# Patient Record
Sex: FEMALE | Race: WHITE | NOT HISPANIC OR LATINO | Employment: OTHER | URBAN - METROPOLITAN AREA
[De-identification: names, ages, dates, MRNs, and addresses within clinical notes are randomized per-mention and may not be internally consistent; named-entity substitution may affect disease eponyms.]

---

## 2017-04-12 PROCEDURE — 88305 TISSUE EXAM BY PATHOLOGIST: CPT | Performed by: OTOLARYNGOLOGY

## 2017-04-13 ENCOUNTER — LAB REQUISITION (OUTPATIENT)
Dept: LAB | Facility: HOSPITAL | Age: 82
End: 2017-04-13
Payer: MEDICARE

## 2017-04-13 DIAGNOSIS — C44.319 BASAL CELL CARCINOMA OF SKIN OF OTHER PARTS OF FACE: ICD-10-CM

## 2017-04-19 PROCEDURE — 88305 TISSUE EXAM BY PATHOLOGIST: CPT | Performed by: OTOLARYNGOLOGY

## 2017-04-22 ENCOUNTER — LAB REQUISITION (OUTPATIENT)
Dept: LAB | Facility: HOSPITAL | Age: 82
End: 2017-04-22
Payer: MEDICARE

## 2017-04-22 DIAGNOSIS — C44.319 BASAL CELL CARCINOMA OF SKIN OF OTHER PARTS OF FACE: ICD-10-CM

## 2017-08-29 LAB
LEFT EYE DIABETIC RETINOPATHY: NORMAL
RIGHT EYE DIABETIC RETINOPATHY: NORMAL

## 2018-07-11 DIAGNOSIS — E87.6 HYPOKALEMIA: Primary | ICD-10-CM

## 2018-07-12 RX ORDER — POTASSIUM CHLORIDE 750 MG/1
10 CAPSULE, EXTENDED RELEASE ORAL 2 TIMES DAILY
Qty: 90 CAPSULE | Refills: 2 | Status: SHIPPED | OUTPATIENT
Start: 2018-07-12 | End: 2018-07-24 | Stop reason: SDUPTHER

## 2018-07-24 DIAGNOSIS — I10 ESSENTIAL HYPERTENSION: Primary | ICD-10-CM

## 2018-07-24 RX ORDER — POTASSIUM CHLORIDE 750 MG/1
TABLET, FILM COATED, EXTENDED RELEASE ORAL
Qty: 90 TABLET | Refills: 3 | Status: SHIPPED | OUTPATIENT
Start: 2018-07-24 | End: 2019-09-27 | Stop reason: SDUPTHER

## 2018-08-02 PROBLEM — E11.9 DIABETES MELLITUS (HCC): Status: ACTIVE | Noted: 2018-08-02

## 2018-08-02 PROBLEM — I10 HYPERTENSION: Status: ACTIVE | Noted: 2018-08-02

## 2018-08-02 RX ORDER — PANTOPRAZOLE SODIUM 40 MG/1
40 TABLET, DELAYED RELEASE ORAL DAILY
COMMUNITY
End: 2018-10-08 | Stop reason: SDUPTHER

## 2018-08-02 RX ORDER — INSULIN GLARGINE 100 [IU]/ML
60 INJECTION, SOLUTION SUBCUTANEOUS
COMMUNITY
End: 2018-09-06 | Stop reason: ALTCHOICE

## 2018-08-02 RX ORDER — AMILORIDE HYDROCHLORIDE 5 MG/1
5 TABLET ORAL DAILY
COMMUNITY
End: 2019-03-26 | Stop reason: SDUPTHER

## 2018-08-02 RX ORDER — LEVOTHYROXINE SODIUM 0.1 MG/1
100 TABLET ORAL DAILY
COMMUNITY
End: 2018-10-08 | Stop reason: SDUPTHER

## 2018-08-02 RX ORDER — PRAVASTATIN SODIUM 40 MG
40 TABLET ORAL DAILY
COMMUNITY
End: 2018-08-14 | Stop reason: SDUPTHER

## 2018-08-02 RX ORDER — IRBESARTAN 150 MG/1
150 TABLET ORAL DAILY
COMMUNITY
End: 2018-12-18 | Stop reason: SDUPTHER

## 2018-08-02 RX ORDER — AMLODIPINE BESYLATE 10 MG/1
10 TABLET ORAL
COMMUNITY
End: 2019-03-26 | Stop reason: SDUPTHER

## 2018-08-14 DIAGNOSIS — E78.5 HYPERLIPIDEMIA, UNSPECIFIED HYPERLIPIDEMIA TYPE: Primary | ICD-10-CM

## 2018-08-14 RX ORDER — PRAVASTATIN SODIUM 40 MG
40 TABLET ORAL DAILY
Qty: 90 TABLET | Refills: 3 | Status: SHIPPED | OUTPATIENT
Start: 2018-08-14 | End: 2019-09-19 | Stop reason: SDUPTHER

## 2018-08-31 ENCOUNTER — APPOINTMENT (OUTPATIENT)
Dept: LAB | Facility: HOSPITAL | Age: 83
End: 2018-08-31
Attending: INTERNAL MEDICINE
Payer: MEDICARE

## 2018-08-31 ENCOUNTER — TRANSCRIBE ORDERS (OUTPATIENT)
Dept: ADMINISTRATIVE | Facility: HOSPITAL | Age: 83
End: 2018-08-31

## 2018-08-31 DIAGNOSIS — I10 ESSENTIAL HYPERTENSION: ICD-10-CM

## 2018-08-31 DIAGNOSIS — IMO0001 IDDM (INSULIN DEPENDENT DIABETES MELLITUS): ICD-10-CM

## 2018-08-31 DIAGNOSIS — IMO0001 IDDM (INSULIN DEPENDENT DIABETES MELLITUS): Primary | ICD-10-CM

## 2018-08-31 DIAGNOSIS — M19.90 OSTEOARTHRITIS, UNSPECIFIED OSTEOARTHRITIS TYPE, UNSPECIFIED SITE: ICD-10-CM

## 2018-08-31 LAB
ALBUMIN SERPL BCP-MCNC: 4.3 G/DL (ref 3.5–5.7)
ANION GAP SERPL CALCULATED.3IONS-SCNC: 12 MMOL/L (ref 4–13)
BUN SERPL-MCNC: 17 MG/DL (ref 7–25)
CALCIUM ALBUM COR SERPL-MCNC: 10.2 MG/DL (ref 8.3–10.1)
CALCIUM SERPL-MCNC: 10.4 MG/DL (ref 8.3–10.1)
CALCIUM SERPL-MCNC: 10.4 MG/DL (ref 8.6–10.5)
CHLORIDE SERPL-SCNC: 98 MMOL/L (ref 98–107)
CO2 SERPL-SCNC: 24 MMOL/L (ref 21–31)
CREAT SERPL-MCNC: 0.91 MG/DL (ref 0.6–1.2)
EST. AVERAGE GLUCOSE BLD GHB EST-MCNC: 286 MG/DL
GFR SERPL CREATININE-BSD FRML MDRD: 56 ML/MIN/1.73SQ M
GLUCOSE P FAST SERPL-MCNC: 224 MG/DL (ref 65–99)
HBA1C MFR BLD: 11.6 % (ref 4.2–6.3)
POTASSIUM SERPL-SCNC: 4.8 MMOL/L (ref 3.5–5.5)
SODIUM SERPL-SCNC: 134 MMOL/L (ref 134–143)

## 2018-08-31 PROCEDURE — 36415 COLL VENOUS BLD VENIPUNCTURE: CPT

## 2018-08-31 PROCEDURE — 80048 BASIC METABOLIC PNL TOTAL CA: CPT

## 2018-08-31 PROCEDURE — 83036 HEMOGLOBIN GLYCOSYLATED A1C: CPT

## 2018-08-31 PROCEDURE — 82040 ASSAY OF SERUM ALBUMIN: CPT

## 2018-09-06 ENCOUNTER — OFFICE VISIT (OUTPATIENT)
Dept: FAMILY MEDICINE CLINIC | Facility: CLINIC | Age: 83
End: 2018-09-06
Payer: MEDICARE

## 2018-09-06 VITALS
DIASTOLIC BLOOD PRESSURE: 58 MMHG | OXYGEN SATURATION: 96 % | HEIGHT: 64 IN | BODY MASS INDEX: 29.53 KG/M2 | SYSTOLIC BLOOD PRESSURE: 114 MMHG | RESPIRATION RATE: 16 BRPM | WEIGHT: 173 LBS | HEART RATE: 84 BPM | TEMPERATURE: 98.7 F

## 2018-09-06 DIAGNOSIS — C44.319 BASAL CELL CARCINOMA (BCC) OF SKIN OF OTHER PART OF FACE: ICD-10-CM

## 2018-09-06 DIAGNOSIS — E78.49 OTHER HYPERLIPIDEMIA: ICD-10-CM

## 2018-09-06 DIAGNOSIS — E10.9 TYPE 1 DIABETES MELLITUS WITHOUT COMPLICATION (HCC): Primary | ICD-10-CM

## 2018-09-06 DIAGNOSIS — I10 ESSENTIAL HYPERTENSION: ICD-10-CM

## 2018-09-06 PROCEDURE — 99214 OFFICE O/P EST MOD 30 MIN: CPT | Performed by: INTERNAL MEDICINE

## 2018-09-06 NOTE — PROGRESS NOTES
Assessment/Plan:  1  Type 2 diabetes on long-term insulin  Currently on Lantus 30 units twice a day  Her blood sugar control is poor with rising A1c to 11 6  I discussed different options with the patient advised her to have a endocrinologist consult but patient has no way to get to [place]  The meantime I decided to change her insulin from Lantus to NovoLog 70 30 starting with 30 units twice daily  Prescription was sent to [facility]  2   Hypertension treated on current meds  3   Hyperlipidemia on statin therapy  Laboratory data fasting sugar 224 BUN 17 creatinine 0 9 A1c 11 6  Plan change Lantus to NovoLog 097504 units twice daily  Monitor blood sugar at least twice a day  Monitor A1c and Chem 7 for a follow-up visit in 3 months  Diagnoses and all orders for this visit:    Type 2 diabetes  -     glucose blood test strip; Test daily  -     insulin aspart protamine-insulin aspart (NOVOLOG MIX 70/30 FLEXPEN) 100 Units/mL injection pen; Inject 30 Units under the skin 2 (two) times a day before meals  -     CBC and differential; Future  -     Basic metabolic panel; Future  -     Hemoglobin A1C; Future    Essential hypertension    Other hyperlipidemia    Basal cell carcinoma (BCC) of skin of other part of face          Subjective:      Patient ID: Fallon Tran is a 80 y o  female  This is an 31-year-old female with longstanding history of type 2 diabetes on basal insulin Lantus 30 units twice daily for last several years  Patient blood sugar does not seem to be well controlled her last A1c has gone up to 11 6 fasting sugar was 224  Patient claims that she is watching her diet she has not gained any weight and she has cut down on her starches  History of longstanding hypertension and hyperlipidemia on statin therapy, hypothyroidism on replacement  Remote history of bleeding gastric ulcer healed chronic anemia resolved    Surgical history significant for right total knee replacement  History of basal cell carcinoma of the right side of the cheek status post excision  Patient noted recurrence of the cancer locally and is scheduled for further surgery  The following portions of the patient's history were reviewed and updated as appropriate: She  has a past medical history of Anemia; Basal cell carcinoma of skin of face; Bleeding ulcer; Hypercholesterolemia; Hypertension; Hypothyroidism; and Insulin dependent diabetes mellitus ([facility] )  Patient Active Problem List    Diagnosis Date Noted    Other hyperlipidemia 09/06/2018    Insulin dependent diabetes mellitus ([facility] )     Hypothyroidism     Hypercholesterolemia     Basal cell carcinoma of skin of face     [facility] 08/02/2018    Hypertension 08/02/2018     She  has a past surgical history that includes Sinus surgery; Replacement total knee (Right); Tonsillectomy; [place] tooth extraction; and Cataract extraction (Bilateral)  Her family history includes Lung cancer in her sister  She  reports that she has never smoked  She has never used smokeless tobacco  She reports that she does not drink alcohol or use drugs    Current Outpatient Prescriptions   Medication Sig Dispense Refill    AMILoride 5 mg tablet Take 5 mg by mouth daily      amLODIPine (NORVASC) 10 mg tablet Take 10 mg by mouth      irbesartan (AVAPRO) 150 mg tablet Take 150 mg by mouth daily      levothyroxine 100 mcg tablet Take 100 mcg by mouth daily      pantoprazole (PROTONIX) 40 mg tablet Take 40 mg by mouth daily      potassium chloride (K-DUR) 10 mEq tablet TAKE 1 TABLET DAILY 90 tablet 3    pravastatin (PRAVACHOL) 40 mg tablet Take 1 tablet (40 mg total) by mouth daily 90 tablet 3    glucose blood test strip Test daily 100 each 3    insulin aspart protamine-insulin aspart (NOVOLOG MIX 70/30 FLEXPEN) 100 Units/mL injection pen Inject 30 Units under the skin 2 (two) times a day before meals 5 pen 3     No current facility-administered medications for this visit  Current Outpatient Prescriptions on File Prior to Visit   Medication Sig    AMILoride 5 mg tablet Take 5 mg by mouth daily    amLODIPine (NORVASC) 10 mg tablet Take 10 mg by mouth    irbesartan (AVAPRO) 150 mg tablet Take 150 mg by mouth daily    levothyroxine 100 mcg tablet Take 100 mcg by mouth daily    pantoprazole (PROTONIX) 40 mg tablet Take 40 mg by mouth daily    potassium chloride (K-DUR) 10 mEq tablet TAKE 1 TABLET DAILY    pravastatin (PRAVACHOL) 40 mg tablet Take 1 tablet (40 mg total) by mouth daily    [DISCONTINUED] insulin glargine (LANTUS) 100 units/mL subcutaneous injection Inject 60 Units under the skin daily at bedtime     No current facility-administered medications on file prior to visit  She is allergic to iv dye [iodinated diagnostic agents]; percocet [oxycodone-acetaminophen]; phenobarbital; and statins       Review of Systems   Constitutional: Positive for fatigue  HENT: Negative  Eyes: Negative  Respiratory: Negative  Cardiovascular: Negative  Gastrointestinal: Negative  Endocrine: Negative  Genitourinary: Negative  Musculoskeletal: Positive for gait problem, joint swelling and myalgias  Skin: Negative  Allergic/Immunologic: Negative  Hematological: Negative  Psychiatric/Behavioral: Negative  Objective:      /58   Pulse 84   Temp 98 7 °F (37 1 °C) (Tympanic)   Resp 16   Ht 5' 4" (1 626 m)   Wt 78 5 kg (173 lb)   SpO2 96%   BMI 29 70 kg/m²          Physical Exam   Constitutional: She is oriented to person, place, and time  She appears well-developed and well-nourished  HENT:   Head: Normocephalic and atraumatic  Eyes: Pupils are equal, round, and reactive to light  Neck: Normal range of motion  Neck supple  No JVD present  No thyromegaly present  Cardiovascular: Normal rate and regular rhythm  Exam reveals no friction rub      Murmur (Brief systolic murmur noted) heard   Pulmonary/Chest: Effort normal and breath sounds normal  No respiratory distress  She has no wheezes  She has no rales  Abdominal: Soft  Bowel sounds are normal  She exhibits no mass  There is no tenderness  There is no rebound  Musculoskeletal: Normal range of motion  She exhibits no edema or tenderness  Lymphadenopathy:     She has no cervical adenopathy  Neurological: She is alert and oriented to person, place, and time  She has normal reflexes  Skin: Skin is warm and dry  Psychiatric: She has a normal mood and affect  Appointment on 08/31/2018   Component Date Value Ref Range Status    Hemoglobin A1C 08/31/2018 11 6* 4 2 - 6 3 % Final    EAG 08/31/2018 286  mg/dl Final    Sodium 08/31/2018 134  134 - 143 mmol/L Final    Potassium 08/31/2018 4 8  3 5 - 5 5 mmol/L Final    Chloride 08/31/2018 98  98 - 107 mmol/L Final    CO2 08/31/2018 24  21 - 31 mmol/L Final    ANION GAP 08/31/2018 12  4 - 13 mmol/L Final    BUN 08/31/2018 17  7 - 25 mg/dL Final    Creatinine 08/31/2018 0 91  0 60 - 1 20 mg/dL Final    Standardized to IDMS reference method    Glucose, Fasting 08/31/2018 224* 65 - 99 mg/dL Final      Specimen collection should occur prior to Sulfasalazine administration due to the potential for falsely depressed results  Specimen collection should occur prior to Sulfapyridine administration due to the potential for falsely elevated results   Calcium 08/31/2018 10 4  8 6 - 10 5 mg/dL Final    eGFR 08/31/2018 56  ml/min/1 73sq m Final    Albumin 08/31/2018 4 3  3 5 - 5 7 g/dL Final    Corrected Calcium 08/31/2018 10 2* 8 3 - 10 1 mg/dL Final    CALCIUM FOR CA/ALB 08/31/2018 10 4* 8 3 - 10 1 mg/dL Final       No results found

## 2018-10-08 DIAGNOSIS — E03.9 HYPOTHYROIDISM, UNSPECIFIED TYPE: Primary | ICD-10-CM

## 2018-10-08 DIAGNOSIS — K21.9 GASTROESOPHAGEAL REFLUX DISEASE WITHOUT ESOPHAGITIS: ICD-10-CM

## 2018-10-08 RX ORDER — PANTOPRAZOLE SODIUM 40 MG/1
40 TABLET, DELAYED RELEASE ORAL DAILY
Qty: 90 TABLET | Refills: 2 | Status: SHIPPED | OUTPATIENT
Start: 2018-10-08 | End: 2019-08-02 | Stop reason: SDUPTHER

## 2018-10-08 RX ORDER — LEVOTHYROXINE SODIUM 0.1 MG/1
100 TABLET ORAL DAILY
Qty: 90 TABLET | Refills: 2 | Status: SHIPPED | OUTPATIENT
Start: 2018-10-08 | End: 2019-07-29 | Stop reason: SDUPTHER

## 2018-10-23 ENCOUNTER — IMMUNIZATION (OUTPATIENT)
Dept: FAMILY MEDICINE CLINIC | Facility: CLINIC | Age: 83
End: 2018-10-23
Payer: MEDICARE

## 2018-10-23 DIAGNOSIS — Z23 NEEDS FLU SHOT: Primary | ICD-10-CM

## 2018-10-23 PROCEDURE — G0008 ADMIN INFLUENZA VIRUS VAC: HCPCS

## 2018-10-23 PROCEDURE — 90662 IIV NO PRSV INCREASED AG IM: CPT

## 2018-11-20 ENCOUNTER — TRANSCRIBE ORDERS (OUTPATIENT)
Dept: ADMINISTRATIVE | Facility: HOSPITAL | Age: 83
End: 2018-11-20

## 2018-11-20 ENCOUNTER — APPOINTMENT (OUTPATIENT)
Dept: LAB | Facility: HOSPITAL | Age: 83
End: 2018-11-20
Attending: INTERNAL MEDICINE
Payer: MEDICARE

## 2018-11-20 DIAGNOSIS — E10.9 TYPE 1 DIABETES MELLITUS WITHOUT COMPLICATION (HCC): ICD-10-CM

## 2018-11-20 LAB
ALBUMIN SERPL BCP-MCNC: 4.3 G/DL (ref 3.5–5.7)
ANION GAP SERPL CALCULATED.3IONS-SCNC: 9 MMOL/L (ref 4–13)
BASOPHILS # BLD AUTO: 0 THOUSANDS/ΜL (ref 0–0.1)
BASOPHILS NFR BLD AUTO: 0 % (ref 0–1)
BUN SERPL-MCNC: 16 MG/DL (ref 7–25)
CALCIUM ALBUM COR SERPL-MCNC: 10.3 MG/DL (ref 8.3–10.1)
CALCIUM SERPL-MCNC: 10.5 MG/DL (ref 8.3–10.1)
CALCIUM SERPL-MCNC: 10.5 MG/DL (ref 8.6–10.5)
CHLORIDE SERPL-SCNC: 101 MMOL/L (ref 98–107)
CO2 SERPL-SCNC: 27 MMOL/L (ref 21–31)
CREAT SERPL-MCNC: 0.83 MG/DL (ref 0.6–1.2)
EOSINOPHIL # BLD AUTO: 0.1 THOUSAND/ΜL (ref 0–0.61)
EOSINOPHIL NFR BLD AUTO: 2 % (ref 0–6)
ERYTHROCYTE [DISTWIDTH] IN BLOOD BY AUTOMATED COUNT: 15.4 % (ref 11.6–15.1)
GFR SERPL CREATININE-BSD FRML MDRD: 63 ML/MIN/1.73SQ M
GLUCOSE P FAST SERPL-MCNC: 113 MG/DL (ref 65–99)
HCT VFR BLD AUTO: 42.6 % (ref 37–47)
HGB BLD-MCNC: 14 G/DL (ref 11.5–15.4)
LYMPHOCYTES # BLD AUTO: 1.9 THOUSANDS/ΜL (ref 0.6–4.47)
LYMPHOCYTES NFR BLD AUTO: 25 % (ref 14–44)
MCH RBC QN AUTO: 28.4 PG (ref 26.8–34.3)
MCHC RBC AUTO-ENTMCNC: 32.8 G/DL (ref 31.4–37.4)
MCV RBC AUTO: 87 FL (ref 82–98)
MONOCYTES # BLD AUTO: 0.6 THOUSAND/ΜL (ref 0.17–1.22)
MONOCYTES NFR BLD AUTO: 8 % (ref 4–12)
NEUTROPHILS # BLD AUTO: 4.9 THOUSANDS/ΜL (ref 1.85–7.62)
NEUTS SEG NFR BLD AUTO: 65 % (ref 43–75)
NRBC BLD AUTO-RTO: 0 /100 WBCS
PLATELET # BLD AUTO: 211 THOUSANDS/UL (ref 149–390)
PMV BLD AUTO: 9 FL (ref 8.9–12.7)
POTASSIUM SERPL-SCNC: 4.8 MMOL/L (ref 3.5–5.5)
RBC # BLD AUTO: 4.93 MILLION/UL (ref 3.81–5.12)
SODIUM SERPL-SCNC: 137 MMOL/L (ref 134–143)
WBC # BLD AUTO: 7.6 THOUSAND/UL (ref 4.31–10.16)

## 2018-11-20 PROCEDURE — 85025 COMPLETE CBC W/AUTO DIFF WBC: CPT

## 2018-11-20 PROCEDURE — 36415 COLL VENOUS BLD VENIPUNCTURE: CPT

## 2018-11-20 PROCEDURE — 83036 HEMOGLOBIN GLYCOSYLATED A1C: CPT

## 2018-11-20 PROCEDURE — 80048 BASIC METABOLIC PNL TOTAL CA: CPT

## 2018-11-20 PROCEDURE — 82040 ASSAY OF SERUM ALBUMIN: CPT

## 2018-11-21 LAB
EST. AVERAGE GLUCOSE BLD GHB EST-MCNC: 237 MG/DL
HBA1C MFR BLD: 9.9 % (ref 4.2–6.3)

## 2018-11-28 ENCOUNTER — OFFICE VISIT (OUTPATIENT)
Dept: FAMILY MEDICINE CLINIC | Facility: CLINIC | Age: 83
End: 2018-11-28
Payer: MEDICARE

## 2018-11-28 VITALS
SYSTOLIC BLOOD PRESSURE: 130 MMHG | BODY MASS INDEX: 29.19 KG/M2 | HEART RATE: 89 BPM | TEMPERATURE: 99.4 F | HEIGHT: 64 IN | OXYGEN SATURATION: 97 % | WEIGHT: 171 LBS | DIASTOLIC BLOOD PRESSURE: 80 MMHG | RESPIRATION RATE: 16 BRPM

## 2018-11-28 DIAGNOSIS — C44.319 BASAL CELL CARCINOMA (BCC) OF SKIN OF OTHER PART OF FACE: ICD-10-CM

## 2018-11-28 DIAGNOSIS — E03.9 HYPOTHYROIDISM, UNSPECIFIED TYPE: ICD-10-CM

## 2018-11-28 DIAGNOSIS — IMO0001 INSULIN DEPENDENT DIABETES MELLITUS: Primary | ICD-10-CM

## 2018-11-28 DIAGNOSIS — I10 ESSENTIAL HYPERTENSION: ICD-10-CM

## 2018-11-28 DIAGNOSIS — E78.49 OTHER HYPERLIPIDEMIA: ICD-10-CM

## 2018-11-28 PROCEDURE — 99214 OFFICE O/P EST MOD 30 MIN: CPT | Performed by: INTERNAL MEDICINE

## 2018-11-28 NOTE — PROGRESS NOTES
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 patient ambulates with the help of a cane  Lab data from 11/20/2018 was reviewed fasting sugar was 113 kidney function is normal CBC was normal A1c is down to 9 9  Follow-up scheduled in 3 months with recheck of A1c and Chem profile  A foot exam was done documented         There are no diagnoses linked to this encounter  Subjective:      Patient ID: Meryle Oram is a 80 y o  female  57-year-old female with longstanding history of type 2 insulin-dependent diabetes  Patient was recently changed from basal insulin of Lantus to NovoLog 7030   Patient is currently on 30 units of NovoLog 70 30 twice a day  Her fasting sugar has clearly improved and come down around 110-130  A1c is down from 11 6 to 9 9  Patient had no episodes of hypoglycemia with this insulin  Other medical issues include longstanding history of hypertension hyperlipidemia on statin therapy  History of hypothyroidism on replacement  Patient recently had extensive resection of a basal cell carcinoma of her right cheek  N/C and is now well-healed  Patient has no canoe complaints at this time denies any chest pain dyspnea or palpitation no GI or bowel complaints no leg edema          The following portions of the patient's history were reviewed and updated as appropriate: She  has a past medical history of Anemia; Basal cell carcinoma of skin of face; Bleeding ulcer; Hypercholesterolemia; Hypertension; Hypothyroidism; and Insulin dependent diabetes mellitus ([facility] )  Patient Active Problem List    Diagnosis Date Noted    Other hyperlipidemia 09/06/2018    Insulin dependent diabetes mellitus ([facility] )     Hypothyroidism     Hypercholesterolemia     Basal cell carcinoma of skin of face     Diabetes mellitus ([place] [address] ) 08/02/2018    Hypertension 08/02/2018     She  has a past surgical history that includes Sinus surgery; Replacement total knee (Right); Tonsillectomy; [place] tooth extraction; Cataract extraction (Bilateral); and Mohs surgery  Her family history includes Lung cancer in her sister  She  reports that she has never smoked  She has never used smokeless tobacco  She reports that she does not drink alcohol or use drugs  Current Outpatient Prescriptions   Medication Sig Dispense Refill    AMILoride 5 mg tablet Take 5 mg by mouth daily      amLODIPine (NORVASC) 10 mg tablet Take 10 mg by mouth      glucose blood test strip Test daily 100 each 3    insulin aspart protamine-insulin aspart (NOVOLOG MIX 70/30 FLEXPEN) 100 Units/mL injection pen Inject 30 Units under the skin 2 (two) times a day before meals 5 pen 3    irbesartan (AVAPRO) 150 mg tablet Take 150 mg by mouth daily      levothyroxine 100 mcg tablet Take 1 tablet (100 mcg total) by mouth daily 90 tablet 2    pantoprazole (PROTONIX) 40 mg tablet Take 1 tablet (40 mg total) by mouth daily 90 tablet 2    potassium chloride (K-DUR) 10 mEq tablet TAKE 1 TABLET DAILY 90 tablet 3    pravastatin (PRAVACHOL) 40 mg tablet Take 1 tablet (40 mg total) by mouth daily 90 tablet 3     No current facility-administered medications for this visit        Current Outpatient Prescriptions on File Prior to Visit   Medication Sig    AMILoride 5 mg tablet Take 5 mg by mouth daily    amLODIPine (NORVASC) 10 mg tablet Take 10 mg by mouth    glucose blood test strip Test daily    insulin aspart protamine-insulin aspart (NOVOLOG MIX 70/30 FLEXPEN) 100 Units/mL injection pen Inject 30 Units under the skin 2 (two) times a day before meals    irbesartan (AVAPRO) 150 mg tablet Take 150 mg by mouth daily    levothyroxine 100 mcg tablet Take 1 tablet (100 mcg total) by mouth daily    pantoprazole (PROTONIX) 40 mg tablet Take 1 tablet (40 mg total) by mouth daily    potassium chloride (K-DUR) 10 mEq tablet TAKE 1 TABLET DAILY    pravastatin (PRAVACHOL) 40 mg tablet Take 1 tablet (40 mg total) by mouth daily     No current facility-administered medications on file prior to visit  She is allergic to iv dye [iodinated diagnostic agents]; percocet [oxycodone-acetaminophen]; phenobarbital; and statins       Review of Systems   Constitutional: Positive for fatigue  HENT: Negative  Eyes: Negative  Respiratory: Negative  Cardiovascular: Negative  Gastrointestinal: Negative  Endocrine: Negative  Genitourinary: Negative  Musculoskeletal: Positive for arthralgias  Skin: Negative  Allergic/Immunologic: Negative  Neurological: Negative  Hematological: Negative  Psychiatric/Behavioral: Negative  Objective:      /80   Pulse 89   Temp 99 4 °F (37 4 °C) (Tympanic)   Resp 16   Ht 5' 4" (1 626 m)   Wt 77 6 kg (171 lb)   SpO2 97%   BMI 29 35 kg/m²          Physical Exam   Constitutional: She is oriented to person, place, and time  She appears well-developed and well-nourished  HENT:   Head: Normocephalic and atraumatic  Eyes: Pupils are equal, round, and reactive to light  Neck: Normal range of motion  Neck supple  No JVD present  No thyromegaly present  Cardiovascular: Normal rate and regular rhythm  Exam reveals no friction rub  Murmur heard  Pulses:       Dorsalis pedis pulses are 1+ on the right side, and 1+ on the left side  Posterior tibial pulses are 1+ on the right side, and 1+ on the left side     A brief systolic ejection murmur is noted   Pulmonary/Chest: Effort normal and breath sounds normal  No respiratory distress  She has no wheezes  She has no rales  Abdominal: Soft  Bowel sounds are normal  She exhibits no mass  There is no tenderness  There is no rebound  Musculoskeletal: Normal range of motion  She exhibits no edema or tenderness  Feet:    Feet:   Right Foot:   Skin Integrity: Negative for ulcer, skin breakdown, erythema, warmth, callus or dry skin  Left Foot:   Skin Integrity: Negative for ulcer, skin breakdown, erythema, warmth, callus or dry skin  Lymphadenopathy:     She has no cervical adenopathy  Neurological: She is alert and oriented to person, place, and time  She has normal reflexes  Skin: Skin is warm and dry  Psychiatric: She has a normal mood and affect           Appointment on 11/20/2018   Component Date Value Ref Range Status    WBC 11/20/2018 7 60  4 31 - 10 16 Thousand/uL Final    RBC 11/20/2018 4 93  3 81 - 5 12 Million/uL Final    Hemoglobin 11/20/2018 14 0  11 5 - 15 4 g/dL Final    Hematocrit 11/20/2018 42 6  37 0 - 47 0 % Final    MCV 11/20/2018 87  82 - 98 fL Final    MCH 11/20/2018 28 4  26 8 - 34 3 pg Final    MCHC 11/20/2018 32 8  31 4 - 37 4 g/dL Final    RDW 11/20/2018 15 4* 11 6 - 15 1 % Final    MPV 11/20/2018 9 0  8 9 - 12 7 fL Final    Platelets 98/81/5384 211  149 - 390 Thousands/uL Final    nRBC 11/20/2018 0  /100 WBCs Final    Neutrophils Relative 11/20/2018 65  43 - 75 % Final    Lymphocytes Relative 11/20/2018 25  14 - 44 % Final    Monocytes Relative 11/20/2018 8  4 - 12 % Final    Eosinophils Relative 11/20/2018 2  0 - 6 % Final    Basophils Relative 11/20/2018 0  0 - 1 % Final    Neutrophils Absolute 11/20/2018 4 90  1 85 - 7 62 Thousands/µL Final    Lymphocytes Absolute 11/20/2018 1 90  0 60 - 4 47 Thousands/µL Final    Monocytes Absolute 11/20/2018 0 60  0 17 - 1 22 Thousand/µL Final    Eosinophils Absolute 11/20/2018 0 10  0 00 - 0 61 Thousand/µL Final    Basophils Absolute 11/20/2018 0 00 0 00 - 0 10 Thousands/µL Final    Sodium 11/20/2018 137  134 - 143 mmol/L Final    Potassium 11/20/2018 4 8  3 5 - 5 5 mmol/L Final    Chloride 11/20/2018 101  98 - 107 mmol/L Final    CO2 11/20/2018 27  21 - 31 mmol/L Final    ANION GAP 11/20/2018 9  4 - 13 mmol/L Final    BUN 11/20/2018 16  7 - 25 mg/dL Final    Creatinine 11/20/2018 0 83  0 60 - 1 20 mg/dL Final    Standardized to IDMS reference method    Glucose, Fasting 11/20/2018 113* 65 - 99 mg/dL Final      Specimen collection should occur prior to Sulfasalazine administration due to the potential for falsely depressed results  Specimen collection should occur prior to Sulfapyridine administration due to the potential for falsely elevated results   Calcium 11/20/2018 10 5  8 6 - 10 5 mg/dL Final    eGFR 11/20/2018 63  ml/min/1 73sq m Final    Hemoglobin A1C 11/20/2018 9 9* 4 2 - 6 3 % Final    EAG 11/20/2018 237  mg/dl Final    Albumin 11/20/2018 4 3  3 5 - 5 7 g/dL Final    Corrected Calcium 11/20/2018 10 3* 8 3 - 10 1 mg/dL Final    CALCIUM FOR CA/ALB 11/20/2018 10 5* 8 3 - 10 1 mg/dL Final   Appointment on 08/31/2018   Component Date Value Ref Range Status    Hemoglobin A1C 08/31/2018 11 6* 4 2 - 6 3 % Final    EAG 08/31/2018 286  mg/dl Final    Sodium 08/31/2018 134  134 - 143 mmol/L Final    Potassium 08/31/2018 4 8  3 5 - 5 5 mmol/L Final    Chloride 08/31/2018 98  98 - 107 mmol/L Final    CO2 08/31/2018 24  21 - 31 mmol/L Final    ANION GAP 08/31/2018 12  4 - 13 mmol/L Final    BUN 08/31/2018 17  7 - 25 mg/dL Final    Creatinine 08/31/2018 0 91  0 60 - 1 20 mg/dL Final    Standardized to IDMS reference method    Glucose, Fasting 08/31/2018 224* 65 - 99 mg/dL Final      Specimen collection should occur prior to Sulfasalazine administration due to the potential for falsely depressed results  Specimen collection should occur prior to Sulfapyridine administration due to the potential for falsely elevated results      Calcium 08/31/2018 10 4  8 6 - 10 5 mg/dL Final    eGFR 08/31/2018 56  ml/min/1 73sq m Final    Albumin 08/31/2018 4 3  3 5 - 5 7 g/dL Final    Corrected Calcium 08/31/2018 10 2* 8 3 - 10 1 mg/dL Final    CALCIUM FOR CA/ALB 08/31/2018 10 4* 8 3 - 10 1 mg/dL Final       No results found  Patient's shoes and socks removed  Right Foot/Ankle   Right Foot Inspection  Skin Exam: skin normal and skin intact no dry skin, no warmth, no callus, no erythema, no maceration, no abnormal color, no pre-ulcer, no ulcer and no callus                          Toe Exam: ROM and strength within normal limits  Sensory   Vibration: diminished  Proprioception: diminished   Monofilament testing: diminished  Vascular  Capillary refills: < 3 seconds  The right DP pulse is 1+  The right PT pulse is 1+  Right Toe  - Comprehensive Exam  Ecchymosis: none  Arch: normal  Hammertoes: first toe  Claw Toes: absent  Swelling: none   Tenderness: none         Left Foot/Ankle  Left Foot Inspection  Skin Exam: skin normal and skin intactno dry skin, no warmth, no erythema, no maceration, normal color, no pre-ulcer, no ulcer and no callus                         Toe Exam: ROM and strength within normal limits                   Sensory   Vibration: diminished  Proprioception: diminished  Monofilament: diminished  Vascular  Capillary refills: < 3 seconds  The left DP pulse is 1+  The left PT pulse is 1+     Left Toe  - Comprehensive Exam  Ecchymosis: none  Arch: normal  Hammertoes: first toe  Claw toes: absent  Swelling: none   Tenderness: none       Assign Risk Category:  No deformity present; ;

## 2018-12-18 DIAGNOSIS — Z79.4 TYPE 2 DIABETES MELLITUS WITH COMPLICATION, WITH LONG-TERM CURRENT USE OF INSULIN (HCC): Primary | ICD-10-CM

## 2018-12-18 DIAGNOSIS — E11.8 TYPE 2 DIABETES MELLITUS WITH COMPLICATION, WITH LONG-TERM CURRENT USE OF INSULIN (HCC): Primary | ICD-10-CM

## 2018-12-18 DIAGNOSIS — Z76.0 MEDICATION REFILL: Primary | ICD-10-CM

## 2018-12-18 RX ORDER — IRBESARTAN 150 MG/1
150 TABLET ORAL DAILY
Qty: 90 TABLET | Refills: 2 | Status: SHIPPED | OUTPATIENT
Start: 2018-12-18 | End: 2019-03-27 | Stop reason: SDUPTHER

## 2019-02-27 DIAGNOSIS — I10 HYPERTENSION, UNSPECIFIED TYPE: Primary | ICD-10-CM

## 2019-02-27 DIAGNOSIS — R53.83 FATIGUE, UNSPECIFIED TYPE: ICD-10-CM

## 2019-02-27 DIAGNOSIS — Z00.00 ANNUAL PHYSICAL EXAM: ICD-10-CM

## 2019-02-27 DIAGNOSIS — E03.9 HYPOTHYROIDISM, UNSPECIFIED TYPE: ICD-10-CM

## 2019-02-27 DIAGNOSIS — E11.8 TYPE 2 DIABETES MELLITUS WITH COMPLICATION, UNSPECIFIED WHETHER LONG TERM INSULIN USE: ICD-10-CM

## 2019-03-26 DIAGNOSIS — Z76.0 MEDICATION REFILL: Primary | ICD-10-CM

## 2019-03-26 RX ORDER — AMILORIDE HYDROCHLORIDE 5 MG/1
5 TABLET ORAL DAILY
Qty: 90 TABLET | Refills: 2 | Status: SHIPPED | OUTPATIENT
Start: 2019-03-26 | End: 2019-04-03

## 2019-03-26 RX ORDER — AMLODIPINE BESYLATE 10 MG/1
10 TABLET ORAL DAILY
Qty: 90 TABLET | Refills: 2 | Status: SHIPPED | OUTPATIENT
Start: 2019-03-26 | End: 2020-03-23 | Stop reason: SDUPTHER

## 2019-03-27 DIAGNOSIS — Z76.0 MEDICATION REFILL: ICD-10-CM

## 2019-03-27 NOTE — TELEPHONE ENCOUNTER
Patient states this is on back order at her mail order until 4/19  Asking if a month can be sent to [facility]

## 2019-03-28 RX ORDER — IRBESARTAN 150 MG/1
150 TABLET ORAL DAILY
Qty: 30 TABLET | Refills: 0 | Status: SHIPPED | OUTPATIENT
Start: 2019-03-28 | End: 2019-05-14 | Stop reason: SDUPTHER

## 2019-03-29 ENCOUNTER — APPOINTMENT (OUTPATIENT)
Dept: LAB | Facility: HOSPITAL | Age: 84
End: 2019-03-29
Attending: INTERNAL MEDICINE
Payer: MEDICARE

## 2019-03-29 ENCOUNTER — TRANSCRIBE ORDERS (OUTPATIENT)
Dept: ADMINISTRATIVE | Facility: HOSPITAL | Age: 84
End: 2019-03-29

## 2019-03-29 DIAGNOSIS — E11.8 TYPE 2 DIABETES MELLITUS WITH COMPLICATION, WITH LONG-TERM CURRENT USE OF INSULIN (HCC): ICD-10-CM

## 2019-03-29 DIAGNOSIS — E11.8 TYPE 2 DIABETES MELLITUS WITH COMPLICATION, UNSPECIFIED WHETHER LONG TERM INSULIN USE: ICD-10-CM

## 2019-03-29 DIAGNOSIS — Z79.4 TYPE 2 DIABETES MELLITUS WITH COMPLICATION, WITH LONG-TERM CURRENT USE OF INSULIN (HCC): ICD-10-CM

## 2019-03-29 DIAGNOSIS — I10 HYPERTENSION, UNSPECIFIED TYPE: ICD-10-CM

## 2019-03-29 DIAGNOSIS — Z00.00 ANNUAL PHYSICAL EXAM: ICD-10-CM

## 2019-03-29 DIAGNOSIS — R53.83 FATIGUE, UNSPECIFIED TYPE: ICD-10-CM

## 2019-03-29 DIAGNOSIS — I10 HYPERTENSION, ESSENTIAL: Primary | ICD-10-CM

## 2019-03-29 DIAGNOSIS — E03.9 HYPOTHYROIDISM, UNSPECIFIED TYPE: ICD-10-CM

## 2019-03-29 LAB
ALBUMIN SERPL BCP-MCNC: 4 G/DL (ref 3.5–5.7)
ALP SERPL-CCNC: 73 U/L (ref 55–165)
ALT SERPL W P-5'-P-CCNC: 13 U/L (ref 7–52)
ANION GAP SERPL CALCULATED.3IONS-SCNC: 10 MMOL/L (ref 4–13)
AST SERPL W P-5'-P-CCNC: 18 U/L (ref 13–39)
BACTERIA UR QL AUTO: ABNORMAL /HPF
BASOPHILS # BLD AUTO: 0 THOUSANDS/ΜL (ref 0–0.1)
BASOPHILS NFR BLD AUTO: 1 % (ref 0–1)
BILIRUB SERPL-MCNC: 0.6 MG/DL (ref 0.2–1)
BILIRUB UR QL STRIP: NEGATIVE
BUN SERPL-MCNC: 20 MG/DL (ref 7–25)
CALCIUM SERPL-MCNC: 10 MG/DL (ref 8.6–10.5)
CHLORIDE SERPL-SCNC: 98 MMOL/L (ref 98–107)
CHOLEST SERPL-MCNC: 143 MG/DL (ref 0–200)
CLARITY UR: CLEAR
CO2 SERPL-SCNC: 27 MMOL/L (ref 21–31)
COLOR UR: YELLOW
CREAT SERPL-MCNC: 0.87 MG/DL (ref 0.6–1.2)
CREAT UR-MCNC: 104 MG/DL
EOSINOPHIL # BLD AUTO: 0.1 THOUSAND/ΜL (ref 0–0.61)
EOSINOPHIL NFR BLD AUTO: 2 % (ref 0–6)
ERYTHROCYTE [DISTWIDTH] IN BLOOD BY AUTOMATED COUNT: 15.6 % (ref 11.6–15.1)
EST. AVERAGE GLUCOSE BLD GHB EST-MCNC: 252 MG/DL
GFR SERPL CREATININE-BSD FRML MDRD: 59 ML/MIN/1.73SQ M
GLUCOSE P FAST SERPL-MCNC: 235 MG/DL (ref 65–99)
GLUCOSE UR STRIP-MCNC: NEGATIVE MG/DL
HBA1C MFR BLD: 10.4 % (ref 4.2–6.3)
HCT VFR BLD AUTO: 41.2 % (ref 37–47)
HDLC SERPL-MCNC: 36 MG/DL (ref 40–60)
HGB BLD-MCNC: 13.6 G/DL (ref 11.5–15.4)
HGB UR QL STRIP.AUTO: NEGATIVE
KETONES UR STRIP-MCNC: NEGATIVE MG/DL
LDLC SERPL CALC-MCNC: 70 MG/DL (ref 0–100)
LEUKOCYTE ESTERASE UR QL STRIP: ABNORMAL
LYMPHOCYTES # BLD AUTO: 1.4 THOUSANDS/ΜL (ref 0.6–4.47)
LYMPHOCYTES NFR BLD AUTO: 24 % (ref 14–44)
MCH RBC QN AUTO: 28.7 PG (ref 26.8–34.3)
MCHC RBC AUTO-ENTMCNC: 32.9 G/DL (ref 31.4–37.4)
MCV RBC AUTO: 87 FL (ref 82–98)
MICROALBUMIN UR-MCNC: 261 MG/L (ref 0–20)
MICROALBUMIN/CREAT 24H UR: 251 MG/G CREATININE (ref 0–30)
MONOCYTES # BLD AUTO: 0.5 THOUSAND/ΜL (ref 0.17–1.22)
MONOCYTES NFR BLD AUTO: 9 % (ref 4–12)
MUCOUS THREADS UR QL AUTO: ABNORMAL
NEUTROPHILS # BLD AUTO: 3.9 THOUSANDS/ΜL (ref 1.85–7.62)
NEUTS SEG NFR BLD AUTO: 65 % (ref 43–75)
NITRITE UR QL STRIP: NEGATIVE
NON-SQ EPI CELLS URNS QL MICRO: ABNORMAL /HPF
PH UR STRIP.AUTO: 5.5 [PH]
PLATELET # BLD AUTO: 218 THOUSANDS/UL (ref 149–390)
PMV BLD AUTO: 10 FL (ref 8.9–12.7)
POTASSIUM SERPL-SCNC: 4.7 MMOL/L (ref 3.5–5.5)
PROT SERPL-MCNC: 7.7 G/DL (ref 6.4–8.9)
PROT UR STRIP-MCNC: ABNORMAL MG/DL
RBC # BLD AUTO: 4.73 MILLION/UL (ref 3.81–5.12)
RBC #/AREA URNS AUTO: ABNORMAL /HPF
SODIUM SERPL-SCNC: 135 MMOL/L (ref 134–143)
SP GR UR STRIP.AUTO: 1.02 (ref 1–1.03)
T4 FREE SERPL-MCNC: 1.19 NG/DL (ref 0.76–1.46)
TRIGL SERPL-MCNC: 185 MG/DL (ref 44–166)
TSH SERPL DL<=0.05 MIU/L-ACNC: 6.56 UIU/ML (ref 0.45–5.33)
UROBILINOGEN UR QL STRIP.AUTO: 0.2 E.U./DL
WBC # BLD AUTO: 6 THOUSAND/UL (ref 4.31–10.16)
WBC #/AREA URNS AUTO: ABNORMAL /HPF

## 2019-03-29 PROCEDURE — 36415 COLL VENOUS BLD VENIPUNCTURE: CPT

## 2019-03-29 PROCEDURE — 87186 SC STD MICRODIL/AGAR DIL: CPT

## 2019-03-29 PROCEDURE — 80053 COMPREHEN METABOLIC PANEL: CPT

## 2019-03-29 PROCEDURE — 81001 URINALYSIS AUTO W/SCOPE: CPT

## 2019-03-29 PROCEDURE — 83036 HEMOGLOBIN GLYCOSYLATED A1C: CPT

## 2019-03-29 PROCEDURE — 80061 LIPID PANEL: CPT

## 2019-03-29 PROCEDURE — 82570 ASSAY OF URINE CREATININE: CPT

## 2019-03-29 PROCEDURE — 82043 UR ALBUMIN QUANTITATIVE: CPT

## 2019-03-29 PROCEDURE — 85025 COMPLETE CBC W/AUTO DIFF WBC: CPT

## 2019-03-29 PROCEDURE — 84439 ASSAY OF FREE THYROXINE: CPT

## 2019-03-29 PROCEDURE — 87086 URINE CULTURE/COLONY COUNT: CPT

## 2019-03-29 PROCEDURE — 84443 ASSAY THYROID STIM HORMONE: CPT

## 2019-03-29 PROCEDURE — 87077 CULTURE AEROBIC IDENTIFY: CPT

## 2019-03-31 LAB — BACTERIA UR CULT: ABNORMAL

## 2019-04-01 DIAGNOSIS — N30.00 ACUTE CYSTITIS WITHOUT HEMATURIA: Primary | ICD-10-CM

## 2019-04-01 RX ORDER — CIPROFLOXACIN 250 MG/1
250 TABLET, FILM COATED ORAL EVERY 12 HOURS SCHEDULED
Qty: 10 TABLET | Refills: 1 | Status: SHIPPED | OUTPATIENT
Start: 2019-04-01 | End: 2019-04-06

## 2019-04-03 ENCOUNTER — OFFICE VISIT (OUTPATIENT)
Dept: FAMILY MEDICINE CLINIC | Facility: CLINIC | Age: 84
End: 2019-04-03
Payer: MEDICARE

## 2019-04-03 VITALS
DIASTOLIC BLOOD PRESSURE: 80 MMHG | HEIGHT: 64 IN | TEMPERATURE: 98.8 F | SYSTOLIC BLOOD PRESSURE: 128 MMHG | RESPIRATION RATE: 16 BRPM | HEART RATE: 78 BPM | WEIGHT: 174 LBS | BODY MASS INDEX: 29.71 KG/M2 | OXYGEN SATURATION: 96 %

## 2019-04-03 DIAGNOSIS — E11.9 ENCOUNTER FOR DIABETIC FOOT EXAM (HCC): ICD-10-CM

## 2019-04-03 DIAGNOSIS — Z01.00 DIABETIC EYE EXAM (HCC): Primary | ICD-10-CM

## 2019-04-03 DIAGNOSIS — E11.9 DIABETIC EYE EXAM (HCC): Primary | ICD-10-CM

## 2019-04-03 DIAGNOSIS — IMO0001 INSULIN DEPENDENT DIABETES MELLITUS: ICD-10-CM

## 2019-04-03 DIAGNOSIS — Z00.01 ENCOUNTER FOR ANNUAL GENERAL MEDICAL EXAMINATION WITH ABNORMAL FINDINGS IN ADULT: ICD-10-CM

## 2019-04-03 DIAGNOSIS — I10 ESSENTIAL HYPERTENSION: ICD-10-CM

## 2019-04-03 DIAGNOSIS — Z23 ENCOUNTER FOR ADMINISTRATION OF VACCINE: ICD-10-CM

## 2019-04-03 DIAGNOSIS — E03.9 HYPOTHYROIDISM, UNSPECIFIED TYPE: ICD-10-CM

## 2019-04-03 DIAGNOSIS — I10 ESSENTIAL HYPERTENSION: Primary | ICD-10-CM

## 2019-04-03 PROCEDURE — 99214 OFFICE O/P EST MOD 30 MIN: CPT | Performed by: INTERNAL MEDICINE

## 2019-04-03 PROCEDURE — G0009 ADMIN PNEUMOCOCCAL VACCINE: HCPCS

## 2019-04-03 PROCEDURE — 90670 PCV13 VACCINE IM: CPT

## 2019-04-03 PROCEDURE — G0439 PPPS, SUBSEQ VISIT: HCPCS | Performed by: INTERNAL MEDICINE

## 2019-04-03 RX ORDER — GLIPIZIDE AND METFORMIN HCL 5; 500 MG/1; MG/1
1.5 TABLET, FILM COATED ORAL
COMMUNITY
End: 2019-06-18

## 2019-04-03 RX ORDER — AMILORIDE HYDROCHLORIDE AND HYDROCHLOROTHIAZIDE 5; 50 MG/1; MG/1
1 TABLET ORAL DAILY
Qty: 90 TABLET | Refills: 3 | Status: SHIPPED | OUTPATIENT
Start: 2019-04-03 | End: 2020-03-23 | Stop reason: SDUPTHER

## 2019-04-08 ENCOUNTER — TELEPHONE (OUTPATIENT)
Dept: FAMILY MEDICINE CLINIC | Facility: CLINIC | Age: 84
End: 2019-04-08

## 2019-05-14 DIAGNOSIS — Z76.0 MEDICATION REFILL: ICD-10-CM

## 2019-05-15 RX ORDER — IRBESARTAN 150 MG/1
150 TABLET ORAL DAILY
Qty: 90 TABLET | Refills: 1 | Status: SHIPPED | OUTPATIENT
Start: 2019-05-15 | End: 2019-11-15 | Stop reason: SDUPTHER

## 2019-06-14 ENCOUNTER — APPOINTMENT (OUTPATIENT)
Dept: LAB | Facility: HOSPITAL | Age: 84
End: 2019-06-14
Attending: INTERNAL MEDICINE
Payer: MEDICARE

## 2019-06-14 ENCOUNTER — TRANSCRIBE ORDERS (OUTPATIENT)
Dept: ADMINISTRATIVE | Facility: HOSPITAL | Age: 84
End: 2019-06-14

## 2019-06-14 DIAGNOSIS — E11.9 ENCOUNTER FOR DIABETIC FOOT EXAM (HCC): ICD-10-CM

## 2019-06-14 LAB
ALBUMIN SERPL BCP-MCNC: 4.3 G/DL (ref 3.5–5.7)
ANION GAP SERPL CALCULATED.3IONS-SCNC: 8 MMOL/L (ref 4–13)
BUN SERPL-MCNC: 20 MG/DL (ref 7–25)
CALCIUM ALBUM COR SERPL-MCNC: 10.5 MG/DL (ref 8.3–10.1)
CALCIUM SERPL-MCNC: 10.7 MG/DL (ref 8.3–10.1)
CALCIUM SERPL-MCNC: 10.7 MG/DL (ref 8.6–10.5)
CHLORIDE SERPL-SCNC: 105 MMOL/L (ref 98–107)
CO2 SERPL-SCNC: 26 MMOL/L (ref 21–31)
CREAT SERPL-MCNC: 1.04 MG/DL (ref 0.6–1.2)
EST. AVERAGE GLUCOSE BLD GHB EST-MCNC: 194 MG/DL
GFR SERPL CREATININE-BSD FRML MDRD: 47 ML/MIN/1.73SQ M
GLUCOSE P FAST SERPL-MCNC: 65 MG/DL (ref 65–99)
HBA1C MFR BLD: 8.4 % (ref 4.2–6.3)
POTASSIUM SERPL-SCNC: 4.5 MMOL/L (ref 3.5–5.5)
SODIUM SERPL-SCNC: 139 MMOL/L (ref 134–143)

## 2019-06-14 PROCEDURE — 80048 BASIC METABOLIC PNL TOTAL CA: CPT

## 2019-06-14 PROCEDURE — 36415 COLL VENOUS BLD VENIPUNCTURE: CPT

## 2019-06-14 PROCEDURE — 83036 HEMOGLOBIN GLYCOSYLATED A1C: CPT

## 2019-06-14 PROCEDURE — 82040 ASSAY OF SERUM ALBUMIN: CPT

## 2019-06-18 ENCOUNTER — OFFICE VISIT (OUTPATIENT)
Dept: FAMILY MEDICINE CLINIC | Facility: CLINIC | Age: 84
End: 2019-06-18
Payer: MEDICARE

## 2019-06-18 VITALS
SYSTOLIC BLOOD PRESSURE: 120 MMHG | BODY MASS INDEX: 29.09 KG/M2 | TEMPERATURE: 100 F | HEART RATE: 92 BPM | DIASTOLIC BLOOD PRESSURE: 58 MMHG | WEIGHT: 170.4 LBS | OXYGEN SATURATION: 98 % | HEIGHT: 64 IN | RESPIRATION RATE: 18 BRPM

## 2019-06-18 DIAGNOSIS — IMO0001 INSULIN DEPENDENT DIABETES MELLITUS: ICD-10-CM

## 2019-06-18 DIAGNOSIS — I10 ESSENTIAL HYPERTENSION: ICD-10-CM

## 2019-06-18 DIAGNOSIS — E03.9 HYPOTHYROIDISM, UNSPECIFIED TYPE: ICD-10-CM

## 2019-06-18 DIAGNOSIS — Z01.00 DIABETIC EYE EXAM (HCC): Primary | ICD-10-CM

## 2019-06-18 DIAGNOSIS — E11.9 DIABETIC EYE EXAM (HCC): Primary | ICD-10-CM

## 2019-06-18 PROBLEM — E11.42 DIABETIC POLYNEUROPATHY ASSOCIATED WITH TYPE 2 DIABETES MELLITUS (HCC): Status: ACTIVE | Noted: 2019-06-18

## 2019-06-18 PROCEDURE — 99214 OFFICE O/P EST MOD 30 MIN: CPT | Performed by: INTERNAL MEDICINE

## 2019-06-18 RX ORDER — GLYBURIDE-METFORMIN HYDROCHLORIDE 5; 500 MG/1; MG/1
TABLET ORAL
Qty: 270 TABLET | Refills: 1 | Status: SHIPPED | OUTPATIENT
Start: 2019-06-18 | End: 2019-06-24 | Stop reason: CLARIF

## 2019-06-24 DIAGNOSIS — Z76.0 MEDICATION REFILL: Primary | ICD-10-CM

## 2019-06-24 RX ORDER — GLYBURIDE 5 MG/1
5 TABLET ORAL 2 TIMES DAILY WITH MEALS
Qty: 180 TABLET | Refills: 1 | Status: SHIPPED | OUTPATIENT
Start: 2019-06-24 | End: 2020-02-12

## 2019-06-25 ENCOUNTER — TELEPHONE (OUTPATIENT)
Dept: FAMILY MEDICINE CLINIC | Facility: CLINIC | Age: 84
End: 2019-06-25

## 2019-07-09 ENCOUNTER — TELEPHONE (OUTPATIENT)
Dept: FAMILY MEDICINE CLINIC | Facility: CLINIC | Age: 84
End: 2019-07-09

## 2019-07-09 NOTE — TELEPHONE ENCOUNTER
Patient called into the office stating she never received her Glyburide which she stated she was told was approved on 6/28/2019  Now patient got a letter stating she received another type of medication that can not be taken together   Please advise if patients medicaion was approved

## 2019-07-10 ENCOUNTER — TELEPHONE (OUTPATIENT)
Dept: FAMILY MEDICINE CLINIC | Facility: CLINIC | Age: 84
End: 2019-07-10

## 2019-07-10 NOTE — TELEPHONE ENCOUNTER
Patient called the office on 7/10/19 regarding her combination medication Glyburide/Metformin 5/500mg stating she had not received it yet from [employer]  Contacted [facility] at #[phone] spoke with the pharmacist named Oscar Domingo who verified the prescription was supposed to be mailed out  He verified it will be mailed out ASAP and the patient should be receiving within 7 days  Patient notified

## 2019-07-11 NOTE — TELEPHONE ENCOUNTER
Contacted [facility] and spoke with pharmacist  Pharmacy error corrected and was informed the prescription was being mailed out asa

## 2019-07-29 DIAGNOSIS — I10 ESSENTIAL HYPERTENSION: ICD-10-CM

## 2019-07-29 DIAGNOSIS — E03.9 HYPOTHYROIDISM, UNSPECIFIED TYPE: ICD-10-CM

## 2019-07-30 RX ORDER — LEVOTHYROXINE SODIUM 0.1 MG/1
100 TABLET ORAL DAILY
Qty: 90 TABLET | Refills: 2 | Status: SHIPPED | OUTPATIENT
Start: 2019-07-30 | End: 2020-03-23 | Stop reason: SDUPTHER

## 2019-08-02 DIAGNOSIS — K21.9 GASTROESOPHAGEAL REFLUX DISEASE WITHOUT ESOPHAGITIS: ICD-10-CM

## 2019-08-02 RX ORDER — PANTOPRAZOLE SODIUM 40 MG/1
40 TABLET, DELAYED RELEASE ORAL DAILY
Qty: 90 TABLET | Refills: 1 | Status: SHIPPED | OUTPATIENT
Start: 2019-08-02 | End: 2020-03-23 | Stop reason: SDUPTHER

## 2019-09-18 ENCOUNTER — APPOINTMENT (OUTPATIENT)
Dept: LAB | Facility: HOSPITAL | Age: 84
End: 2019-09-18
Attending: INTERNAL MEDICINE
Payer: MEDICARE

## 2019-09-18 DIAGNOSIS — IMO0001 INSULIN DEPENDENT DIABETES MELLITUS: ICD-10-CM

## 2019-09-18 LAB
EST. AVERAGE GLUCOSE BLD GHB EST-MCNC: 192 MG/DL
HBA1C MFR BLD: 8.3 % (ref 4.2–6.3)

## 2019-09-18 PROCEDURE — 83036 HEMOGLOBIN GLYCOSYLATED A1C: CPT

## 2019-09-18 PROCEDURE — 36415 COLL VENOUS BLD VENIPUNCTURE: CPT

## 2019-09-19 DIAGNOSIS — E78.5 HYPERLIPIDEMIA, UNSPECIFIED HYPERLIPIDEMIA TYPE: ICD-10-CM

## 2019-09-19 RX ORDER — PRAVASTATIN SODIUM 40 MG
40 TABLET ORAL DAILY
Qty: 90 TABLET | Refills: 2 | Status: SHIPPED | OUTPATIENT
Start: 2019-09-19 | End: 2020-03-23 | Stop reason: SDUPTHER

## 2019-09-20 ENCOUNTER — OFFICE VISIT (OUTPATIENT)
Dept: FAMILY MEDICINE CLINIC | Facility: CLINIC | Age: 84
End: 2019-09-20
Payer: MEDICARE

## 2019-09-20 VITALS
OXYGEN SATURATION: 98 % | SYSTOLIC BLOOD PRESSURE: 138 MMHG | TEMPERATURE: 99.2 F | BODY MASS INDEX: 29.71 KG/M2 | WEIGHT: 174 LBS | RESPIRATION RATE: 18 BRPM | HEART RATE: 93 BPM | HEIGHT: 64 IN | DIASTOLIC BLOOD PRESSURE: 64 MMHG

## 2019-09-20 DIAGNOSIS — Z01.00 DIABETIC EYE EXAM (HCC): ICD-10-CM

## 2019-09-20 DIAGNOSIS — E66.3 OVERWEIGHT (BMI 25.0-29.9): ICD-10-CM

## 2019-09-20 DIAGNOSIS — Z23 NEEDS FLU SHOT: ICD-10-CM

## 2019-09-20 DIAGNOSIS — Z76.0 MEDICATION REFILL: ICD-10-CM

## 2019-09-20 DIAGNOSIS — Z79.4 TYPE 2 DIABETES MELLITUS WITH DIABETIC POLYNEUROPATHY, WITH LONG-TERM CURRENT USE OF INSULIN (HCC): Primary | ICD-10-CM

## 2019-09-20 DIAGNOSIS — E11.9 DIABETIC EYE EXAM (HCC): ICD-10-CM

## 2019-09-20 DIAGNOSIS — E11.42 TYPE 2 DIABETES MELLITUS WITH DIABETIC POLYNEUROPATHY, WITH LONG-TERM CURRENT USE OF INSULIN (HCC): Primary | ICD-10-CM

## 2019-09-20 PROCEDURE — 90662 IIV NO PRSV INCREASED AG IM: CPT

## 2019-09-20 PROCEDURE — G0008 ADMIN INFLUENZA VIRUS VAC: HCPCS

## 2019-09-20 PROCEDURE — 99214 OFFICE O/P EST MOD 30 MIN: CPT | Performed by: NURSE PRACTITIONER

## 2019-09-20 NOTE — PATIENT INSTRUCTIONS
Return with medication bottles to discuss your specific doses  Reviewed labs today  Return in 3 months for medcheck/HgA1c  Strictly avoid sugar/simple carbohydrates  Call or return for problems/concerns

## 2019-09-20 NOTE — PROGRESS NOTES
[facility]        NAME: Jason Post is a 80 y o  female  : 1929    MRN: [id number]  DATE: 2019  TIME: 1:52 PM    Assessment and Plan   Type 2 diabetes mellitus with diabetic polyneuropathy, with long-term current use of insulin (HCC) [E11 42, Z79 4]  1  Type 2 diabetes mellitus with diabetic polyneuropathy, with long-term current use of insulin ([address] )     2  Diabetic eye exam [facility])  Ambulatory Referral to Ophthalmology   3  Needs flu shot  influenza vaccine, [id number], high-dose, PF 0 5 mL (FLUZONE HIGH-DOSE)   4  Medication refill     5  Overweight (BMI 25 0-29  9)           Patient Instructions     Patient Instructions   Return with medication bottles to discuss your specific doses  Reviewed labs today  Return in 3 months for medcheck/HgA1c  Strictly avoid sugar/simple carbohydrates  Call or return for problems/concerns          Chief Complaint     Chief Complaint   Patient presents with    Follow-up    Diabetes         History of Present Illness       Here for 3 month medcheck- c/o chronic knee pain- had replacement by Dr Curry Ambrosio 15 years ago  Unsure what medications and combinations she is taking- is willing to bring her bottles to the office to discuss        Review of Systems   Review of Systems   Constitutional: Negative for activity change, diaphoresis, fatigue and fever  HENT: Negative for congestion, facial swelling, hearing loss, rhinorrhea, sinus pressure, sinus pain, sneezing, sore throat and voice change  Eyes: Negative for discharge and visual disturbance  Respiratory: Negative for cough, choking, chest tightness, shortness of breath, wheezing and stridor  Cardiovascular: Negative for chest pain, palpitations and leg swelling  Gastrointestinal: Negative for abdominal distention, abdominal pain, constipation, diarrhea, nausea and vomiting  Endocrine: Negative for polydipsia, polyphagia and polyuria     Genitourinary: Negative for difficulty urinating, dysuria, frequency and urgency  Musculoskeletal: Positive for arthralgias (chronic knee pain)  Negative for back pain, gait problem, joint swelling, myalgias, neck pain and neck stiffness  Skin: Negative for color change, rash and wound  Neurological: Negative for dizziness, syncope, speech difficulty, weakness, light-headedness and headaches  Hematological: Negative for adenopathy  Does not bruise/bleed easily  Psychiatric/Behavioral: Negative for agitation, behavioral problems, confusion, hallucinations, sleep disturbance and suicidal ideas  The patient is not nervous/anxious            Current Medications       Current Outpatient Medications:     AMILoride-hydrochlorothiazide (MODURETIC) 5-50 mg per tablet, Take 1 tablet by mouth daily, Disp: 90 tablet, Rfl: 3    amLODIPine (NORVASC) 10 mg tablet, Take 1 tablet (10 mg total) by mouth daily, Disp: 90 tablet, Rfl: 2    glucose blood test strip, Test daily, Disp: 100 each, Rfl: 3    glyBURIDE (DIABETA) 5 mg tablet, Take 1 tablet (5 mg total) by mouth 2 (two) times a day with meals, Disp: 180 tablet, Rfl: 1    insulin aspart protamine-insulin aspart (NOVOLOG MIX 70/30 FLEXPEN) 100 Units/mL injection pen, Inject 30 Units under the skin 2 (two) times a day before meals, Disp: 5 pen, Rfl: 3    irbesartan (AVAPRO) 150 mg tablet, Take 1 tablet (150 mg total) by mouth daily, Disp: 90 tablet, Rfl: 1    levothyroxine 100 mcg tablet, Take 1 tablet (100 mcg total) by mouth daily, Disp: 90 tablet, Rfl: 2    metFORMIN (GLUCOPHAGE) 500 mg tablet, Take 1 tablet (500 mg total) by mouth 2 (two) times a day with meals, Disp: 180 tablet, Rfl: 1    pantoprazole (PROTONIX) 40 mg tablet, Take 1 tablet (40 mg total) by mouth daily, Disp: 90 tablet, Rfl: 1    potassium chloride (K-DUR) 10 mEq tablet, TAKE 1 TABLET DAILY, Disp: 90 tablet, Rfl: 3    pravastatin (PRAVACHOL) 40 mg tablet, Take 1 tablet (40 mg total) by mouth daily, Disp: 90 tablet, Rfl: 2    Current Allergies     Allergies as of 09/20/2019 - Reviewed 09/20/2019   Allergen Reaction Noted    Iv dye [iodinated diagnostic agents]  08/02/2018    Percocet [oxycodone-acetaminophen]  08/02/2018    Phenobarbital  08/02/2018    Statins  08/02/2018            The following portions of the patient's history were reviewed and updated as appropriate: allergies, current medications, past family history, past medical history, past social history, past surgical history and problem list      Past Medical History:   Diagnosis Date    Anemia     Basal cell carcinoma of skin of face     Bleeding ulcer     Hypercholesterolemia     Hypertension     Hypothyroidism     Insulin dependent diabetes mellitus ([address] )        Past Surgical History:   Procedure Laterality Date    CATARACT EXTRACTION Bilateral     MOHS SURGERY      REPLACEMENT TOTAL KNEE Right     SINUS SURGERY      TONSILLECTOMY      WISDOM TOOTH EXTRACTION         Family History   Problem Relation Age of Onset    Lung cancer Sister          Medications have been verified  Objective   /64   Pulse 93   Temp 99 2 °F (37 3 °C) (Tympanic)   Resp 18   Ht 5' 4" (1 626 m)   Wt 78 9 kg (174 lb)   SpO2 98%   BMI 29 87 kg/m²        Physical Exam     Physical Exam   Constitutional: She is oriented to person, place, and time  Vital signs are normal  She appears well-developed and well-nourished  She is active and cooperative  No distress  Eyes: EOM are normal    Neck: Normal range of motion  Neck supple  No tracheal deviation present  No thyromegaly present  Cardiovascular: Normal rate, regular rhythm and normal heart sounds  No murmur heard  Pulmonary/Chest: Effort normal and breath sounds normal  No respiratory distress  She has no wheezes  Musculoskeletal: Normal range of motion  She exhibits no edema or deformity  Neurological: She is alert and oriented to person, place, and time  Skin: Skin is warm and dry  No rash noted  She is not diaphoretic  No erythema  Psychiatric: She has a normal mood and affect  Her behavior is normal  Judgment and thought content normal    Nursing note and vitals reviewed  BMI Counseling: Body mass index is 29 87 kg/m²  The BMI is above normal  Nutrition recommendations include moderation in carbohydrate intake and increasing intake of lean protein

## 2019-09-27 DIAGNOSIS — I10 ESSENTIAL HYPERTENSION: ICD-10-CM

## 2019-09-27 RX ORDER — POTASSIUM CHLORIDE 750 MG/1
10 TABLET, FILM COATED, EXTENDED RELEASE ORAL DAILY
Qty: 90 TABLET | Refills: 3 | Status: SHIPPED | OUTPATIENT
Start: 2019-09-27 | End: 2020-03-23 | Stop reason: SDUPTHER

## 2019-10-24 DIAGNOSIS — E10.9 TYPE 1 DIABETES MELLITUS WITHOUT COMPLICATION (HCC): ICD-10-CM

## 2019-10-31 DIAGNOSIS — E10.9 TYPE 1 DIABETES MELLITUS WITHOUT COMPLICATION (HCC): ICD-10-CM

## 2019-11-01 DIAGNOSIS — E10.9 TYPE 1 DIABETES MELLITUS WITHOUT COMPLICATION (HCC): ICD-10-CM

## 2019-11-04 ENCOUNTER — TELEPHONE (OUTPATIENT)
Dept: FAMILY MEDICINE CLINIC | Facility: CLINIC | Age: 84
End: 2019-11-04

## 2019-11-04 DIAGNOSIS — E10.9 TYPE 1 DIABETES MELLITUS WITHOUT COMPLICATION (HCC): ICD-10-CM

## 2019-11-15 DIAGNOSIS — Z76.0 MEDICATION REFILL: ICD-10-CM

## 2019-11-15 RX ORDER — IRBESARTAN 150 MG/1
150 TABLET ORAL DAILY
Qty: 90 TABLET | Refills: 1 | Status: SHIPPED | OUTPATIENT
Start: 2019-11-15 | End: 2020-03-23 | Stop reason: SDUPTHER

## 2020-01-27 ENCOUNTER — TELEPHONE (OUTPATIENT)
Dept: FAMILY MEDICINE CLINIC | Facility: CLINIC | Age: 85
End: 2020-01-27

## 2020-01-27 DIAGNOSIS — I10 ESSENTIAL HYPERTENSION: ICD-10-CM

## 2020-01-27 DIAGNOSIS — E10.9 TYPE 1 DIABETES MELLITUS WITHOUT COMPLICATION (HCC): Primary | ICD-10-CM

## 2020-01-27 DIAGNOSIS — E78.5 HYPERLIPIDEMIA, UNSPECIFIED HYPERLIPIDEMIA TYPE: ICD-10-CM

## 2020-01-27 DIAGNOSIS — IMO0001 INSULIN DEPENDENT DIABETES MELLITUS: ICD-10-CM

## 2020-01-27 DIAGNOSIS — R53.83 FATIGUE, UNSPECIFIED TYPE: ICD-10-CM

## 2020-01-27 DIAGNOSIS — E03.9 HYPOTHYROIDISM, UNSPECIFIED TYPE: ICD-10-CM

## 2020-01-27 NOTE — TELEPHONE ENCOUNTER
Can you add in addition to what you put in: Lipids, Microalbumin urine (she has Microalbuminuria), and Ddimer for shortness of breath

## 2020-01-27 NOTE — TELEPHONE ENCOUNTER
Patient's daughter called the office requesting an appointment this week and that we place lab orders to be drawn prior to appointment  Patient's daughter reported she is in town and her mother seems off  A little more forgetful and is getting short of breath when walking  It appears like she is taking her pills but they are unsure when she had last A1C and other labs  I did inform her the dates of the last bloodwork      I told her we would place orders for bloodwork to be drawn on 1/28/20 and she will have her mother fast

## 2020-01-28 ENCOUNTER — APPOINTMENT (OUTPATIENT)
Dept: LAB | Facility: CLINIC | Age: 85
End: 2020-01-28
Payer: MEDICARE

## 2020-01-28 DIAGNOSIS — I10 ESSENTIAL HYPERTENSION: ICD-10-CM

## 2020-01-28 DIAGNOSIS — R80.9 MICROALBUMINURIA: ICD-10-CM

## 2020-01-28 DIAGNOSIS — IMO0001 INSULIN DEPENDENT DIABETES MELLITUS: ICD-10-CM

## 2020-01-28 DIAGNOSIS — R06.02 SHORTNESS OF BREATH: ICD-10-CM

## 2020-01-28 DIAGNOSIS — E10.9 TYPE 1 DIABETES MELLITUS WITHOUT COMPLICATION (HCC): ICD-10-CM

## 2020-01-28 DIAGNOSIS — E03.9 HYPOTHYROIDISM, UNSPECIFIED TYPE: ICD-10-CM

## 2020-01-28 DIAGNOSIS — E78.5 HYPERLIPIDEMIA, UNSPECIFIED HYPERLIPIDEMIA TYPE: ICD-10-CM

## 2020-01-28 DIAGNOSIS — R06.02 SHORTNESS OF BREATH: Primary | ICD-10-CM

## 2020-01-28 DIAGNOSIS — R53.83 FATIGUE, UNSPECIFIED TYPE: ICD-10-CM

## 2020-01-28 LAB
ALBUMIN SERPL BCP-MCNC: 3.4 G/DL (ref 3.5–5)
ALP SERPL-CCNC: 51 U/L (ref 46–116)
ALT SERPL W P-5'-P-CCNC: 21 U/L (ref 12–78)
ANION GAP SERPL CALCULATED.3IONS-SCNC: 5 MMOL/L (ref 4–13)
AST SERPL W P-5'-P-CCNC: 19 U/L (ref 5–45)
BACTERIA UR QL AUTO: ABNORMAL /HPF
BASOPHILS # BLD AUTO: 0.02 THOUSANDS/ΜL (ref 0–0.1)
BASOPHILS NFR BLD AUTO: 0 % (ref 0–1)
BILIRUB SERPL-MCNC: 0.67 MG/DL (ref 0.2–1)
BILIRUB UR QL STRIP: NEGATIVE
BUN SERPL-MCNC: 15 MG/DL (ref 5–25)
CALCIUM SERPL-MCNC: 9.3 MG/DL (ref 8.3–10.1)
CHLORIDE SERPL-SCNC: 109 MMOL/L (ref 100–108)
CHOLEST SERPL-MCNC: 216 MG/DL (ref 50–200)
CLARITY UR: CLEAR
CO2 SERPL-SCNC: 26 MMOL/L (ref 21–32)
COLOR UR: YELLOW
CREAT SERPL-MCNC: 0.88 MG/DL (ref 0.6–1.3)
CREAT UR-MCNC: 158 MG/DL
D DIMER PPP FEU-MCNC: 0.47 UG/ML FEU
EOSINOPHIL # BLD AUTO: 0.11 THOUSAND/ΜL (ref 0–0.61)
EOSINOPHIL NFR BLD AUTO: 2 % (ref 0–6)
ERYTHROCYTE [DISTWIDTH] IN BLOOD BY AUTOMATED COUNT: 15.2 % (ref 11.6–15.1)
EST. AVERAGE GLUCOSE BLD GHB EST-MCNC: 189 MG/DL
GFR SERPL CREATININE-BSD FRML MDRD: 58 ML/MIN/1.73SQ M
GLUCOSE P FAST SERPL-MCNC: 143 MG/DL (ref 65–99)
GLUCOSE UR STRIP-MCNC: NEGATIVE MG/DL
HBA1C MFR BLD: 8.2 % (ref 4.2–6.3)
HCT VFR BLD AUTO: 45.1 % (ref 34.8–46.1)
HDLC SERPL-MCNC: 32 MG/DL
HGB BLD-MCNC: 14.2 G/DL (ref 11.5–15.4)
HGB UR QL STRIP.AUTO: ABNORMAL
HYALINE CASTS #/AREA URNS LPF: ABNORMAL /LPF
IMM GRANULOCYTES # BLD AUTO: 0.01 THOUSAND/UL (ref 0–0.2)
IMM GRANULOCYTES NFR BLD AUTO: 0 % (ref 0–2)
KETONES UR STRIP-MCNC: NEGATIVE MG/DL
LDLC SERPL CALC-MCNC: 131 MG/DL (ref 0–100)
LEUKOCYTE ESTERASE UR QL STRIP: NEGATIVE
LYMPHOCYTES # BLD AUTO: 1.45 THOUSANDS/ΜL (ref 0.6–4.47)
LYMPHOCYTES NFR BLD AUTO: 23 % (ref 14–44)
MCH RBC QN AUTO: 28.8 PG (ref 26.8–34.3)
MCHC RBC AUTO-ENTMCNC: 31.5 G/DL (ref 31.4–37.4)
MCV RBC AUTO: 92 FL (ref 82–98)
MICROALBUMIN UR-MCNC: 4650 MG/L (ref 0–20)
MICROALBUMIN/CREAT 24H UR: 2943 MG/G CREATININE (ref 0–30)
MONOCYTES # BLD AUTO: 0.53 THOUSAND/ΜL (ref 0.17–1.22)
MONOCYTES NFR BLD AUTO: 8 % (ref 4–12)
NEUTROPHILS # BLD AUTO: 4.2 THOUSANDS/ΜL (ref 1.85–7.62)
NEUTS SEG NFR BLD AUTO: 67 % (ref 43–75)
NITRITE UR QL STRIP: NEGATIVE
NON-SQ EPI CELLS URNS QL MICRO: ABNORMAL /HPF
NONHDLC SERPL-MCNC: 184 MG/DL
NRBC BLD AUTO-RTO: 0 /100 WBCS
PH UR STRIP.AUTO: 5.5 [PH]
PLATELET # BLD AUTO: 210 THOUSANDS/UL (ref 149–390)
PMV BLD AUTO: 11.1 FL (ref 8.9–12.7)
POTASSIUM SERPL-SCNC: 3.9 MMOL/L (ref 3.5–5.3)
PROT SERPL-MCNC: 7.8 G/DL (ref 6.4–8.2)
PROT UR STRIP-MCNC: ABNORMAL MG/DL
RBC # BLD AUTO: 4.93 MILLION/UL (ref 3.81–5.12)
RBC #/AREA URNS AUTO: ABNORMAL /HPF
SODIUM SERPL-SCNC: 140 MMOL/L (ref 136–145)
SP GR UR STRIP.AUTO: 1.02 (ref 1–1.03)
T4 FREE SERPL-MCNC: 1.06 NG/DL (ref 0.76–1.46)
TRIGL SERPL-MCNC: 263 MG/DL
TSH SERPL DL<=0.05 MIU/L-ACNC: 4.83 UIU/ML (ref 0.36–3.74)
UROBILINOGEN UR QL STRIP.AUTO: 0.2 E.U./DL
WBC # BLD AUTO: 6.32 THOUSAND/UL (ref 4.31–10.16)
WBC #/AREA URNS AUTO: ABNORMAL /HPF

## 2020-01-28 PROCEDURE — 87086 URINE CULTURE/COLONY COUNT: CPT

## 2020-01-28 PROCEDURE — 84439 ASSAY OF FREE THYROXINE: CPT

## 2020-01-28 PROCEDURE — 80053 COMPREHEN METABOLIC PANEL: CPT

## 2020-01-28 PROCEDURE — 84443 ASSAY THYROID STIM HORMONE: CPT

## 2020-01-28 PROCEDURE — 87186 SC STD MICRODIL/AGAR DIL: CPT

## 2020-01-28 PROCEDURE — 82570 ASSAY OF URINE CREATININE: CPT

## 2020-01-28 PROCEDURE — 82043 UR ALBUMIN QUANTITATIVE: CPT

## 2020-01-28 PROCEDURE — 85025 COMPLETE CBC W/AUTO DIFF WBC: CPT

## 2020-01-28 PROCEDURE — 87077 CULTURE AEROBIC IDENTIFY: CPT

## 2020-01-28 PROCEDURE — 36415 COLL VENOUS BLD VENIPUNCTURE: CPT

## 2020-01-28 PROCEDURE — 81001 URINALYSIS AUTO W/SCOPE: CPT

## 2020-01-28 PROCEDURE — 80061 LIPID PANEL: CPT

## 2020-01-28 PROCEDURE — 83036 HEMOGLOBIN GLYCOSYLATED A1C: CPT

## 2020-01-28 PROCEDURE — 87147 CULTURE TYPE IMMUNOLOGIC: CPT

## 2020-01-28 PROCEDURE — 87181 SC STD AGAR DILUTION PER AGT: CPT

## 2020-01-28 PROCEDURE — 85379 FIBRIN DEGRADATION QUANT: CPT

## 2020-01-30 DIAGNOSIS — N39.0 URINARY TRACT INFECTION WITHOUT HEMATURIA, SITE UNSPECIFIED: Primary | ICD-10-CM

## 2020-01-30 RX ORDER — AMOXICILLIN AND CLAVULANATE POTASSIUM 875; 125 MG/1; MG/1
1 TABLET, FILM COATED ORAL EVERY 12 HOURS SCHEDULED
Qty: 20 TABLET | Refills: 0 | Status: SHIPPED | OUTPATIENT
Start: 2020-01-30 | End: 2020-02-09

## 2020-01-31 ENCOUNTER — OFFICE VISIT (OUTPATIENT)
Dept: FAMILY MEDICINE CLINIC | Facility: CLINIC | Age: 85
End: 2020-01-31
Payer: MEDICARE

## 2020-01-31 VITALS
BODY MASS INDEX: 30.05 KG/M2 | SYSTOLIC BLOOD PRESSURE: 136 MMHG | HEIGHT: 64 IN | TEMPERATURE: 100.8 F | RESPIRATION RATE: 18 BRPM | HEART RATE: 109 BPM | WEIGHT: 176 LBS | DIASTOLIC BLOOD PRESSURE: 70 MMHG | OXYGEN SATURATION: 98 %

## 2020-01-31 DIAGNOSIS — E11.42 TYPE 2 DIABETES MELLITUS WITH DIABETIC POLYNEUROPATHY, WITH LONG-TERM CURRENT USE OF INSULIN (HCC): Primary | ICD-10-CM

## 2020-01-31 DIAGNOSIS — E03.9 ACQUIRED HYPOTHYROIDISM: ICD-10-CM

## 2020-01-31 DIAGNOSIS — N39.0 URINARY TRACT INFECTION WITHOUT HEMATURIA, SITE UNSPECIFIED: ICD-10-CM

## 2020-01-31 DIAGNOSIS — E66.3 OVERWEIGHT WITH BODY MASS INDEX (BMI) OF 29 TO 29.9 IN ADULT: ICD-10-CM

## 2020-01-31 DIAGNOSIS — R80.9 MICROALBUMINURIA: ICD-10-CM

## 2020-01-31 DIAGNOSIS — Z79.4 TYPE 2 DIABETES MELLITUS WITH DIABETIC POLYNEUROPATHY, WITH LONG-TERM CURRENT USE OF INSULIN (HCC): Primary | ICD-10-CM

## 2020-01-31 LAB
BACTERIA UR CULT: ABNORMAL
BACTERIA UR CULT: ABNORMAL

## 2020-01-31 PROCEDURE — 99214 OFFICE O/P EST MOD 30 MIN: CPT | Performed by: NURSE PRACTITIONER

## 2020-01-31 RX ORDER — GLYBURIDE-METFORMIN HYDROCHLORIDE 1.25; 25 MG/1; MG/1
1 TABLET ORAL
Qty: 180 TABLET | Refills: 0 | Status: CANCELLED | OUTPATIENT
Start: 2020-01-31

## 2020-01-31 NOTE — TELEPHONE ENCOUNTER
Patients daughter called the office after going home and reviewing the patients medications with her  Per daughter the patient is taking Glucovance 5-500 mg 1 and a half tablets twice daily  The medication list has them seperated and only 1 tablet twice daily  She is in need of a refill to [facility] and wants to know how it should be taken  The patient has enough for a week still  Please advise

## 2020-01-31 NOTE — PATIENT INSTRUCTIONS
Continue Augmentin for UT  Discussed Thyroid level- will keep Levothyroxine at 100mcg daily  Increase Insulin to 34 units twice daily- check blood glucose level around 2pm daily- keep a log of these readings- call in 2 weeks with these numbers- may increase insulin depending on results  In 3 months- get repeat blood work and urine sample (CMP, Hga1c, TSH, Microalbumin urine, and Urinalysis)

## 2020-01-31 NOTE — PROGRESS NOTES
[facility]        NAME: Bashir Maddox is a 80 y o  female  : 1929    MRN: [id number]  DATE: 2020  TIME: 2:28 PM    Assessment and Plan   Type 2 diabetes mellitus with diabetic polyneuropathy, with long-term current use of insulin ([facility] [address] ) [E11 42, Z79 4]  1  Type 2 diabetes mellitus with diabetic polyneuropathy, with long-term current use of insulin ([facility])  HEMOGLOBIN A1C W/ EAG ESTIMATION    Comprehensive metabolic panel   2  Microalbuminuria  Microalbumin / creatinine urine ratio   3  Acquired hypothyroidism  TSH, 3rd generation with Free T4 reflex   4  Urinary tract infection without hematuria, site unspecified  UA (URINE) with reflex to Scope    Urine Microscopic   5  Overweight with body mass index (BMI) of 29 to 29 9 in adult       BMI Counseling: Body mass index is 30 21 kg/m²  The BMI is above normal  Nutrition recommendations include decreasing portion sizes, encouraging healthy choices of fruits and vegetables, decreasing fast food intake, consuming healthier snacks, limiting drinks that contain sugar, moderation in carbohydrate intake and increasing intake of lean protein  Patient Instructions     Patient Instructions   Continue Augmentin for UT  Discussed Thyroid level- will keep Levothyroxine at 100mcg daily  Increase Insulin to 34 units twice daily- check blood glucose level around 2pm daily- keep a log of these readings- call in 2 weeks with these numbers- may increase insulin depending on results  In 3 months- get repeat blood work and urine sample (CMP, Hga1c, TSH, Microalbumin urine, and Urinalysis)          Chief Complaint     Chief Complaint   Patient presents with    Follow-up     UTI/Lab work done         History of Present Illness       Here with daughter Tameka Velasquez to discuss recent symptoms and review labs/urine  Reviewed all labs and urine- daughter and patient decline increasing Levothyroxine dose or referring to Nephrology   Will increase insulin and recheck labs in 3 months- see wrap up for specifics    Last weekend patient was "not acting herself" per daughter  She was just staring, confused, and unsteady  Has improved since hydrating and starting Augmentin for UTI  Review of Systems   Review of Systems   Constitutional: Positive for activity change and fatigue  Negative for diaphoresis and fever  HENT: Negative for congestion, facial swelling, hearing loss, rhinorrhea, sinus pressure, sinus pain, sneezing, sore throat and voice change  Eyes: Negative for discharge and visual disturbance  Respiratory: Negative for cough, choking, chest tightness, shortness of breath, wheezing and stridor  Cardiovascular: Negative for chest pain, palpitations and leg swelling  Gastrointestinal: Negative for abdominal distention, abdominal pain, constipation, diarrhea, nausea and vomiting  Endocrine: Negative for polydipsia, polyphagia and polyuria  Genitourinary: Negative for difficulty urinating, dysuria, frequency and urgency  Musculoskeletal: Positive for gait problem  Negative for arthralgias, back pain, joint swelling, myalgias, neck pain and neck stiffness  Skin: Negative for color change, rash and wound  Neurological: Negative for dizziness, syncope, speech difficulty, weakness, light-headedness and headaches  Hematological: Negative for adenopathy  Does not bruise/bleed easily  Psychiatric/Behavioral: Negative for agitation, behavioral problems, confusion, hallucinations, sleep disturbance and suicidal ideas  The patient is not nervous/anxious            Current Medications       Current Outpatient Medications:     AMILoride-hydrochlorothiazide (MODURETIC) 5-50 mg per tablet, Take 1 tablet by mouth daily, Disp: 90 tablet, Rfl: 3    amLODIPine (NORVASC) 10 mg tablet, Take 1 tablet (10 mg total) by mouth daily, Disp: 90 tablet, Rfl: 2    glucose blood (ONE TOUCH ULTRA TEST) test strip, Use as instructed, Disp: 100 each, Rfl: 3    glyBURIDE-metFORMIN (GLUCOVANCE) 5-500 MG per tablet, Take 1 tablet by mouth see administration instructions Take 1 1/2 tablets  twice daily, Disp: 270 tablet, Rfl: 3    insulin aspart protamine-insulin aspart (NovoLOG Mix 70/30 FlexPen) 100 Units/mL injection pen, Inject 30 Units under the skin 2 (two) times a day before meals, Disp: 20 pen, Rfl: 11    Insulin Pen Needle (PEN NEEDLES) 32G X 4 MM MISC, by Does not apply route 2 (two) times a day, Disp: 100 each, Rfl: 2    irbesartan (AVAPRO) 150 mg tablet, Take 1 tablet (150 mg total) by mouth daily, Disp: 90 tablet, Rfl: 1    levothyroxine 100 mcg tablet, Take 1 tablet (100 mcg total) by mouth daily, Disp: 90 tablet, Rfl: 2    pantoprazole (PROTONIX) 40 mg tablet, Take 1 tablet (40 mg total) by mouth daily, Disp: 90 tablet, Rfl: 1    potassium chloride (K-DUR) 10 mEq tablet, Take 1 tablet (10 mEq total) by mouth daily, Disp: 90 tablet, Rfl: 3    pravastatin (PRAVACHOL) 40 mg tablet, Take 1 tablet (40 mg total) by mouth daily, Disp: 90 tablet, Rfl: 2    Current Allergies     Allergies as of 01/31/2020 - Reviewed 01/31/2020   Allergen Reaction Noted    Iv dye [iodinated diagnostic agents]  08/02/2018    Percocet [oxycodone-acetaminophen]  08/02/2018    Phenobarbital  08/02/2018    Statins  08/02/2018            The following portions of the patient's history were reviewed and updated as appropriate: allergies, current medications, past family history, past medical history, past social history, past surgical history and problem list      Past Medical History:   Diagnosis Date    Anemia     Basal cell carcinoma of skin of face     Bleeding ulcer     Hypercholesterolemia     Hypertension     Hypothyroidism     Insulin dependent diabetes mellitus ([facility] [address] )        Past Surgical History:   Procedure Laterality Date    CATARACT EXTRACTION Bilateral     MOHS SURGERY      REPLACEMENT TOTAL KNEE Right     SINUS SURGERY      TONSILLECTOMY      KAILASH TOOTH EXTRACTION         Family History   Problem Relation Age of Onset    Lung cancer Sister          Medications have been verified  Objective   /70   Pulse (!) 109   Temp (!) 100 8 °F (38 2 °C) (Tympanic)   Resp 18   Ht 5' 4" (1 626 m)   Wt 79 8 kg (176 lb)   SpO2 98%   BMI 30 21 kg/m²        Physical Exam     Physical Exam   Constitutional: She is oriented to person, place, and time  Vital signs are normal  She appears well-developed and well-nourished  She is active and cooperative  No distress  Eyes: EOM are normal    Cardiovascular: Regular rhythm  Tachycardia present  Murmur heard  Pulmonary/Chest: Effort normal and breath sounds normal  No respiratory distress  She has no wheezes  Neurological: She is alert and oriented to person, place, and time  Skin: Skin is warm and dry  No rash noted  She is not diaphoretic  No erythema  Psychiatric: She has a normal mood and affect  Her behavior is normal  Judgment and thought content normal    Nursing note and vitals reviewed

## 2020-01-31 NOTE — TELEPHONE ENCOUNTER
Confirmed the medication with patient daughter it is metformin and gliburude 1 tab and 1/2 twice daily

## 2020-01-31 NOTE — TELEPHONE ENCOUNTER
She can continue taking it the way she has been- can you update the medlist and resend me the order [facility]?

## 2020-02-01 RX ORDER — GLYBURIDE-METFORMIN HYDROCHLORIDE 5; 500 MG/1; MG/1
1 TABLET ORAL SEE ADMIN INSTRUCTIONS
Qty: 270 TABLET | Refills: 3 | Status: SHIPPED | OUTPATIENT
Start: 2020-02-01 | End: 2020-03-23 | Stop reason: SDUPTHER

## 2020-02-03 ENCOUNTER — TELEPHONE (OUTPATIENT)
Dept: FAMILY MEDICINE CLINIC | Facility: CLINIC | Age: 85
End: 2020-02-03

## 2020-02-03 DIAGNOSIS — E11.42 TYPE 2 DIABETES MELLITUS WITH DIABETIC POLYNEUROPATHY, WITH LONG-TERM CURRENT USE OF INSULIN (HCC): Primary | ICD-10-CM

## 2020-02-03 DIAGNOSIS — R53.1 INCREASED WEAKNESS WHEN AMBULATING: ICD-10-CM

## 2020-02-03 DIAGNOSIS — Z79.4 TYPE 2 DIABETES MELLITUS WITH DIABETIC POLYNEUROPATHY, WITH LONG-TERM CURRENT USE OF INSULIN (HCC): Primary | ICD-10-CM

## 2020-02-03 NOTE — TELEPHONE ENCOUNTER
Home Health contacted the office requesting to speak with [facility] regarding the ambulatory referral placed today  After reviewing the chart they determined the patient didn't qualify for home health services  Spoke with patients daughter Kyle Díaz who was concerned only on a short term basis with the patient having a recent UTI  I explained that [address] through the [place] is for a skilled nursing need and at this time the patient doesn't have that need  She is not experiencing any gait disturbance or issues with her mobility at this time  The daughter agreed skilled nursing is not needed  I provided several home health service companies that could come out and work with the patient on an hourly basis  Kyle Díaz felt this would be a good option and will be reaching out to them today

## 2020-02-05 ENCOUNTER — TELEPHONE (OUTPATIENT)
Dept: FAMILY MEDICINE CLINIC | Facility: CLINIC | Age: 85
End: 2020-02-05

## 2020-02-05 DIAGNOSIS — E11.42 TYPE 2 DIABETES MELLITUS WITH DIABETIC POLYNEUROPATHY, WITH LONG-TERM CURRENT USE OF INSULIN (HCC): Primary | ICD-10-CM

## 2020-02-05 DIAGNOSIS — Z79.4 TYPE 2 DIABETES MELLITUS WITH DIABETIC POLYNEUROPATHY, WITH LONG-TERM CURRENT USE OF INSULIN (HCC): Primary | ICD-10-CM

## 2020-02-07 NOTE — TELEPHONE ENCOUNTER
Pharmacist from [address] [place] [address] called to verify patient checks blood sugars twice daily

## 2020-02-10 ENCOUNTER — TELEPHONE (OUTPATIENT)
Dept: FAMILY MEDICINE CLINIC | Facility: CLINIC | Age: 85
End: 2020-02-10

## 2020-02-10 NOTE — TELEPHONE ENCOUNTER
Pt recently had UTI did get medication  Daughter still feels she is not quite up to her full mental status      She is coming in wed for an appointment and is asking if she can be retested for the UTI  When here at appointment    She would liked to a return call        Please advise    MAYNOR:[phone]

## 2020-02-12 ENCOUNTER — OFFICE VISIT (OUTPATIENT)
Dept: FAMILY MEDICINE CLINIC | Facility: CLINIC | Age: 85
End: 2020-02-12
Payer: MEDICARE

## 2020-02-12 ENCOUNTER — APPOINTMENT (OUTPATIENT)
Dept: LAB | Facility: CLINIC | Age: 85
End: 2020-02-12
Payer: MEDICARE

## 2020-02-12 VITALS
DIASTOLIC BLOOD PRESSURE: 66 MMHG | HEIGHT: 64 IN | BODY MASS INDEX: 29.37 KG/M2 | RESPIRATION RATE: 18 BRPM | WEIGHT: 172 LBS | SYSTOLIC BLOOD PRESSURE: 122 MMHG | OXYGEN SATURATION: 99 % | HEART RATE: 60 BPM | TEMPERATURE: 100.4 F

## 2020-02-12 DIAGNOSIS — Z79.4 TYPE 2 DIABETES MELLITUS WITH DIABETIC POLYNEUROPATHY, WITH LONG-TERM CURRENT USE OF INSULIN (HCC): Primary | ICD-10-CM

## 2020-02-12 DIAGNOSIS — Z48.02 ENCOUNTER FOR REMOVAL OF SUTURES: ICD-10-CM

## 2020-02-12 DIAGNOSIS — E03.9 ACQUIRED HYPOTHYROIDISM: ICD-10-CM

## 2020-02-12 DIAGNOSIS — E11.42 TYPE 2 DIABETES MELLITUS WITH DIABETIC POLYNEUROPATHY, WITH LONG-TERM CURRENT USE OF INSULIN (HCC): Primary | ICD-10-CM

## 2020-02-12 DIAGNOSIS — E11.42 TYPE 2 DIABETES MELLITUS WITH DIABETIC POLYNEUROPATHY, WITH LONG-TERM CURRENT USE OF INSULIN (HCC): ICD-10-CM

## 2020-02-12 DIAGNOSIS — Z79.4 TYPE 2 DIABETES MELLITUS WITH DIABETIC POLYNEUROPATHY, WITH LONG-TERM CURRENT USE OF INSULIN (HCC): ICD-10-CM

## 2020-02-12 LAB
ALBUMIN SERPL BCP-MCNC: 3.6 G/DL (ref 3.5–5)
ALP SERPL-CCNC: 85 U/L (ref 46–116)
ALT SERPL W P-5'-P-CCNC: 20 U/L (ref 12–78)
ANION GAP SERPL CALCULATED.3IONS-SCNC: 3 MMOL/L (ref 4–13)
AST SERPL W P-5'-P-CCNC: 17 U/L (ref 5–45)
BACTERIA UR QL AUTO: ABNORMAL /HPF
BILIRUB SERPL-MCNC: 0.44 MG/DL (ref 0.2–1)
BILIRUB UR QL STRIP: ABNORMAL
BUN SERPL-MCNC: 23 MG/DL (ref 5–25)
CALCIUM SERPL-MCNC: 10 MG/DL (ref 8.3–10.1)
CHLORIDE SERPL-SCNC: 106 MMOL/L (ref 100–108)
CLARITY UR: CLEAR
CO2 SERPL-SCNC: 26 MMOL/L (ref 21–32)
COLOR UR: ABNORMAL
CREAT SERPL-MCNC: 1.32 MG/DL (ref 0.6–1.3)
CREAT UR-MCNC: 266 MG/DL
EST. AVERAGE GLUCOSE BLD GHB EST-MCNC: 174 MG/DL
GFR SERPL CREATININE-BSD FRML MDRD: 35 ML/MIN/1.73SQ M
GLUCOSE P FAST SERPL-MCNC: 241 MG/DL (ref 65–99)
GLUCOSE UR STRIP-MCNC: NEGATIVE MG/DL
HBA1C MFR BLD: 7.7 %
HGB UR QL STRIP.AUTO: NEGATIVE
HYALINE CASTS #/AREA URNS LPF: ABNORMAL /LPF
KETONES UR STRIP-MCNC: ABNORMAL MG/DL
LEUKOCYTE ESTERASE UR QL STRIP: ABNORMAL
MICROALBUMIN UR-MCNC: 236 MG/L (ref 0–20)
MICROALBUMIN/CREAT 24H UR: 89 MG/G CREATININE (ref 0–30)
NITRITE UR QL STRIP: NEGATIVE
NON-SQ EPI CELLS URNS QL MICRO: ABNORMAL /HPF
PH UR STRIP.AUTO: 5 [PH]
POTASSIUM SERPL-SCNC: 5.5 MMOL/L (ref 3.5–5.3)
PROT SERPL-MCNC: 8.1 G/DL (ref 6.4–8.2)
PROT UR STRIP-MCNC: ABNORMAL MG/DL
RBC #/AREA URNS AUTO: ABNORMAL /HPF
SODIUM SERPL-SCNC: 135 MMOL/L (ref 136–145)
SP GR UR STRIP.AUTO: 1.03 (ref 1–1.03)
T4 FREE SERPL-MCNC: 1.08 NG/DL (ref 0.76–1.46)
TSH SERPL DL<=0.05 MIU/L-ACNC: 6.15 UIU/ML (ref 0.36–3.74)
UROBILINOGEN UR QL STRIP.AUTO: 0.2 E.U./DL
WBC #/AREA URNS AUTO: ABNORMAL /HPF

## 2020-02-12 PROCEDURE — 82043 UR ALBUMIN QUANTITATIVE: CPT | Performed by: NURSE PRACTITIONER

## 2020-02-12 PROCEDURE — 99212 OFFICE O/P EST SF 10 MIN: CPT | Performed by: NURSE PRACTITIONER

## 2020-02-12 PROCEDURE — 3066F NEPHROPATHY DOC TX: CPT | Performed by: NURSE PRACTITIONER

## 2020-02-12 PROCEDURE — 1036F TOBACCO NON-USER: CPT | Performed by: NURSE PRACTITIONER

## 2020-02-12 PROCEDURE — 82570 ASSAY OF URINE CREATININE: CPT | Performed by: NURSE PRACTITIONER

## 2020-02-12 PROCEDURE — 36415 COLL VENOUS BLD VENIPUNCTURE: CPT

## 2020-02-12 PROCEDURE — 80053 COMPREHEN METABOLIC PANEL: CPT

## 2020-02-12 PROCEDURE — 3074F SYST BP LT 130 MM HG: CPT | Performed by: NURSE PRACTITIONER

## 2020-02-12 PROCEDURE — 84443 ASSAY THYROID STIM HORMONE: CPT

## 2020-02-12 PROCEDURE — 83036 HEMOGLOBIN GLYCOSYLATED A1C: CPT

## 2020-02-12 PROCEDURE — 1160F RVW MEDS BY RX/DR IN RCRD: CPT | Performed by: NURSE PRACTITIONER

## 2020-02-12 PROCEDURE — 4040F PNEUMOC VAC/ADMIN/RCVD: CPT | Performed by: NURSE PRACTITIONER

## 2020-02-12 PROCEDURE — 3060F POS MICROALBUMINURIA REV: CPT | Performed by: NURSE PRACTITIONER

## 2020-02-12 PROCEDURE — 3008F BODY MASS INDEX DOCD: CPT | Performed by: NURSE PRACTITIONER

## 2020-02-12 PROCEDURE — 81001 URINALYSIS AUTO W/SCOPE: CPT | Performed by: NURSE PRACTITIONER

## 2020-02-12 PROCEDURE — 3078F DIAST BP <80 MM HG: CPT | Performed by: NURSE PRACTITIONER

## 2020-02-12 PROCEDURE — 84439 ASSAY OF FREE THYROXINE: CPT

## 2020-02-12 RX ORDER — PEN NEEDLE, DIABETIC 30 GX3/16"
NEEDLE, DISPOSABLE MISCELLANEOUS 2 TIMES DAILY
Qty: 100 EACH | Refills: 2 | Status: SHIPPED | OUTPATIENT
Start: 2020-02-12 | End: 2020-03-23 | Stop reason: SDUPTHER

## 2020-02-12 NOTE — PROGRESS NOTES
Suture removal  Date/Time: 2/12/2020 2:16 PM  Performed by: KUSH Garcia  Authorized by: KUSH Garcia     Patient location:  Other (comment)  Consent:     Consent obtained:  Verbal    Consent given by:  Patient    Risks discussed:  Bleeding, wound separation and pain  Universal protocol:     Patient identity confirmed:  Verbally with patient  Location:     Laterality:  Right    Location:  Other (comment) (neck)  Procedure details: Tools used:  Suture removal kit    Wound appearance:  No sign(s) of infection, nontender, good wound healing and clean    Number of sutures removed:  2  Post-procedure details:     Post-removal:  No dressing applied (patient and daughter report they usually leave open to air)    Patient tolerance of procedure: Tolerated well, no immediate complications  Comments:      Squamous cell carcinoma removed by dermatology, did not want to drive to [place] for suture removal  2 running sutures noted and removed without difficulty

## 2020-02-12 NOTE — PROGRESS NOTES
[facility]        NAME: Karl Cates is a 80 y o  female  : 1929    MRN: [id number]  DATE: 2020  TIME: 1:42 PM    Assessment and Plan   Type 2 diabetes mellitus with diabetic polyneuropathy, with long-term current use of insulin (HCC) [E11 42, Z79 4]  1  Type 2 diabetes mellitus with diabetic polyneuropathy, with long-term current use of insulin (HCC)  Insulin Pen Needle (PEN NEEDLES) 32G X 4 MM MISC         Patient Instructions     There are no Patient Instructions on file for this visit          Chief Complaint     Chief Complaint   Patient presents with    suture removal         History of Present Illness       HPI    Review of Systems   Review of Systems    PHQ-9 Depression Screening    PHQ-9:    Frequency of the following problems over the past two weeks:             Current Medications       Current Outpatient Medications:     AMILoride-hydrochlorothiazide (MODURETIC) 5-50 mg per tablet, Take 1 tablet by mouth daily, Disp: 90 tablet, Rfl: 3    amLODIPine (NORVASC) 10 mg tablet, Take 1 tablet (10 mg total) by mouth daily, Disp: 90 tablet, Rfl: 2    glucose blood (ONE TOUCH ULTRA TEST) test strip, Use as instructed, Disp: 100 each, Rfl: 3    glyBURIDE-metFORMIN (GLUCOVANCE) 5-500 MG per tablet, Take 1 tablet by mouth see administration instructions Take 1 1/2 tablets  twice daily, Disp: 270 tablet, Rfl: 3    insulin aspart protamine-insulin aspart (NOVOLOG MIX 70/30 FLEXPEN) 100 Units/mL injection pen, Inject 30 Units under the skin 2 (two) times a day before meals, Disp: 20 pen, Rfl: 11    irbesartan (AVAPRO) 150 mg tablet, Take 1 tablet (150 mg total) by mouth daily, Disp: 90 tablet, Rfl: 1    levothyroxine 100 mcg tablet, Take 1 tablet (100 mcg total) by mouth daily, Disp: 90 tablet, Rfl: 2    pantoprazole (PROTONIX) 40 mg tablet, Take 1 tablet (40 mg total) by mouth daily, Disp: 90 tablet, Rfl: 1    potassium chloride (K-DUR) 10 mEq tablet, Take 1 tablet (10 mEq total) by mouth daily, Disp: 90 tablet, Rfl: 3    pravastatin (PRAVACHOL) 40 mg tablet, Take 1 tablet (40 mg total) by mouth daily, Disp: 90 tablet, Rfl: 2    glyBURIDE (DIABETA) 5 mg tablet, Take 1 tablet (5 mg total) by mouth 2 (two) times a day with meals (Patient not taking: Reported on 2/12/2020), Disp: 180 tablet, Rfl: 1    metFORMIN (GLUCOPHAGE) 500 mg tablet, Take 1 tablet (500 mg total) by mouth 2 (two) times a day with meals (Patient not taking: Reported on 2/12/2020), Disp: 180 tablet, Rfl: 1    Current Allergies     Allergies as of 02/12/2020 - Reviewed 02/12/2020   Allergen Reaction Noted    Iv dye [iodinated diagnostic agents]  08/02/2018    Percocet [oxycodone-acetaminophen]  08/02/2018    Phenobarbital  08/02/2018    Statins  08/02/2018            The following portions of the patient's history were reviewed and updated as appropriate: allergies, current medications, past family history, past medical history, past social history, past surgical history and problem list      Past Medical History:   Diagnosis Date    Anemia     Basal cell carcinoma of skin of face     Bleeding ulcer     Hypercholesterolemia     Hypertension     Hypothyroidism     Insulin dependent diabetes mellitus ([facility] [address] )        Past Surgical History:   Procedure Laterality Date    CATARACT EXTRACTION Bilateral     MOHS SURGERY      REPLACEMENT TOTAL KNEE Right     SINUS SURGERY      TONSILLECTOMY      WISDOM TOOTH EXTRACTION         Family History   Problem Relation Age of Onset    Lung cancer Sister          Medications have been verified          Objective   /66   Pulse 60   Temp 100 4 °F (38 °C)   Resp 18   Ht 5' 4" (1 626 m)   Wt 78 kg (172 lb)   SpO2 99%   BMI 29 52 kg/m²        Physical Exam     Physical Exam

## 2020-02-12 NOTE — PROGRESS NOTES
Assessment/Plan:      Diagnoses and all orders for this visit:    Type 2 diabetes mellitus with diabetic polyneuropathy, with long-term current use of insulin (HCC)  -     Insulin Pen Needle (PEN NEEDLES) 32G X 4 MM MISC; by Does not apply route 2 (two) times a day    Encounter for removal of sutures  -     Suture removal          Subjective:     Patient ID: Matthew Cummings is a 80 y o  female  Here for suture removal , needs refill of needles  No current complaints         Review of Systems      Objective:     Physical Exam

## 2020-02-17 ENCOUNTER — TELEPHONE (OUTPATIENT)
Dept: FAMILY MEDICINE CLINIC | Facility: CLINIC | Age: 85
End: 2020-02-17

## 2020-02-17 NOTE — TELEPHONE ENCOUNTER
Order another 10 days of Augmentin for possible UTI- if the daughter thinks her mentation is worsening during this time we should hear from her immediately

## 2020-02-17 NOTE — TELEPHONE ENCOUNTER
Patients daughter states she is not back to herself mentally, and this is just not her  Laverda Clayman states she was not having UTI symptoms before, so they're not sure if she still has one or not

## 2020-02-17 NOTE — TELEPHONE ENCOUNTER
Patients daughter calling looking for lab results  Please advise  I did look in her chart and could not find a note?

## 2020-02-17 NOTE — TELEPHONE ENCOUNTER
Can we find out why a urine culture wasn't done? That's what I was waiting for  Sodium is slightly low- she should salt in diet- fasting glucose is very high but her 3 month average is actually better- so continue same diabetes medications/doses  If she is having UTI symptoms send me an RX for the same antibiotic we recently used- I don't have a culture though so it would be tough to say for sure if she has a UTI  Thanks!

## 2020-02-18 DIAGNOSIS — N39.0 URINARY TRACT INFECTION WITHOUT HEMATURIA, SITE UNSPECIFIED: Primary | ICD-10-CM

## 2020-02-18 RX ORDER — AMOXICILLIN AND CLAVULANATE POTASSIUM 875; 125 MG/1; MG/1
1 TABLET, FILM COATED ORAL EVERY 12 HOURS SCHEDULED
Qty: 20 TABLET | Refills: 0 | Status: SHIPPED | OUTPATIENT
Start: 2020-02-18 | End: 2020-02-28

## 2020-03-23 DIAGNOSIS — E10.9 TYPE 1 DIABETES MELLITUS WITHOUT COMPLICATION (HCC): ICD-10-CM

## 2020-03-23 DIAGNOSIS — I10 ESSENTIAL HYPERTENSION: ICD-10-CM

## 2020-03-23 DIAGNOSIS — E78.5 HYPERLIPIDEMIA, UNSPECIFIED HYPERLIPIDEMIA TYPE: ICD-10-CM

## 2020-03-23 DIAGNOSIS — E11.42 TYPE 2 DIABETES MELLITUS WITH DIABETIC POLYNEUROPATHY, WITH LONG-TERM CURRENT USE OF INSULIN (HCC): ICD-10-CM

## 2020-03-23 DIAGNOSIS — K21.9 GASTROESOPHAGEAL REFLUX DISEASE WITHOUT ESOPHAGITIS: ICD-10-CM

## 2020-03-23 DIAGNOSIS — Z76.0 MEDICATION REFILL: ICD-10-CM

## 2020-03-23 DIAGNOSIS — Z79.4 TYPE 2 DIABETES MELLITUS WITH DIABETIC POLYNEUROPATHY, WITH LONG-TERM CURRENT USE OF INSULIN (HCC): ICD-10-CM

## 2020-03-23 DIAGNOSIS — E03.9 HYPOTHYROIDISM, UNSPECIFIED TYPE: ICD-10-CM

## 2020-03-23 RX ORDER — POTASSIUM CHLORIDE 750 MG/1
10 TABLET, FILM COATED, EXTENDED RELEASE ORAL DAILY
Qty: 90 TABLET | Refills: 3 | Status: SHIPPED | OUTPATIENT
Start: 2020-03-23 | End: 2022-04-28 | Stop reason: HOSPADM

## 2020-03-23 RX ORDER — AMLODIPINE BESYLATE 10 MG/1
10 TABLET ORAL DAILY
Qty: 90 TABLET | Refills: 2 | Status: SHIPPED | OUTPATIENT
Start: 2020-03-23 | End: 2022-04-28 | Stop reason: HOSPADM

## 2020-03-23 RX ORDER — IRBESARTAN 150 MG/1
150 TABLET ORAL DAILY
Qty: 90 TABLET | Refills: 1 | Status: SHIPPED | OUTPATIENT
Start: 2020-03-23 | End: 2022-04-28 | Stop reason: HOSPADM

## 2020-03-23 RX ORDER — PEN NEEDLE, DIABETIC 30 GX3/16"
NEEDLE, DISPOSABLE MISCELLANEOUS 2 TIMES DAILY
Qty: 100 EACH | Refills: 2 | Status: SHIPPED | OUTPATIENT
Start: 2020-03-23 | End: 2022-07-06 | Stop reason: ALTCHOICE

## 2020-03-23 RX ORDER — LEVOTHYROXINE SODIUM 0.1 MG/1
100 TABLET ORAL DAILY
Qty: 90 TABLET | Refills: 2 | Status: SHIPPED | OUTPATIENT
Start: 2020-03-23 | End: 2022-07-06 | Stop reason: ALTCHOICE

## 2020-03-23 RX ORDER — PRAVASTATIN SODIUM 40 MG
40 TABLET ORAL DAILY
Qty: 90 TABLET | Refills: 2 | Status: SHIPPED | OUTPATIENT
Start: 2020-03-23 | End: 2022-04-28 | Stop reason: HOSPADM

## 2020-03-23 RX ORDER — GLYBURIDE-METFORMIN HYDROCHLORIDE 5; 500 MG/1; MG/1
1 TABLET ORAL SEE ADMIN INSTRUCTIONS
Qty: 270 TABLET | Refills: 3 | Status: SHIPPED | OUTPATIENT
Start: 2020-03-23 | End: 2022-04-28 | Stop reason: HOSPADM

## 2020-03-23 RX ORDER — AMILORIDE HYDROCHLORIDE AND HYDROCHLOROTHIAZIDE 5; 50 MG/1; MG/1
1 TABLET ORAL DAILY
Qty: 90 TABLET | Refills: 3 | Status: SHIPPED | OUTPATIENT
Start: 2020-03-23 | End: 2021-12-06 | Stop reason: HOSPADM

## 2020-03-23 RX ORDER — PANTOPRAZOLE SODIUM 40 MG/1
40 TABLET, DELAYED RELEASE ORAL DAILY
Qty: 90 TABLET | Refills: 1 | Status: SHIPPED | OUTPATIENT
Start: 2020-03-23 | End: 2022-07-06 | Stop reason: ALTCHOICE

## 2020-05-06 LAB — HBA1C MFR BLD HPLC: 7.2 %

## 2020-07-22 ENCOUNTER — TELEPHONE (OUTPATIENT)
Dept: FAMILY MEDICINE CLINIC | Facility: CLINIC | Age: 85
End: 2020-07-22

## 2020-10-19 ENCOUNTER — TELEPHONE (OUTPATIENT)
Dept: FAMILY MEDICINE CLINIC | Facility: CLINIC | Age: 85
End: 2020-10-19

## 2021-12-03 ENCOUNTER — HOSPITAL ENCOUNTER (INPATIENT)
Facility: HOSPITAL | Age: 86
LOS: 3 days | Discharge: HOME HEALTH | DRG: 193 | End: 2021-12-06
Attending: EMERGENCY MEDICINE | Admitting: INTERNAL MEDICINE
Payer: MEDICARE

## 2021-12-03 ENCOUNTER — APPOINTMENT (EMERGENCY)
Dept: RADIOLOGY | Facility: HOSPITAL | Age: 86
DRG: 193 | End: 2021-12-03
Payer: MEDICARE

## 2021-12-03 DIAGNOSIS — J18.9 PNEUMONIA: Primary | ICD-10-CM

## 2021-12-03 PROBLEM — E83.42 HYPOMAGNESEMIA: Status: ACTIVE | Noted: 2021-12-03

## 2021-12-03 PROBLEM — I10 PRIMARY HYPERTENSION: Chronic | Status: ACTIVE | Noted: 2018-08-02

## 2021-12-03 PROBLEM — E78.49 OTHER HYPERLIPIDEMIA: Chronic | Status: ACTIVE | Noted: 2018-09-06

## 2021-12-03 LAB
2HR DELTA HS TROPONIN: 1 NG/L
4HR DELTA HS TROPONIN: 2 NG/L
ALBUMIN SERPL BCP-MCNC: 4.2 G/DL (ref 3.5–5)
ALP SERPL-CCNC: 63 U/L (ref 34–104)
ALT SERPL W P-5'-P-CCNC: 14 U/L (ref 7–52)
ANION GAP SERPL CALCULATED.3IONS-SCNC: 12 MMOL/L (ref 4–13)
APTT PPP: 42 SECONDS (ref 23–37)
AST SERPL W P-5'-P-CCNC: 23 U/L (ref 13–39)
ATRIAL RATE: 88 BPM
ATRIAL RATE: 91 BPM
BASOPHILS # BLD AUTO: 0.03 THOUSANDS/ΜL (ref 0–0.1)
BASOPHILS NFR BLD AUTO: 0 % (ref 0–1)
BILIRUB SERPL-MCNC: 0.54 MG/DL (ref 0.2–1)
BNP SERPL-MCNC: 81 PG/ML (ref 1–100)
BUN SERPL-MCNC: 32 MG/DL (ref 5–25)
CALCIUM SERPL-MCNC: 10.1 MG/DL (ref 8.4–10.2)
CARDIAC TROPONIN I PNL SERPL HS: 10 NG/L
CARDIAC TROPONIN I PNL SERPL HS: 11 NG/L
CARDIAC TROPONIN I PNL SERPL HS: 9 NG/L
CHLORIDE SERPL-SCNC: 101 MMOL/L (ref 96–108)
CO2 SERPL-SCNC: 23 MMOL/L (ref 21–32)
CREAT SERPL-MCNC: 1.17 MG/DL (ref 0.6–1.3)
EOSINOPHIL # BLD AUTO: 0.19 THOUSAND/ΜL (ref 0–0.61)
EOSINOPHIL NFR BLD AUTO: 2 % (ref 0–6)
ERYTHROCYTE [DISTWIDTH] IN BLOOD BY AUTOMATED COUNT: 15.7 % (ref 11.6–15.1)
FLUAV RNA RESP QL NAA+PROBE: NEGATIVE
FLUBV RNA RESP QL NAA+PROBE: NEGATIVE
GFR SERPL CREATININE-BSD FRML MDRD: 41 ML/MIN/1.73SQ M
GLUCOSE SERPL-MCNC: 170 MG/DL (ref 65–140)
GLUCOSE SERPL-MCNC: 242 MG/DL (ref 65–140)
HCT VFR BLD AUTO: 44.7 % (ref 34.8–46.1)
HGB BLD-MCNC: 13.8 G/DL (ref 11.5–15.4)
IMM GRANULOCYTES # BLD AUTO: 0.02 THOUSAND/UL (ref 0–0.2)
IMM GRANULOCYTES NFR BLD AUTO: 0 % (ref 0–2)
INR PPP: 1.05 (ref 0.84–1.19)
LIPASE SERPL-CCNC: 71 U/L (ref 11–82)
LYMPHOCYTES # BLD AUTO: 2.07 THOUSANDS/ΜL (ref 0.6–4.47)
LYMPHOCYTES NFR BLD AUTO: 24 % (ref 14–44)
MAGNESIUM SERPL-MCNC: 1.2 MG/DL (ref 1.9–2.7)
MCH RBC QN AUTO: 27.9 PG (ref 26.8–34.3)
MCHC RBC AUTO-ENTMCNC: 30.9 G/DL (ref 31.4–37.4)
MCV RBC AUTO: 91 FL (ref 82–98)
MONOCYTES # BLD AUTO: 0.72 THOUSAND/ΜL (ref 0.17–1.22)
MONOCYTES NFR BLD AUTO: 8 % (ref 4–12)
NEUTROPHILS # BLD AUTO: 5.6 THOUSANDS/ΜL (ref 1.85–7.62)
NEUTS SEG NFR BLD AUTO: 66 % (ref 43–75)
NRBC BLD AUTO-RTO: 0 /100 WBCS
P AXIS: 38 DEGREES
P AXIS: 56 DEGREES
PLATELET # BLD AUTO: 250 THOUSANDS/UL (ref 149–390)
PMV BLD AUTO: 11.7 FL (ref 8.9–12.7)
POTASSIUM SERPL-SCNC: 5.1 MMOL/L (ref 3.5–5.3)
PR INTERVAL: 160 MS
PR INTERVAL: 164 MS
PROT SERPL-MCNC: 8.3 G/DL (ref 6.4–8.4)
PROTHROMBIN TIME: 13.6 SECONDS (ref 11.6–14.5)
QRS AXIS: -79 DEGREES
QRS AXIS: -80 DEGREES
QRSD INTERVAL: 128 MS
QRSD INTERVAL: 128 MS
QT INTERVAL: 404 MS
QT INTERVAL: 414 MS
QTC INTERVAL: 496 MS
QTC INTERVAL: 500 MS
RBC # BLD AUTO: 4.94 MILLION/UL (ref 3.81–5.12)
RSV RNA RESP QL NAA+PROBE: NEGATIVE
SARS-COV-2 RNA RESP QL NAA+PROBE: NEGATIVE
SODIUM SERPL-SCNC: 136 MMOL/L (ref 135–147)
T WAVE AXIS: 18 DEGREES
T WAVE AXIS: 27 DEGREES
VENTRICULAR RATE: 88 BPM
VENTRICULAR RATE: 91 BPM
WBC # BLD AUTO: 8.63 THOUSAND/UL (ref 4.31–10.16)

## 2021-12-03 PROCEDURE — 93005 ELECTROCARDIOGRAM TRACING: CPT

## 2021-12-03 PROCEDURE — 0241U HB NFCT DS VIR RESP RNA 4 TRGT: CPT | Performed by: EMERGENCY MEDICINE

## 2021-12-03 PROCEDURE — 83690 ASSAY OF LIPASE: CPT | Performed by: EMERGENCY MEDICINE

## 2021-12-03 PROCEDURE — 96366 THER/PROPH/DIAG IV INF ADDON: CPT

## 2021-12-03 PROCEDURE — 36415 COLL VENOUS BLD VENIPUNCTURE: CPT | Performed by: EMERGENCY MEDICINE

## 2021-12-03 PROCEDURE — 93010 ELECTROCARDIOGRAM REPORT: CPT | Performed by: INTERNAL MEDICINE

## 2021-12-03 PROCEDURE — 87449 NOS EACH ORGANISM AG IA: CPT | Performed by: PHYSICIAN ASSISTANT

## 2021-12-03 PROCEDURE — 1123F ACP DISCUSS/DSCN MKR DOCD: CPT | Performed by: EMERGENCY MEDICINE

## 2021-12-03 PROCEDURE — 99223 1ST HOSP IP/OBS HIGH 75: CPT | Performed by: PHYSICIAN ASSISTANT

## 2021-12-03 PROCEDURE — 99285 EMERGENCY DEPT VISIT HI MDM: CPT | Performed by: EMERGENCY MEDICINE

## 2021-12-03 PROCEDURE — 82948 REAGENT STRIP/BLOOD GLUCOSE: CPT

## 2021-12-03 PROCEDURE — 99285 EMERGENCY DEPT VISIT HI MDM: CPT

## 2021-12-03 PROCEDURE — 85610 PROTHROMBIN TIME: CPT | Performed by: EMERGENCY MEDICINE

## 2021-12-03 PROCEDURE — 83735 ASSAY OF MAGNESIUM: CPT | Performed by: EMERGENCY MEDICINE

## 2021-12-03 PROCEDURE — 85730 THROMBOPLASTIN TIME PARTIAL: CPT | Performed by: EMERGENCY MEDICINE

## 2021-12-03 PROCEDURE — 84145 PROCALCITONIN (PCT): CPT | Performed by: PHYSICIAN ASSISTANT

## 2021-12-03 PROCEDURE — 71045 X-RAY EXAM CHEST 1 VIEW: CPT

## 2021-12-03 PROCEDURE — 96365 THER/PROPH/DIAG IV INF INIT: CPT

## 2021-12-03 PROCEDURE — 80053 COMPREHEN METABOLIC PANEL: CPT | Performed by: EMERGENCY MEDICINE

## 2021-12-03 PROCEDURE — 85025 COMPLETE CBC W/AUTO DIFF WBC: CPT | Performed by: EMERGENCY MEDICINE

## 2021-12-03 PROCEDURE — 83880 ASSAY OF NATRIURETIC PEPTIDE: CPT | Performed by: EMERGENCY MEDICINE

## 2021-12-03 PROCEDURE — 84484 ASSAY OF TROPONIN QUANT: CPT | Performed by: EMERGENCY MEDICINE

## 2021-12-03 RX ORDER — ONDANSETRON 2 MG/ML
4 INJECTION INTRAMUSCULAR; INTRAVENOUS EVERY 6 HOURS PRN
Status: DISCONTINUED | OUTPATIENT
Start: 2021-12-03 | End: 2021-12-06 | Stop reason: HOSPADM

## 2021-12-03 RX ORDER — CEFTRIAXONE 1 G/50ML
1000 INJECTION, SOLUTION INTRAVENOUS ONCE
Status: COMPLETED | OUTPATIENT
Start: 2021-12-03 | End: 2021-12-03

## 2021-12-03 RX ORDER — ACETAMINOPHEN 325 MG/1
650 TABLET ORAL EVERY 4 HOURS PRN
Status: DISCONTINUED | OUTPATIENT
Start: 2021-12-03 | End: 2021-12-06 | Stop reason: HOSPADM

## 2021-12-03 RX ORDER — MAGNESIUM SULFATE HEPTAHYDRATE 40 MG/ML
2 INJECTION, SOLUTION INTRAVENOUS ONCE
Status: COMPLETED | OUTPATIENT
Start: 2021-12-03 | End: 2021-12-04

## 2021-12-03 RX ORDER — SODIUM CHLORIDE 9 MG/ML
100 INJECTION, SOLUTION INTRAVENOUS CONTINUOUS
Status: DISCONTINUED | OUTPATIENT
Start: 2021-12-03 | End: 2021-12-04

## 2021-12-03 RX ORDER — CEFTRIAXONE 1 G/50ML
1000 INJECTION, SOLUTION INTRAVENOUS EVERY 24 HOURS
Status: DISCONTINUED | OUTPATIENT
Start: 2021-12-04 | End: 2021-12-06 | Stop reason: HOSPADM

## 2021-12-03 RX ORDER — GUAIFENESIN/DEXTROMETHORPHAN 100-10MG/5
10 SYRUP ORAL EVERY 4 HOURS PRN
Status: DISCONTINUED | OUTPATIENT
Start: 2021-12-03 | End: 2021-12-06 | Stop reason: HOSPADM

## 2021-12-03 RX ADMIN — INSULIN LISPRO 1 UNITS: 100 INJECTION, SOLUTION INTRAVENOUS; SUBCUTANEOUS at 23:13

## 2021-12-03 RX ADMIN — MAGNESIUM SULFATE HEPTAHYDRATE 2 G: 40 INJECTION, SOLUTION INTRAVENOUS at 17:39

## 2021-12-03 RX ADMIN — MAGNESIUM SULFATE HEPTAHYDRATE 2 G: 40 INJECTION, SOLUTION INTRAVENOUS at 20:46

## 2021-12-03 RX ADMIN — AZITHROMYCIN 500 MG: 500 INJECTION, POWDER, LYOPHILIZED, FOR SOLUTION INTRAVENOUS at 20:35

## 2021-12-03 RX ADMIN — CEFTRIAXONE 1000 MG: 1 INJECTION, SOLUTION INTRAVENOUS at 18:35

## 2021-12-03 RX ADMIN — SODIUM CHLORIDE 100 ML/HR: 0.9 INJECTION, SOLUTION INTRAVENOUS at 20:49

## 2021-12-04 LAB
GLUCOSE SERPL-MCNC: 201 MG/DL (ref 65–140)
GLUCOSE SERPL-MCNC: 256 MG/DL (ref 65–140)
GLUCOSE SERPL-MCNC: 260 MG/DL (ref 65–140)
L PNEUMO1 AG UR QL IA.RAPID: NEGATIVE
MAGNESIUM SERPL-MCNC: 2.3 MG/DL (ref 1.9–2.7)
PROCALCITONIN SERPL-MCNC: <0.05 NG/ML
S PNEUM AG UR QL: NEGATIVE

## 2021-12-04 PROCEDURE — 99232 SBSQ HOSP IP/OBS MODERATE 35: CPT | Performed by: INTERNAL MEDICINE

## 2021-12-04 PROCEDURE — 97163 PT EVAL HIGH COMPLEX 45 MIN: CPT

## 2021-12-04 PROCEDURE — 83735 ASSAY OF MAGNESIUM: CPT | Performed by: PHYSICIAN ASSISTANT

## 2021-12-04 PROCEDURE — 97167 OT EVAL HIGH COMPLEX 60 MIN: CPT

## 2021-12-04 PROCEDURE — 36415 COLL VENOUS BLD VENIPUNCTURE: CPT | Performed by: PHYSICIAN ASSISTANT

## 2021-12-04 PROCEDURE — 82948 REAGENT STRIP/BLOOD GLUCOSE: CPT

## 2021-12-04 RX ORDER — LOSARTAN POTASSIUM 50 MG/1
50 TABLET ORAL DAILY
Status: DISCONTINUED | OUTPATIENT
Start: 2021-12-04 | End: 2021-12-06 | Stop reason: HOSPADM

## 2021-12-04 RX ORDER — LEVOTHYROXINE SODIUM 0.1 MG/1
100 TABLET ORAL
Status: DISCONTINUED | OUTPATIENT
Start: 2021-12-04 | End: 2021-12-06 | Stop reason: HOSPADM

## 2021-12-04 RX ORDER — INSULIN ASPART 100 [IU]/ML
25 INJECTION, SUSPENSION SUBCUTANEOUS
Status: DISCONTINUED | OUTPATIENT
Start: 2021-12-04 | End: 2021-12-06 | Stop reason: HOSPADM

## 2021-12-04 RX ORDER — AMLODIPINE BESYLATE 10 MG/1
10 TABLET ORAL DAILY
Status: DISCONTINUED | OUTPATIENT
Start: 2021-12-04 | End: 2021-12-06 | Stop reason: HOSPADM

## 2021-12-04 RX ORDER — FUROSEMIDE 10 MG/ML
20 INJECTION INTRAMUSCULAR; INTRAVENOUS ONCE
Status: COMPLETED | OUTPATIENT
Start: 2021-12-04 | End: 2021-12-04

## 2021-12-04 RX ORDER — POTASSIUM CHLORIDE 20MEQ/15ML
10 LIQUID (ML) ORAL DAILY
Status: DISCONTINUED | OUTPATIENT
Start: 2021-12-04 | End: 2021-12-04

## 2021-12-04 RX ORDER — PRAVASTATIN SODIUM 40 MG
40 TABLET ORAL
Status: DISCONTINUED | OUTPATIENT
Start: 2021-12-04 | End: 2021-12-06 | Stop reason: HOSPADM

## 2021-12-04 RX ORDER — PANTOPRAZOLE SODIUM 40 MG/1
40 TABLET, DELAYED RELEASE ORAL DAILY
Status: DISCONTINUED | OUTPATIENT
Start: 2021-12-04 | End: 2021-12-06 | Stop reason: HOSPADM

## 2021-12-04 RX ADMIN — LOSARTAN POTASSIUM 50 MG: 50 TABLET, FILM COATED ORAL at 10:59

## 2021-12-04 RX ADMIN — CEFTRIAXONE 1000 MG: 1 INJECTION, SOLUTION INTRAVENOUS at 18:35

## 2021-12-04 RX ADMIN — PANTOPRAZOLE SODIUM 40 MG: 40 TABLET, DELAYED RELEASE ORAL at 11:01

## 2021-12-04 RX ADMIN — LEVOTHYROXINE SODIUM 100 MCG: 100 TABLET ORAL at 11:01

## 2021-12-04 RX ADMIN — AMLODIPINE BESYLATE 10 MG: 10 TABLET ORAL at 11:00

## 2021-12-04 RX ADMIN — FUROSEMIDE 20 MG: 10 INJECTION, SOLUTION INTRAMUSCULAR; INTRAVENOUS at 11:03

## 2021-12-04 RX ADMIN — ENOXAPARIN SODIUM 40 MG: 100 INJECTION SUBCUTANEOUS at 08:50

## 2021-12-04 RX ADMIN — INSULIN LISPRO 3 UNITS: 100 INJECTION, SOLUTION INTRAVENOUS; SUBCUTANEOUS at 13:34

## 2021-12-04 RX ADMIN — INSULIN LISPRO 2 UNITS: 100 INJECTION, SOLUTION INTRAVENOUS; SUBCUTANEOUS at 22:09

## 2021-12-04 RX ADMIN — INSULIN LISPRO 2 UNITS: 100 INJECTION, SOLUTION INTRAVENOUS; SUBCUTANEOUS at 08:48

## 2021-12-05 LAB
ANION GAP SERPL CALCULATED.3IONS-SCNC: 9 MMOL/L (ref 4–13)
BASOPHILS # BLD AUTO: 0.02 THOUSANDS/ΜL (ref 0–0.1)
BASOPHILS NFR BLD AUTO: 0 % (ref 0–1)
BUN SERPL-MCNC: 26 MG/DL (ref 5–25)
CALCIUM SERPL-MCNC: 9.6 MG/DL (ref 8.4–10.2)
CHLORIDE SERPL-SCNC: 101 MMOL/L (ref 96–108)
CO2 SERPL-SCNC: 24 MMOL/L (ref 21–32)
CREAT SERPL-MCNC: 1.02 MG/DL (ref 0.6–1.3)
EOSINOPHIL # BLD AUTO: 0.19 THOUSAND/ΜL (ref 0–0.61)
EOSINOPHIL NFR BLD AUTO: 3 % (ref 0–6)
ERYTHROCYTE [DISTWIDTH] IN BLOOD BY AUTOMATED COUNT: 15.6 % (ref 11.6–15.1)
GFR SERPL CREATININE-BSD FRML MDRD: 48 ML/MIN/1.73SQ M
GLUCOSE SERPL-MCNC: 190 MG/DL (ref 65–140)
GLUCOSE SERPL-MCNC: 197 MG/DL (ref 65–140)
GLUCOSE SERPL-MCNC: 207 MG/DL (ref 65–140)
GLUCOSE SERPL-MCNC: 225 MG/DL (ref 65–140)
GLUCOSE SERPL-MCNC: 360 MG/DL (ref 65–140)
HCT VFR BLD AUTO: 41.7 % (ref 34.8–46.1)
HGB BLD-MCNC: 12.8 G/DL (ref 11.5–15.4)
IMM GRANULOCYTES # BLD AUTO: 0.01 THOUSAND/UL (ref 0–0.2)
IMM GRANULOCYTES NFR BLD AUTO: 0 % (ref 0–2)
LYMPHOCYTES # BLD AUTO: 1.33 THOUSANDS/ΜL (ref 0.6–4.47)
LYMPHOCYTES NFR BLD AUTO: 22 % (ref 14–44)
MAGNESIUM SERPL-MCNC: 1.7 MG/DL (ref 1.9–2.7)
MCH RBC QN AUTO: 27.5 PG (ref 26.8–34.3)
MCHC RBC AUTO-ENTMCNC: 30.7 G/DL (ref 31.4–37.4)
MCV RBC AUTO: 90 FL (ref 82–98)
MONOCYTES # BLD AUTO: 0.76 THOUSAND/ΜL (ref 0.17–1.22)
MONOCYTES NFR BLD AUTO: 13 % (ref 4–12)
NEUTROPHILS # BLD AUTO: 3.73 THOUSANDS/ΜL (ref 1.85–7.62)
NEUTS SEG NFR BLD AUTO: 62 % (ref 43–75)
NRBC BLD AUTO-RTO: 0 /100 WBCS
PLATELET # BLD AUTO: 205 THOUSANDS/UL (ref 149–390)
PMV BLD AUTO: 11.4 FL (ref 8.9–12.7)
POTASSIUM SERPL-SCNC: 4.2 MMOL/L (ref 3.5–5.3)
PROCALCITONIN SERPL-MCNC: 0.06 NG/ML
RBC # BLD AUTO: 4.65 MILLION/UL (ref 3.81–5.12)
SODIUM SERPL-SCNC: 134 MMOL/L (ref 135–147)
WBC # BLD AUTO: 6.04 THOUSAND/UL (ref 4.31–10.16)

## 2021-12-05 PROCEDURE — 82948 REAGENT STRIP/BLOOD GLUCOSE: CPT

## 2021-12-05 PROCEDURE — 85025 COMPLETE CBC W/AUTO DIFF WBC: CPT | Performed by: INTERNAL MEDICINE

## 2021-12-05 PROCEDURE — 83735 ASSAY OF MAGNESIUM: CPT | Performed by: INTERNAL MEDICINE

## 2021-12-05 PROCEDURE — 99232 SBSQ HOSP IP/OBS MODERATE 35: CPT | Performed by: INTERNAL MEDICINE

## 2021-12-05 PROCEDURE — 84145 PROCALCITONIN (PCT): CPT | Performed by: PHYSICIAN ASSISTANT

## 2021-12-05 PROCEDURE — 80048 BASIC METABOLIC PNL TOTAL CA: CPT | Performed by: INTERNAL MEDICINE

## 2021-12-05 RX ORDER — MAGNESIUM SULFATE HEPTAHYDRATE 40 MG/ML
2 INJECTION, SOLUTION INTRAVENOUS ONCE
Status: COMPLETED | OUTPATIENT
Start: 2021-12-05 | End: 2021-12-05

## 2021-12-05 RX ORDER — FUROSEMIDE 10 MG/ML
20 INJECTION INTRAMUSCULAR; INTRAVENOUS ONCE
Status: COMPLETED | OUTPATIENT
Start: 2021-12-05 | End: 2021-12-05

## 2021-12-05 RX ADMIN — INSULIN LISPRO 2 UNITS: 100 INJECTION, SOLUTION INTRAVENOUS; SUBCUTANEOUS at 09:45

## 2021-12-05 RX ADMIN — INSULIN ASPART 25 UNITS: 100 INJECTION, SUSPENSION SUBCUTANEOUS at 09:45

## 2021-12-05 RX ADMIN — PRAVASTATIN SODIUM 40 MG: 40 TABLET ORAL at 17:08

## 2021-12-05 RX ADMIN — LOSARTAN POTASSIUM 50 MG: 50 TABLET, FILM COATED ORAL at 09:44

## 2021-12-05 RX ADMIN — INSULIN LISPRO 2 UNITS: 100 INJECTION, SOLUTION INTRAVENOUS; SUBCUTANEOUS at 17:08

## 2021-12-05 RX ADMIN — AMLODIPINE BESYLATE 10 MG: 10 TABLET ORAL at 09:44

## 2021-12-05 RX ADMIN — FUROSEMIDE 20 MG: 10 INJECTION, SOLUTION INTRAMUSCULAR; INTRAVENOUS at 13:41

## 2021-12-05 RX ADMIN — INSULIN LISPRO 1 UNITS: 100 INJECTION, SOLUTION INTRAVENOUS; SUBCUTANEOUS at 22:40

## 2021-12-05 RX ADMIN — INSULIN ASPART 25 UNITS: 100 INJECTION, SUSPENSION SUBCUTANEOUS at 17:08

## 2021-12-05 RX ADMIN — MAGNESIUM SULFATE HEPTAHYDRATE 2 G: 40 INJECTION, SOLUTION INTRAVENOUS at 09:44

## 2021-12-05 RX ADMIN — PANTOPRAZOLE SODIUM 40 MG: 40 TABLET, DELAYED RELEASE ORAL at 09:44

## 2021-12-05 RX ADMIN — CEFTRIAXONE 1000 MG: 1 INJECTION, SOLUTION INTRAVENOUS at 17:08

## 2021-12-05 RX ADMIN — LEVOTHYROXINE SODIUM 100 MCG: 100 TABLET ORAL at 05:11

## 2021-12-05 RX ADMIN — ENOXAPARIN SODIUM 30 MG: 100 INJECTION SUBCUTANEOUS at 09:44

## 2021-12-05 RX ADMIN — INSULIN LISPRO 6 UNITS: 100 INJECTION, SOLUTION INTRAVENOUS; SUBCUTANEOUS at 12:47

## 2021-12-06 VITALS
TEMPERATURE: 98.7 F | BODY MASS INDEX: 29.92 KG/M2 | SYSTOLIC BLOOD PRESSURE: 108 MMHG | DIASTOLIC BLOOD PRESSURE: 44 MMHG | OXYGEN SATURATION: 86 % | RESPIRATION RATE: 18 BRPM | WEIGHT: 175.27 LBS | HEART RATE: 71 BPM | HEIGHT: 64 IN

## 2021-12-06 PROBLEM — I50.9 CHF (CONGESTIVE HEART FAILURE) (HCC): Status: ACTIVE | Noted: 2021-12-06

## 2021-12-06 PROBLEM — J96.01 ACUTE RESPIRATORY FAILURE WITH HYPOXIA (HCC): Status: ACTIVE | Noted: 2021-12-06

## 2021-12-06 LAB
ANION GAP SERPL CALCULATED.3IONS-SCNC: 10 MMOL/L (ref 4–13)
BASOPHILS # BLD AUTO: 0.03 THOUSANDS/ΜL (ref 0–0.1)
BASOPHILS NFR BLD AUTO: 1 % (ref 0–1)
BUN SERPL-MCNC: 33 MG/DL (ref 5–25)
CALCIUM SERPL-MCNC: 9.5 MG/DL (ref 8.4–10.2)
CHLORIDE SERPL-SCNC: 100 MMOL/L (ref 96–108)
CO2 SERPL-SCNC: 24 MMOL/L (ref 21–32)
CREAT SERPL-MCNC: 1.37 MG/DL (ref 0.6–1.3)
DME PARACHUTE DELIVERY DATE ACTUAL: NORMAL
DME PARACHUTE DELIVERY DATE EXPECTED: NORMAL
DME PARACHUTE DELIVERY DATE REQUESTED: NORMAL
DME PARACHUTE ITEM DESCRIPTION: NORMAL
DME PARACHUTE ORDER STATUS: NORMAL
DME PARACHUTE SUPPLIER NAME: NORMAL
DME PARACHUTE SUPPLIER PHONE: NORMAL
EOSINOPHIL # BLD AUTO: 0.19 THOUSAND/ΜL (ref 0–0.61)
EOSINOPHIL NFR BLD AUTO: 3 % (ref 0–6)
ERYTHROCYTE [DISTWIDTH] IN BLOOD BY AUTOMATED COUNT: 15.4 % (ref 11.6–15.1)
GFR SERPL CREATININE-BSD FRML MDRD: 34 ML/MIN/1.73SQ M
GLUCOSE SERPL-MCNC: 149 MG/DL (ref 65–140)
GLUCOSE SERPL-MCNC: 171 MG/DL (ref 65–140)
GLUCOSE SERPL-MCNC: 225 MG/DL (ref 65–140)
HCT VFR BLD AUTO: 40 % (ref 34.8–46.1)
HGB BLD-MCNC: 12.3 G/DL (ref 11.5–15.4)
IMM GRANULOCYTES # BLD AUTO: 0.01 THOUSAND/UL (ref 0–0.2)
IMM GRANULOCYTES NFR BLD AUTO: 0 % (ref 0–2)
LYMPHOCYTES # BLD AUTO: 1.54 THOUSANDS/ΜL (ref 0.6–4.47)
LYMPHOCYTES NFR BLD AUTO: 26 % (ref 14–44)
MAGNESIUM SERPL-MCNC: 2 MG/DL (ref 1.9–2.7)
MCH RBC QN AUTO: 27.8 PG (ref 26.8–34.3)
MCHC RBC AUTO-ENTMCNC: 30.8 G/DL (ref 31.4–37.4)
MCV RBC AUTO: 90 FL (ref 82–98)
MONOCYTES # BLD AUTO: 0.66 THOUSAND/ΜL (ref 0.17–1.22)
MONOCYTES NFR BLD AUTO: 11 % (ref 4–12)
NEUTROPHILS # BLD AUTO: 3.46 THOUSANDS/ΜL (ref 1.85–7.62)
NEUTS SEG NFR BLD AUTO: 59 % (ref 43–75)
NRBC BLD AUTO-RTO: 0 /100 WBCS
PLATELET # BLD AUTO: 215 THOUSANDS/UL (ref 149–390)
PMV BLD AUTO: 11.7 FL (ref 8.9–12.7)
POTASSIUM SERPL-SCNC: 4 MMOL/L (ref 3.5–5.3)
PROCALCITONIN SERPL-MCNC: 0.08 NG/ML
RBC # BLD AUTO: 4.43 MILLION/UL (ref 3.81–5.12)
SODIUM SERPL-SCNC: 134 MMOL/L (ref 135–147)
WBC # BLD AUTO: 5.89 THOUSAND/UL (ref 4.31–10.16)

## 2021-12-06 PROCEDURE — 84145 PROCALCITONIN (PCT): CPT | Performed by: INTERNAL MEDICINE

## 2021-12-06 PROCEDURE — 83735 ASSAY OF MAGNESIUM: CPT | Performed by: INTERNAL MEDICINE

## 2021-12-06 PROCEDURE — 82948 REAGENT STRIP/BLOOD GLUCOSE: CPT

## 2021-12-06 PROCEDURE — 80048 BASIC METABOLIC PNL TOTAL CA: CPT | Performed by: INTERNAL MEDICINE

## 2021-12-06 PROCEDURE — 97110 THERAPEUTIC EXERCISES: CPT

## 2021-12-06 PROCEDURE — 97535 SELF CARE MNGMENT TRAINING: CPT

## 2021-12-06 PROCEDURE — 99239 HOSP IP/OBS DSCHRG MGMT >30: CPT | Performed by: INTERNAL MEDICINE

## 2021-12-06 PROCEDURE — 94761 N-INVAS EAR/PLS OXIMETRY MLT: CPT

## 2021-12-06 PROCEDURE — 85025 COMPLETE CBC W/AUTO DIFF WBC: CPT | Performed by: INTERNAL MEDICINE

## 2021-12-06 RX ORDER — CEFDINIR 300 MG/1
300 CAPSULE ORAL EVERY 24 HOURS
Qty: 4 CAPSULE | Refills: 0 | Status: SHIPPED | OUTPATIENT
Start: 2021-12-06 | End: 2021-12-10

## 2021-12-06 RX ADMIN — LEVOTHYROXINE SODIUM 100 MCG: 100 TABLET ORAL at 06:31

## 2021-12-06 RX ADMIN — INSULIN LISPRO 2 UNITS: 100 INJECTION, SOLUTION INTRAVENOUS; SUBCUTANEOUS at 12:38

## 2021-12-06 RX ADMIN — INSULIN LISPRO 1 UNITS: 100 INJECTION, SOLUTION INTRAVENOUS; SUBCUTANEOUS at 10:12

## 2021-12-06 RX ADMIN — INSULIN ASPART 25 UNITS: 100 INJECTION, SUSPENSION SUBCUTANEOUS at 10:13

## 2021-12-06 RX ADMIN — PANTOPRAZOLE SODIUM 40 MG: 40 TABLET, DELAYED RELEASE ORAL at 10:09

## 2021-12-06 RX ADMIN — ENOXAPARIN SODIUM 30 MG: 100 INJECTION SUBCUTANEOUS at 10:09

## 2022-02-21 ENCOUNTER — HOSPITAL ENCOUNTER (OUTPATIENT)
Dept: CT IMAGING | Facility: HOSPITAL | Age: 87
Discharge: HOME | End: 2022-02-21
Payer: MEDICARE

## 2022-02-21 DIAGNOSIS — R09.02 HYPOXIA: ICD-10-CM

## 2022-02-21 DIAGNOSIS — J18.9 PNEUMONIA DUE TO INFECTIOUS ORGANISM, UNSPECIFIED LATERALITY, UNSPECIFIED PART OF LUNG: ICD-10-CM

## 2022-02-21 DIAGNOSIS — R06.02 SOB (SHORTNESS OF BREATH): ICD-10-CM

## 2022-02-21 PROCEDURE — 71250 CT THORAX DX C-: CPT

## 2022-02-21 PROCEDURE — G1004 CDSM NDSC: HCPCS

## 2022-04-18 ENCOUNTER — HOSPITAL ENCOUNTER (INPATIENT)
Facility: HOSPITAL | Age: 87
LOS: 2 days | Discharge: HOME HEALTH | DRG: 871 | End: 2022-04-21
Attending: EMERGENCY MEDICINE | Admitting: INTERNAL MEDICINE
Payer: MEDICARE

## 2022-04-18 ENCOUNTER — APPOINTMENT (EMERGENCY)
Dept: RADIOLOGY | Facility: HOSPITAL | Age: 87
DRG: 871 | End: 2022-04-18
Payer: MEDICARE

## 2022-04-18 DIAGNOSIS — E87.70 VOLUME OVERLOAD: ICD-10-CM

## 2022-04-18 DIAGNOSIS — K92.2 GI BLEED: ICD-10-CM

## 2022-04-18 DIAGNOSIS — K92.2 UPPER GI BLEED: ICD-10-CM

## 2022-04-18 DIAGNOSIS — D64.9 ANEMIA: ICD-10-CM

## 2022-04-18 DIAGNOSIS — N30.00 ACUTE CYSTITIS WITHOUT HEMATURIA: ICD-10-CM

## 2022-04-18 DIAGNOSIS — R77.8 ELEVATED TROPONIN I LEVEL: ICD-10-CM

## 2022-04-18 DIAGNOSIS — J18.9 PNEUMONIA: ICD-10-CM

## 2022-04-18 DIAGNOSIS — R73.9 HYPERGLYCEMIA: ICD-10-CM

## 2022-04-18 DIAGNOSIS — J18.9 PNEUMONIA OF RIGHT UPPER LOBE DUE TO INFECTIOUS ORGANISM: ICD-10-CM

## 2022-04-18 DIAGNOSIS — R09.02 HYPOXIA: Primary | ICD-10-CM

## 2022-04-18 LAB
ALBUMIN SERPL BCP-MCNC: 3.8 G/DL (ref 3.5–5)
ALP SERPL-CCNC: 50 U/L (ref 34–104)
ALT SERPL W P-5'-P-CCNC: 9 U/L (ref 7–52)
ANION GAP SERPL CALCULATED.3IONS-SCNC: 11 MMOL/L (ref 4–13)
APTT PPP: 30 SECONDS (ref 23–37)
AST SERPL W P-5'-P-CCNC: 15 U/L (ref 13–39)
BASOPHILS # BLD AUTO: 0.03 THOUSANDS/ΜL (ref 0–0.1)
BASOPHILS NFR BLD AUTO: 0 % (ref 0–1)
BILIRUB SERPL-MCNC: 0.58 MG/DL (ref 0.2–1)
BNP SERPL-MCNC: 197 PG/ML (ref 1–100)
BUN SERPL-MCNC: 42 MG/DL (ref 5–25)
CALCIUM SERPL-MCNC: 9.4 MG/DL (ref 8.4–10.2)
CARDIAC TROPONIN I PNL SERPL HS: 138 NG/L
CHLORIDE SERPL-SCNC: 97 MMOL/L (ref 96–108)
CO2 SERPL-SCNC: 23 MMOL/L (ref 21–32)
CREAT SERPL-MCNC: 1.29 MG/DL (ref 0.6–1.3)
EOSINOPHIL # BLD AUTO: 0.1 THOUSAND/ΜL (ref 0–0.61)
EOSINOPHIL NFR BLD AUTO: 1 % (ref 0–6)
ERYTHROCYTE [DISTWIDTH] IN BLOOD BY AUTOMATED COUNT: 17 % (ref 11.6–15.1)
GFR SERPL CREATININE-BSD FRML MDRD: 35 ML/MIN/1.73SQ M
GLUCOSE SERPL-MCNC: 295 MG/DL (ref 65–140)
HCT VFR BLD AUTO: 24.6 % (ref 34.8–46.1)
HGB BLD-MCNC: 7.6 G/DL (ref 11.5–15.4)
IMM GRANULOCYTES # BLD AUTO: 0.05 THOUSAND/UL (ref 0–0.2)
IMM GRANULOCYTES NFR BLD AUTO: 1 % (ref 0–2)
INR PPP: 1.09 (ref 0.84–1.19)
LACTATE SERPL-SCNC: 4.5 MMOL/L (ref 0.5–2)
LYMPHOCYTES # BLD AUTO: 1.75 THOUSANDS/ΜL (ref 0.6–4.47)
LYMPHOCYTES NFR BLD AUTO: 16 % (ref 14–44)
MAGNESIUM SERPL-MCNC: 1.6 MG/DL (ref 1.9–2.7)
MCH RBC QN AUTO: 29.6 PG (ref 26.8–34.3)
MCHC RBC AUTO-ENTMCNC: 30.9 G/DL (ref 31.4–37.4)
MCV RBC AUTO: 96 FL (ref 82–98)
MONOCYTES # BLD AUTO: 0.73 THOUSAND/ΜL (ref 0.17–1.22)
MONOCYTES NFR BLD AUTO: 7 % (ref 4–12)
NEUTROPHILS # BLD AUTO: 8.35 THOUSANDS/ΜL (ref 1.85–7.62)
NEUTS SEG NFR BLD AUTO: 75 % (ref 43–75)
NRBC BLD AUTO-RTO: 0 /100 WBCS
PLATELET # BLD AUTO: 286 THOUSANDS/UL (ref 149–390)
PMV BLD AUTO: 11.7 FL (ref 8.9–12.7)
POTASSIUM SERPL-SCNC: 5.1 MMOL/L (ref 3.5–5.3)
PROT SERPL-MCNC: 7 G/DL (ref 6.4–8.4)
PROTHROMBIN TIME: 14 SECONDS (ref 11.6–14.5)
RBC # BLD AUTO: 2.57 MILLION/UL (ref 3.81–5.12)
SODIUM SERPL-SCNC: 131 MMOL/L (ref 135–147)
WBC # BLD AUTO: 11.01 THOUSAND/UL (ref 4.31–10.16)

## 2022-04-18 PROCEDURE — 82272 OCCULT BLD FECES 1-3 TESTS: CPT

## 2022-04-18 PROCEDURE — 86850 RBC ANTIBODY SCREEN: CPT | Performed by: EMERGENCY MEDICINE

## 2022-04-18 PROCEDURE — 84145 PROCALCITONIN (PCT): CPT | Performed by: EMERGENCY MEDICINE

## 2022-04-18 PROCEDURE — 83550 IRON BINDING TEST: CPT | Performed by: PHYSICIAN ASSISTANT

## 2022-04-18 PROCEDURE — 82728 ASSAY OF FERRITIN: CPT | Performed by: PHYSICIAN ASSISTANT

## 2022-04-18 PROCEDURE — 83880 ASSAY OF NATRIURETIC PEPTIDE: CPT | Performed by: EMERGENCY MEDICINE

## 2022-04-18 PROCEDURE — 83540 ASSAY OF IRON: CPT | Performed by: PHYSICIAN ASSISTANT

## 2022-04-18 PROCEDURE — 96375 TX/PRO/DX INJ NEW DRUG ADDON: CPT

## 2022-04-18 PROCEDURE — 0241U HB NFCT DS VIR RESP RNA 4 TRGT: CPT | Performed by: EMERGENCY MEDICINE

## 2022-04-18 PROCEDURE — 86920 COMPATIBILITY TEST SPIN: CPT

## 2022-04-18 PROCEDURE — 87040 BLOOD CULTURE FOR BACTERIA: CPT | Performed by: EMERGENCY MEDICINE

## 2022-04-18 PROCEDURE — 99285 EMERGENCY DEPT VISIT HI MDM: CPT

## 2022-04-18 PROCEDURE — 85730 THROMBOPLASTIN TIME PARTIAL: CPT | Performed by: EMERGENCY MEDICINE

## 2022-04-18 PROCEDURE — 85025 COMPLETE CBC W/AUTO DIFF WBC: CPT | Performed by: EMERGENCY MEDICINE

## 2022-04-18 PROCEDURE — 84484 ASSAY OF TROPONIN QUANT: CPT | Performed by: EMERGENCY MEDICINE

## 2022-04-18 PROCEDURE — 85610 PROTHROMBIN TIME: CPT | Performed by: EMERGENCY MEDICINE

## 2022-04-18 PROCEDURE — 83605 ASSAY OF LACTIC ACID: CPT | Performed by: EMERGENCY MEDICINE

## 2022-04-18 PROCEDURE — 86901 BLOOD TYPING SEROLOGIC RH(D): CPT | Performed by: EMERGENCY MEDICINE

## 2022-04-18 PROCEDURE — 83735 ASSAY OF MAGNESIUM: CPT | Performed by: EMERGENCY MEDICINE

## 2022-04-18 PROCEDURE — 80053 COMPREHEN METABOLIC PANEL: CPT | Performed by: EMERGENCY MEDICINE

## 2022-04-18 PROCEDURE — 93005 ELECTROCARDIOGRAM TRACING: CPT

## 2022-04-18 PROCEDURE — 36415 COLL VENOUS BLD VENIPUNCTURE: CPT | Performed by: EMERGENCY MEDICINE

## 2022-04-18 PROCEDURE — 84443 ASSAY THYROID STIM HORMONE: CPT | Performed by: EMERGENCY MEDICINE

## 2022-04-18 PROCEDURE — 71045 X-RAY EXAM CHEST 1 VIEW: CPT

## 2022-04-18 PROCEDURE — 30233N1 TRANSFUSION OF NONAUTOLOGOUS RED BLOOD CELLS INTO PERIPHERAL VEIN, PERCUTANEOUS APPROACH: ICD-10-PCS | Performed by: INTERNAL MEDICINE

## 2022-04-18 PROCEDURE — 86900 BLOOD TYPING SEROLOGIC ABO: CPT | Performed by: EMERGENCY MEDICINE

## 2022-04-18 PROCEDURE — 99291 CRITICAL CARE FIRST HOUR: CPT | Performed by: EMERGENCY MEDICINE

## 2022-04-18 PROCEDURE — 36430 TRANSFUSION BLD/BLD COMPNT: CPT

## 2022-04-18 RX ORDER — SODIUM CHLORIDE 9 MG/ML
50 INJECTION, SOLUTION INTRAVENOUS CONTINUOUS
Status: DISCONTINUED | OUTPATIENT
Start: 2022-04-18 | End: 2022-04-19

## 2022-04-18 RX ORDER — IPRATROPIUM BROMIDE AND ALBUTEROL SULFATE .5; 3 MG/3ML; MG/3ML
1 SOLUTION RESPIRATORY (INHALATION) ONCE
Status: COMPLETED | OUTPATIENT
Start: 2022-04-18 | End: 2022-04-18

## 2022-04-18 RX ORDER — METHYLPREDNISOLONE SOD SUCC 125 MG
1 VIAL (EA) INJECTION ONCE
Status: COMPLETED | OUTPATIENT
Start: 2022-04-18 | End: 2022-04-18

## 2022-04-18 RX ORDER — CEFEPIME HYDROCHLORIDE 2 G/50ML
2000 INJECTION, SOLUTION INTRAVENOUS ONCE
Status: COMPLETED | OUTPATIENT
Start: 2022-04-18 | End: 2022-04-19

## 2022-04-18 RX ORDER — FUROSEMIDE 10 MG/ML
40 INJECTION INTRAMUSCULAR; INTRAVENOUS ONCE
Status: COMPLETED | OUTPATIENT
Start: 2022-04-18 | End: 2022-04-18

## 2022-04-18 RX ADMIN — FUROSEMIDE 40 MG: 10 INJECTION, SOLUTION INTRAMUSCULAR; INTRAVENOUS at 23:13

## 2022-04-19 ENCOUNTER — APPOINTMENT (INPATIENT)
Dept: NON INVASIVE DIAGNOSTICS | Facility: HOSPITAL | Age: 87
DRG: 871 | End: 2022-04-19
Payer: MEDICARE

## 2022-04-19 ENCOUNTER — APPOINTMENT (EMERGENCY)
Dept: CT IMAGING | Facility: HOSPITAL | Age: 87
DRG: 871 | End: 2022-04-19
Payer: MEDICARE

## 2022-04-19 PROBLEM — J18.9 PNEUMONIA OF RIGHT LOWER LOBE DUE TO INFECTIOUS ORGANISM: Status: RESOLVED | Noted: 2021-12-03 | Resolved: 2022-04-19

## 2022-04-19 PROBLEM — R77.8 ELEVATED TROPONIN I LEVEL: Status: ACTIVE | Noted: 2022-04-19

## 2022-04-19 PROBLEM — R65.20 SEVERE SEPSIS (HCC): Status: ACTIVE | Noted: 2022-04-19

## 2022-04-19 PROBLEM — N30.00 ACUTE CYSTITIS WITHOUT HEMATURIA: Status: ACTIVE | Noted: 2022-04-19

## 2022-04-19 PROBLEM — K92.2 UPPER GI BLEED: Status: ACTIVE | Noted: 2022-04-19

## 2022-04-19 PROBLEM — A41.9 SEVERE SEPSIS (HCC): Status: ACTIVE | Noted: 2022-04-19

## 2022-04-19 PROBLEM — E87.2 LACTIC ACIDOSIS: Status: ACTIVE | Noted: 2022-04-19

## 2022-04-19 PROBLEM — D64.9 ANEMIA: Status: ACTIVE | Noted: 2022-04-19

## 2022-04-19 PROBLEM — E87.20 LACTIC ACIDOSIS: Status: ACTIVE | Noted: 2022-04-19

## 2022-04-19 PROBLEM — R09.02 HYPOXIA: Status: ACTIVE | Noted: 2022-04-19

## 2022-04-19 PROBLEM — J18.9 RIGHT UPPER LOBE PNEUMONIA: Status: ACTIVE | Noted: 2022-04-19

## 2022-04-19 LAB
2HR DELTA HS TROPONIN: 246 NG/L
4HR DELTA HS TROPONIN: 512 NG/L
ABO GROUP BLD BPU: NORMAL
ABO GROUP BLD: NORMAL
ABO GROUP BLD: NORMAL
ANION GAP SERPL CALCULATED.3IONS-SCNC: 13 MMOL/L (ref 4–13)
ANION GAP SERPL CALCULATED.3IONS-SCNC: 15 MMOL/L (ref 4–13)
ANISOCYTOSIS BLD QL SMEAR: PRESENT
AORTIC ROOT: 2.5 CM
AORTIC VALVE MEAN VELOCITY: 24.3 M/S
APICAL FOUR CHAMBER EJECTION FRACTION: 62 %
ARTERIAL PATENCY WRIST A: NO
ASCENDING AORTA: 2.9 CM (ref 1.99–2.97)
ATRIAL RATE: 100 BPM
ATRIAL RATE: 95 BPM
ATRIAL RATE: 97 BPM
AV AREA BY CONTINUOUS VTI: 0.7 CM2
AV AREA PEAK VELOCITY: 0.8 CM2
AV LVOT MEAN GRADIENT: 2 MMHG
AV LVOT PEAK GRADIENT: 3 MMHG
AV MEAN GRADIENT: 27 MMHG
AV PEAK GRADIENT: 44 MMHG
AV VALVE AREA: 0.74 CM2
AV VELOCITY RATIO: 0.23
BACTERIA UR QL AUTO: ABNORMAL /HPF
BASE EX.OXY STD BLDV CALC-SCNC: 94 % (ref 60–80)
BASE EXCESS BLDV CALC-SCNC: -4.3 MMOL/L
BASOPHILS # BLD MANUAL: 0 THOUSAND/UL (ref 0–0.1)
BASOPHILS NFR MAR MANUAL: 0 % (ref 0–1)
BILIRUB UR QL STRIP: NEGATIVE
BLD GP AB SCN SERPL QL: NEGATIVE
BPU ID: NORMAL
BUN SERPL-MCNC: 46 MG/DL (ref 5–25)
BUN SERPL-MCNC: 47 MG/DL (ref 5–25)
CALCIUM SERPL-MCNC: 9.3 MG/DL (ref 8.4–10.2)
CALCIUM SERPL-MCNC: 9.3 MG/DL (ref 8.4–10.2)
CARDIAC TROPONIN I PNL SERPL HS: 384 NG/L
CARDIAC TROPONIN I PNL SERPL HS: 650 NG/L
CHLORIDE SERPL-SCNC: 97 MMOL/L (ref 96–108)
CHLORIDE SERPL-SCNC: 97 MMOL/L (ref 96–108)
CLARITY UR: CLEAR
CO2 SERPL-SCNC: 20 MMOL/L (ref 21–32)
CO2 SERPL-SCNC: 22 MMOL/L (ref 21–32)
COLOR UR: YELLOW
CREAT SERPL-MCNC: 1.34 MG/DL (ref 0.6–1.3)
CREAT SERPL-MCNC: 1.35 MG/DL (ref 0.6–1.3)
CROSSMATCH: NORMAL
DOP CALC AO PEAK VEL: 3.6 M/S
DOP CALC AO VTI: 69.17 CM
DOP CALC LVOT AREA: 3.14 CM2
DOP CALC LVOT DIAMETER: 2 CM
DOP CALC LVOT PEAK VEL VTI: 16.41 CM
DOP CALC LVOT PEAK VEL: 0.82 M/S
DOP CALC LVOT STROKE INDEX: 27.2 ML/M2
DOP CALC LVOT STROKE VOLUME: 51.53 CM3
DOP CALC MV VTI: 57.06 CM
EOSINOPHIL # BLD MANUAL: 0 THOUSAND/UL (ref 0–0.4)
EOSINOPHIL NFR BLD MANUAL: 0 % (ref 0–6)
ERYTHROCYTE [DISTWIDTH] IN BLOOD BY AUTOMATED COUNT: 16.1 % (ref 11.6–15.1)
FERRITIN SERPL-MCNC: 27 NG/ML (ref 8–388)
FLUAV RNA RESP QL NAA+PROBE: NEGATIVE
FLUBV RNA RESP QL NAA+PROBE: NEGATIVE
FRACTIONAL SHORTENING: 41 % (ref 28–44)
GFR SERPL CREATININE-BSD FRML MDRD: 33 ML/MIN/1.73SQ M
GFR SERPL CREATININE-BSD FRML MDRD: 34 ML/MIN/1.73SQ M
GLUCOSE SERPL-MCNC: 149 MG/DL (ref 65–140)
GLUCOSE SERPL-MCNC: 163 MG/DL (ref 65–140)
GLUCOSE SERPL-MCNC: 226 MG/DL (ref 65–140)
GLUCOSE SERPL-MCNC: 234 MG/DL (ref 65–140)
GLUCOSE SERPL-MCNC: 294 MG/DL (ref 65–140)
GLUCOSE SERPL-MCNC: 339 MG/DL (ref 65–140)
GLUCOSE SERPL-MCNC: 348 MG/DL (ref 65–140)
GLUCOSE SERPL-MCNC: 373 MG/DL (ref 65–140)
GLUCOSE SERPL-MCNC: 439 MG/DL (ref 65–140)
GLUCOSE SERPL-MCNC: 439 MG/DL (ref 65–140)
GLUCOSE SERPL-MCNC: 457 MG/DL (ref 65–140)
GLUCOSE SERPL-MCNC: >500 MG/DL (ref 65–140)
GLUCOSE UR STRIP-MCNC: ABNORMAL MG/DL
HCO3 BLDV-SCNC: 19.7 MMOL/L (ref 24–30)
HCT VFR BLD AUTO: 27.7 % (ref 34.8–46.1)
HGB BLD-MCNC: 8.1 G/DL (ref 11.5–15.4)
HGB BLD-MCNC: 8.2 G/DL (ref 11.5–15.4)
HGB BLD-MCNC: 8.6 G/DL (ref 11.5–15.4)
HGB BLD-MCNC: 9.3 G/DL (ref 11.5–15.4)
HGB UR QL STRIP.AUTO: NEGATIVE
INTERVENTRICULAR SEPTUM IN DIASTOLE (PARASTERNAL SHORT AXIS VIEW): 1.1 CM
INTERVENTRICULAR SEPTUM: 1.1 CM (ref 0.52–0.98)
IRON SATN MFR SERPL: 10 % (ref 15–50)
IRON SERPL-MCNC: 34 UG/DL (ref 50–170)
KETONES UR STRIP-MCNC: NEGATIVE MG/DL
L PNEUMO1 AG UR QL IA.RAPID: NEGATIVE
LAAS-AP4: 19.3 CM2
LACTATE SERPL-SCNC: 4.1 MMOL/L (ref 0.5–2)
LEFT ATRIUM SIZE: 4.2 CM
LEFT INTERNAL DIMENSION IN SYSTOLE: 2.6 CM (ref 2.71–4.11)
LEFT VENTRICULAR INTERNAL DIMENSION IN DIASTOLE: 4.4 CM (ref 4.44–6.61)
LEFT VENTRICULAR POSTERIOR WALL IN END DIASTOLE: 0.9 CM (ref 0.51–0.97)
LEFT VENTRICULAR STROKE VOLUME: 65 ML
LEUKOCYTE ESTERASE UR QL STRIP: NEGATIVE
LG PLATELETS BLD QL SMEAR: PRESENT
LVSV (TEICH): 65 ML
LYMPHOCYTES # BLD AUTO: 0.4 THOUSAND/UL (ref 0.6–4.47)
LYMPHOCYTES # BLD AUTO: 4 % (ref 14–44)
MAGNESIUM SERPL-MCNC: 1.7 MG/DL (ref 1.9–2.7)
MCH RBC QN AUTO: 29.4 PG (ref 26.8–34.3)
MCHC RBC AUTO-ENTMCNC: 31 G/DL (ref 31.4–37.4)
MCV RBC AUTO: 95 FL (ref 82–98)
MONOCYTES # BLD AUTO: 0.1 THOUSAND/UL (ref 0–1.22)
MONOCYTES NFR BLD: 1 % (ref 4–12)
MV MEAN GRADIENT: 12 MMHG
MV PEAK GRADIENT: 30 MMHG
MV VALVE AREA BY CONTINUITY EQUATION: 0.9 CM2
NASAL CANNULA: 3
NEUTROPHILS # BLD MANUAL: 9.57 THOUSAND/UL (ref 1.85–7.62)
NEUTS BAND NFR BLD MANUAL: 2 % (ref 0–8)
NEUTS SEG NFR BLD AUTO: 93 % (ref 43–75)
NITRITE UR QL STRIP: POSITIVE
NON-SQ EPI CELLS URNS QL MICRO: ABNORMAL /HPF
O2 CT BLDV-SCNC: 12.2 ML/DL
P AXIS: 49 DEGREES
P AXIS: 64 DEGREES
P AXIS: 64 DEGREES
PCO2 BLDV: 31.6 MM HG (ref 42–50)
PH BLDV: 7.41 [PH] (ref 7.3–7.4)
PH UR STRIP.AUTO: 5 [PH]
PHOSPHATE SERPL-MCNC: 3 MG/DL (ref 2.3–4.1)
PLATELET # BLD AUTO: 240 THOUSANDS/UL (ref 149–390)
PLATELET BLD QL SMEAR: ADEQUATE
PMV BLD AUTO: 11.3 FL (ref 8.9–12.7)
PO2 BLDV: 169.4 MM HG (ref 35–45)
POTASSIUM SERPL-SCNC: 4.2 MMOL/L (ref 3.5–5.3)
POTASSIUM SERPL-SCNC: 5.4 MMOL/L (ref 3.5–5.3)
PR INTERVAL: 154 MS
PR INTERVAL: 170 MS
PR INTERVAL: 174 MS
PROCALCITONIN SERPL-MCNC: 0.1 NG/ML
PROT UR STRIP-MCNC: NEGATIVE MG/DL
QRS AXIS: -78 DEGREES
QRS AXIS: 267 DEGREES
QRS AXIS: 268 DEGREES
QRSD INTERVAL: 132 MS
QRSD INTERVAL: 134 MS
QRSD INTERVAL: 136 MS
QT INTERVAL: 408 MS
QT INTERVAL: 408 MS
QT INTERVAL: 412 MS
QTC INTERVAL: 512 MS
QTC INTERVAL: 518 MS
QTC INTERVAL: 531 MS
RBC # BLD AUTO: 2.93 MILLION/UL (ref 3.81–5.12)
RBC #/AREA URNS AUTO: ABNORMAL /HPF
RBC MORPH BLD: PRESENT
RH BLD: POSITIVE
RH BLD: POSITIVE
RIGHT VENTRICLE ID DIMENSION: 2.8 CM
RSV RNA RESP QL NAA+PROBE: NEGATIVE
S PNEUM AG UR QL: NEGATIVE
SARS-COV-2 RNA RESP QL NAA+PROBE: NEGATIVE
SL CV LV EF: 55
SL CV PED ECHO LEFT VENTRICLE DIASTOLIC VOLUME (MOD BIPLANE) 2D: 90 ML
SL CV PED ECHO LEFT VENTRICLE SYSTOLIC VOLUME (MOD BIPLANE) 2D: 25 ML
SODIUM SERPL-SCNC: 132 MMOL/L (ref 135–147)
SODIUM SERPL-SCNC: 132 MMOL/L (ref 135–147)
SP GR UR STRIP.AUTO: 1.02 (ref 1–1.03)
SPECIMEN EXPIRATION DATE: NORMAL
T WAVE AXIS: 28 DEGREES
T WAVE AXIS: 55 DEGREES
T WAVE AXIS: 68 DEGREES
TIBC SERPL-MCNC: 356 UG/DL (ref 250–450)
TR MAX PG: 50 MMHG
TR PEAK VELOCITY: 3.5 M/S
TRICUSPID VALVE PEAK REGURGITATION VELOCITY: 3.54 M/S
TSH SERPL DL<=0.05 MIU/L-ACNC: 4.88 UIU/ML (ref 0.45–4.5)
UNIT DISPENSE STATUS: NORMAL
UNIT PRODUCT CODE: NORMAL
UNIT PRODUCT VOLUME: 350 ML
UNIT RH: NORMAL
UROBILINOGEN UR QL STRIP.AUTO: 0.2 E.U./DL
VENTRICULAR RATE: 100 BPM
VENTRICULAR RATE: 95 BPM
VENTRICULAR RATE: 97 BPM
WBC # BLD AUTO: 10.07 THOUSAND/UL (ref 4.31–10.16)
WBC #/AREA URNS AUTO: ABNORMAL /HPF
Z-SCORE OF ASCENDING AORTA: 1.68
Z-SCORE OF INTERVENTRICULAR SEPTUM IN END DIASTOLE: 2.97
Z-SCORE OF LEFT VENTRICULAR DIMENSION IN END DIASTOLE: -2.08
Z-SCORE OF LEFT VENTRICULAR DIMENSION IN END SYSTOLE: -1.97
Z-SCORE OF LEFT VENTRICULAR POSTERIOR WALL IN END DIASTOLE: 1.37

## 2022-04-19 PROCEDURE — 99222 1ST HOSP IP/OBS MODERATE 55: CPT | Performed by: INTERNAL MEDICINE

## 2022-04-19 PROCEDURE — P9016 RBC LEUKOCYTES REDUCED: HCPCS

## 2022-04-19 PROCEDURE — 85007 BL SMEAR W/DIFF WBC COUNT: CPT | Performed by: PHYSICIAN ASSISTANT

## 2022-04-19 PROCEDURE — 93005 ELECTROCARDIOGRAM TRACING: CPT

## 2022-04-19 PROCEDURE — 87081 CULTURE SCREEN ONLY: CPT | Performed by: PHYSICIAN ASSISTANT

## 2022-04-19 PROCEDURE — 87449 NOS EACH ORGANISM AG IA: CPT | Performed by: PHYSICIAN ASSISTANT

## 2022-04-19 PROCEDURE — 85018 HEMOGLOBIN: CPT | Performed by: PHYSICIAN ASSISTANT

## 2022-04-19 PROCEDURE — 84100 ASSAY OF PHOSPHORUS: CPT | Performed by: INTERNAL MEDICINE

## 2022-04-19 PROCEDURE — 96365 THER/PROPH/DIAG IV INF INIT: CPT

## 2022-04-19 PROCEDURE — 93010 ELECTROCARDIOGRAM REPORT: CPT | Performed by: INTERNAL MEDICINE

## 2022-04-19 PROCEDURE — 83605 ASSAY OF LACTIC ACID: CPT | Performed by: EMERGENCY MEDICINE

## 2022-04-19 PROCEDURE — 99222 1ST HOSP IP/OBS MODERATE 55: CPT | Performed by: NURSE PRACTITIONER

## 2022-04-19 PROCEDURE — 71250 CT THORAX DX C-: CPT

## 2022-04-19 PROCEDURE — 74176 CT ABD & PELVIS W/O CONTRAST: CPT

## 2022-04-19 PROCEDURE — 93306 TTE W/DOPPLER COMPLETE: CPT

## 2022-04-19 PROCEDURE — 80048 BASIC METABOLIC PNL TOTAL CA: CPT | Performed by: PHYSICIAN ASSISTANT

## 2022-04-19 PROCEDURE — 93306 TTE W/DOPPLER COMPLETE: CPT | Performed by: INTERNAL MEDICINE

## 2022-04-19 PROCEDURE — 36415 COLL VENOUS BLD VENIPUNCTURE: CPT | Performed by: EMERGENCY MEDICINE

## 2022-04-19 PROCEDURE — G1004 CDSM NDSC: HCPCS

## 2022-04-19 PROCEDURE — C9113 INJ PANTOPRAZOLE SODIUM, VIA: HCPCS | Performed by: PHYSICIAN ASSISTANT

## 2022-04-19 PROCEDURE — 84484 ASSAY OF TROPONIN QUANT: CPT | Performed by: EMERGENCY MEDICINE

## 2022-04-19 PROCEDURE — 80048 BASIC METABOLIC PNL TOTAL CA: CPT | Performed by: INTERNAL MEDICINE

## 2022-04-19 PROCEDURE — 83735 ASSAY OF MAGNESIUM: CPT | Performed by: INTERNAL MEDICINE

## 2022-04-19 PROCEDURE — 82948 REAGENT STRIP/BLOOD GLUCOSE: CPT

## 2022-04-19 PROCEDURE — 81001 URINALYSIS AUTO W/SCOPE: CPT | Performed by: EMERGENCY MEDICINE

## 2022-04-19 PROCEDURE — 82805 BLOOD GASES W/O2 SATURATION: CPT | Performed by: INTERNAL MEDICINE

## 2022-04-19 PROCEDURE — 99223 1ST HOSP IP/OBS HIGH 75: CPT | Performed by: INTERNAL MEDICINE

## 2022-04-19 PROCEDURE — 85027 COMPLETE CBC AUTOMATED: CPT | Performed by: PHYSICIAN ASSISTANT

## 2022-04-19 RX ORDER — LEVOTHYROXINE SODIUM 0.1 MG/1
100 TABLET ORAL DAILY
Status: DISCONTINUED | OUTPATIENT
Start: 2022-04-19 | End: 2022-04-19

## 2022-04-19 RX ORDER — GUAIFENESIN/DEXTROMETHORPHAN 100-10MG/5
10 SYRUP ORAL EVERY 4 HOURS PRN
Status: DISCONTINUED | OUTPATIENT
Start: 2022-04-19 | End: 2022-04-21 | Stop reason: HOSPADM

## 2022-04-19 RX ORDER — POTASSIUM CHLORIDE 750 MG/1
10 TABLET, EXTENDED RELEASE ORAL DAILY
Status: DISCONTINUED | OUTPATIENT
Start: 2022-04-19 | End: 2022-04-21 | Stop reason: HOSPADM

## 2022-04-19 RX ORDER — PRAVASTATIN SODIUM 40 MG
40 TABLET ORAL DAILY
Status: DISCONTINUED | OUTPATIENT
Start: 2022-04-19 | End: 2022-04-21 | Stop reason: HOSPADM

## 2022-04-19 RX ORDER — SODIUM CHLORIDE, SODIUM GLUCONATE, SODIUM ACETATE, POTASSIUM CHLORIDE, MAGNESIUM CHLORIDE, SODIUM PHOSPHATE, DIBASIC, AND POTASSIUM PHOSPHATE .53; .5; .37; .037; .03; .012; .00082 G/100ML; G/100ML; G/100ML; G/100ML; G/100ML; G/100ML; G/100ML
75 INJECTION, SOLUTION INTRAVENOUS CONTINUOUS
Status: DISCONTINUED | OUTPATIENT
Start: 2022-04-19 | End: 2022-04-20

## 2022-04-19 RX ORDER — LOSARTAN POTASSIUM 50 MG/1
50 TABLET ORAL DAILY
Status: DISCONTINUED | OUTPATIENT
Start: 2022-04-19 | End: 2022-04-19

## 2022-04-19 RX ORDER — ONDANSETRON 2 MG/ML
4 INJECTION INTRAMUSCULAR; INTRAVENOUS EVERY 6 HOURS PRN
Status: DISCONTINUED | OUTPATIENT
Start: 2022-04-19 | End: 2022-04-21 | Stop reason: HOSPADM

## 2022-04-19 RX ORDER — ACETAMINOPHEN 325 MG/1
650 TABLET ORAL EVERY 6 HOURS PRN
Status: DISCONTINUED | OUTPATIENT
Start: 2022-04-19 | End: 2022-04-21 | Stop reason: HOSPADM

## 2022-04-19 RX ORDER — PANTOPRAZOLE SODIUM 40 MG/1
40 TABLET, DELAYED RELEASE ORAL
Status: DISCONTINUED | OUTPATIENT
Start: 2022-04-19 | End: 2022-04-21 | Stop reason: HOSPADM

## 2022-04-19 RX ORDER — AMLODIPINE BESYLATE 5 MG/1
10 TABLET ORAL DAILY
Status: DISCONTINUED | OUTPATIENT
Start: 2022-04-19 | End: 2022-04-19

## 2022-04-19 RX ORDER — SODIUM CHLORIDE, SODIUM GLUCONATE, SODIUM ACETATE, POTASSIUM CHLORIDE, MAGNESIUM CHLORIDE, SODIUM PHOSPHATE, DIBASIC, AND POTASSIUM PHOSPHATE .53; .5; .37; .037; .03; .012; .00082 G/100ML; G/100ML; G/100ML; G/100ML; G/100ML; G/100ML; G/100ML
100 INJECTION, SOLUTION INTRAVENOUS CONTINUOUS
Status: DISCONTINUED | OUTPATIENT
Start: 2022-04-19 | End: 2022-04-19

## 2022-04-19 RX ORDER — INSULIN ASPART 100 [IU]/ML
30 INJECTION, SUSPENSION SUBCUTANEOUS
Status: DISCONTINUED | OUTPATIENT
Start: 2022-04-20 | End: 2022-04-21 | Stop reason: HOSPADM

## 2022-04-19 RX ORDER — CEFTRIAXONE 1 G/50ML
1000 INJECTION, SOLUTION INTRAVENOUS EVERY 24 HOURS
Status: DISCONTINUED | OUTPATIENT
Start: 2022-04-19 | End: 2022-04-21 | Stop reason: HOSPADM

## 2022-04-19 RX ADMIN — INSULIN LISPRO 6 UNITS: 100 INJECTION, SOLUTION INTRAVENOUS; SUBCUTANEOUS at 07:42

## 2022-04-19 RX ADMIN — Medication 80 MG: at 03:59

## 2022-04-19 RX ADMIN — SODIUM CHLORIDE 10 UNITS/HR: 9 INJECTION, SOLUTION INTRAVENOUS at 11:23

## 2022-04-19 RX ADMIN — PANTOPRAZOLE SODIUM 40 MG: 40 TABLET, DELAYED RELEASE ORAL at 13:31

## 2022-04-19 RX ADMIN — SODIUM CHLORIDE 50 ML/HR: 0.9 INJECTION, SOLUTION INTRAVENOUS at 00:07

## 2022-04-19 RX ADMIN — LEVOTHYROXINE SODIUM 125 MCG: 25 TABLET ORAL at 09:01

## 2022-04-19 RX ADMIN — SODIUM CHLORIDE, SODIUM GLUCONATE, SODIUM ACETATE, POTASSIUM CHLORIDE, MAGNESIUM CHLORIDE, SODIUM PHOSPHATE, DIBASIC, AND POTASSIUM PHOSPHATE 75 ML/HR: .53; .5; .37; .037; .03; .012; .00082 INJECTION, SOLUTION INTRAVENOUS at 15:58

## 2022-04-19 RX ADMIN — CEFEPIME HYDROCHLORIDE 2000 MG: 2 INJECTION, SOLUTION INTRAVENOUS at 00:07

## 2022-04-19 RX ADMIN — POTASSIUM CHLORIDE 10 MEQ: 750 TABLET, EXTENDED RELEASE ORAL at 09:01

## 2022-04-19 RX ADMIN — SODIUM CHLORIDE, SODIUM GLUCONATE, SODIUM ACETATE, POTASSIUM CHLORIDE, MAGNESIUM CHLORIDE, SODIUM PHOSPHATE, DIBASIC, AND POTASSIUM PHOSPHATE 100 ML/HR: .53; .5; .37; .037; .03; .012; .00082 INJECTION, SOLUTION INTRAVENOUS at 06:43

## 2022-04-19 RX ADMIN — VANCOMYCIN HYDROCHLORIDE 750 MG: 750 INJECTION, SOLUTION INTRAVENOUS at 05:05

## 2022-04-19 RX ADMIN — Medication 8 MG/HR: at 04:38

## 2022-04-19 RX ADMIN — LOSARTAN POTASSIUM 50 MG: 50 TABLET, FILM COATED ORAL at 09:01

## 2022-04-19 RX ADMIN — INSULIN LISPRO 3 UNITS: 100 INJECTION, SOLUTION INTRAVENOUS; SUBCUTANEOUS at 22:02

## 2022-04-19 RX ADMIN — CEFTRIAXONE 1000 MG: 1 INJECTION, SOLUTION INTRAVENOUS at 06:43

## 2022-04-19 RX ADMIN — PRAVASTATIN SODIUM 40 MG: 20 TABLET ORAL at 09:01

## 2022-04-19 NOTE — ASSESSMENT & PLAN NOTE
Non-MI trop elevation vs Type 2 MI in the setting of sepsis, anemia  Without evidence of cardiac chest pain  Check echo

## 2022-04-19 NOTE — ASSESSMENT & PLAN NOTE
· Patient has a history of bleeding ulcer  · Will place on clear liquid diet  · Obtain H&H q 8 hours  · Heme check stools  · Place on Protonix drip  · Consult GI

## 2022-04-19 NOTE — PLAN OF CARE
Problem: Potential for Falls  Goal: Patient will remain free of falls  Description: INTERVENTIONS:  - Educate patient/family on patient safety including physical limitations  - Instruct patient to call for assistance with activity   - Consult OT/PT to assist with strengthening/mobility   - Keep Call bell within reach  - Keep bed low and locked with side rails adjusted as appropriate  - Keep care items and personal belongings within reach  - Initiate and maintain comfort rounds  - Make Fall Risk Sign visible to staff  - Offer Toileting every  Hours, in advance of need  - Initiate/Maintain alarm  - Obtain necessary fall risk management equipment:   - Apply yellow socks and bracelet for high fall risk patients  - Consider moving patient to room near nurses station  Outcome: Progressing     Problem: GASTROINTESTINAL - ADULT  Goal: Minimal or absence of nausea and/or vomiting  Description: INTERVENTIONS:  - Administer IV fluids if ordered to ensure adequate hydration  - Maintain NPO status until nausea and vomiting are resolved  - Nasogastric tube if ordered  - Administer ordered antiemetic medications as needed  - Provide nonpharmacologic comfort measures as appropriate  - Advance diet as tolerated, if ordered  - Consider nutrition services referral to assist patient with adequate nutrition and appropriate food choices  Outcome: Progressing     Problem: SKIN/TISSUE INTEGRITY - ADULT  Goal: Skin Integrity remains intact(Skin Breakdown Prevention)  Description: Assess:  -Perform Tonny assessment every   -Clean and moisturize skin every   -Inspect skin when repositioning, toileting, and assisting with ADLS  -Assess under medical devices such as  every   -Assess extremities for adequate circulation and sensation     Bed Management:  -Have minimal linens on bed & keep smooth, unwrinkled  -Change linens as needed when moist or perspiring  -Avoid sitting or lying in one position for more than  hours while in bed  -Keep [facility] at degrees     Toileting:  -Offer bedside commode  -Assess for incontinence every   -Use incontinent care products after each incontinent episode such as     Activity:  -Mobilize patient  times a day  -Encourage activity and walks on unit  -Encourage or provide ROM exercises   -Turn and reposition patient every  Hours  -Use appropriate equipment to lift or move patient in bed  -Instruct/ Assist with weight shifting every  when out of bed in chair  -Consider limitation of chair time  hour intervals    Skin Care:  -Avoid use of baby powder, tape, friction and shearing, hot water or constrictive clothing  -Relieve pressure over bony prominences using   -Do not massage red bony areas    Next Steps:  -Teach patient strategies to minimize risks such as    -Consider consults to  interdisciplinary teams such as   Outcome: Progressing     Problem: HEMATOLOGIC - ADULT  Goal: Maintains hematologic stability  Description: INTERVENTIONS  - Assess for signs and symptoms of bleeding or hemorrhage  - Monitor labs  - Administer supportive blood products/factors as ordered and appropriate  Outcome: Progressing

## 2022-04-19 NOTE — CONSULTS
Consultation - [facility]  Bobby Nieto 80 y o  female MRN: [id number]  Unit/Bed#: ED 24 Encounter: [id number]        Inpatient consult to gastroenterology  Consult performed by: Danyelle Ruff PA-C  Consult ordered by: Ermias Valdovinos PA-C          Reason for Consult / Principal Problem:     Upper GI bleed      ASSESSMENT AND PLAN:      Patient is a 27-year-old female with PMH significant for BCC, HLD, HTN, hypothyroidism, insulin-dependent DM 2, and history of gastric ulcers who [facility] from [facility] to [facility] ED on 04/19 for SOB x1 day  Patient was found to be anemic with hgb of 7 6 down from 12 3 four months prior and heme-positive stools, without evidence of overt GI bleeding  Patient also met sepsis criteria, with source presumably being a right upper lobe PNA on O2 via NC  In addition, patient with UTI, elevated troponin, and elevated lactic acid  CXR with bilateral pulmonary opacities suggesting multilobar pneumonia; underlying chronic interstitial lung disease  CT C/A/P notable for increased right upper lobe ground-glass airspace disease which may represent pneumonia; stable findings of interstitial lung disease compatible with probable UIP versus NSIP; superior endplate compression fracture of T12 vertebral body with minimal loss of vertebral body height  Labs notable for resolved leukocytosis (11 01- 10 07), improved hgb (7 6- 8 6) s/p 1 unit PRBCS (4/19), chronic hyponatremia (132), mild hyperkalemia (5 4), elevated but overall stable Cr (1 34), hyperglycemia (439), elevated troponin (650), improving lactic acid (4 5- 4 1)  Urine notable for UA with negative urine Legionella and strep pneumo  Negative COVID/flu/RSV  Blood cultures x2 collected and pending  Patient remains hemodynamically stable and afebrile  Patient with newly found anemia requiring transfusion, without evidence of overt GI bleeding   Given history most suspicious for slow bleeding gastritis vs PUD, but differential for upper and lower GI bleed also includes esophagitis, gastritis, PUD, AVMs, bleeding polyps, colitis, or malignancy  Given patient is currently being treated for PNA with increased O2 requirements and elevated troponin, she is at high risk for adverse outcomes associated with anesthesia required for endoscopic evaluation  Recommend optimizing patient from a cardiopulmonary standpoint prior to endoscopic evaluation (EGD/colon), and if patient remains stable discussion can be deferred to outpatient setting  Continue supportive care with PPI, monitoring hgb with transfusions as needed, and monitoring stools for overt signs of GI bleeding  If patient develops persistent anemia requiring transfusion or overt GI bleeding, can reconsider inpatient endoscopic evaluation   - Continue IV PPI twice daily  - Monitor hgb: Transfuse hgb <7 0   - Monitor stools for signs of overt GI bleeding  - Defer endoscopic evaluation at this time    GI will continue to follow  ______________________________________________________________________    HPI: Patient is a 80 y o  female with PMH significant for BCC, HLD, HTN, hypothyroidism, insulin-dependent DM2, and hx of gastric ulcers who presented from [facility] to [facility] ED on 4/19 for shortness of breath x1 day  During evaluation in the ED, patient was found to be anemic with hgb 7 6 down from 12 3 four months prior and heme-positive stools, without evidence of overt GI bleeding  Patient also met sepsis criteria including tachycardia, tachypnea, and lactic acidosis, with source presumably being a right upper lobe PNA with increased oxygen requiriemetns  In addition, patient with UA notable for UTI, elevated troponin, and elevated lactic acid  Patient denies overt GI bleeding including hematemesis/coffee-ground emesis, hematochezia, or melena  Patient does endorse a history of "bleeding ulcers" ? years prior - Currently takes pantoprazole 40 mg once daily, but does not follow with GI  Admits to previous upper endoscopy and colonoscopy, many years ago  Documentation and pathology unavailable for review  Denies personal or family hx of colon CA  Denies heartburn, nausea/vomiting, abdominal pain, constipation, diarrhea, or noticing blood in her stool/black tarry stools  Denies fatigues, chest pain, or lightheaded/dizziness  Denies fever/chills, night sweats, or unintentional weight loss  REVIEW OF SYSTEMS:    CONSTITUTIONAL: Denies any fever, chills, rigors, and weight loss  HEENT: No earache or tinnitus  Denies hearing loss or visual disturbances  CARDIOVASCULAR: No chest pain or palpitations  RESPIRATORY: Denies any cough, hemoptysis, shortness of breath or dyspnea on exertion  GASTROINTESTINAL: As noted in the History of Present Illness  GENITOURINARY: No problems with urination  Denies any hematuria or dysuria  NEUROLOGIC: No dizziness or vertigo, denies headaches  MUSCULOSKELETAL: Denies any muscle or joint pain  SKIN: Denies skin rashes or itching  ENDOCRINE: Denies excessive thirst  Denies intolerance to heat or cold  PSYCHOSOCIAL: Denies depression or anxiety  Denies any recent memory loss         Historical Information   Past Medical History:   Diagnosis Date    Anemia     Basal cell carcinoma of skin of face     Bleeding ulcer     Hypercholesterolemia     Hypertension     Hypothyroidism     Insulin dependent diabetes mellitus      Past Surgical History:   Procedure Laterality Date    CATARACT EXTRACTION Bilateral     MOHS SURGERY      REPLACEMENT TOTAL KNEE Right     SINUS SURGERY      TONSILLECTOMY      WISDOM TOOTH EXTRACTION       Social History   Social History     Substance and Sexual Activity   Alcohol Use Never     Social History     Substance and Sexual Activity   Drug Use No     Social History     Tobacco Use   Smoking Status Never Smoker   Smokeless Tobacco Never Used     Family History   Problem Relation Age of Onset    Lung cancer Sister     Diabetes Mother     Cirrhosis Father        Meds/Allergies     (Not in a hospital admission)    Current Facility-Administered Medications   Medication Dose Route Frequency    acetaminophen (TYLENOL) tablet 650 mg  650 mg Oral Q6H PRN    amLODIPine (NORVASC) tablet 10 mg  10 mg Oral Daily    cefTRIAXone (ROCEPHIN) IVPB (premix in dextrose) 1,000 mg 50 mL  1,000 mg Intravenous Q24H    dextromethorphan-guaiFENesin (ROBITUSSIN DM) oral syrup 10 mL  10 mL Oral Q4H PRN    insulin lispro (HumaLOG) 100 units/mL subcutaneous injection 1-6 Units  1-6 Units Subcutaneous TID AC    insulin lispro (HumaLOG) 100 units/mL subcutaneous injection 1-6 Units  1-6 Units Subcutaneous HS    levothyroxine tablet 125 mcg  125 mcg Oral Daily    losartan (COZAAR) tablet 50 mg  50 mg Oral Daily    multi-electrolyte (PLASMALYTE-A/ISOLYTE-S PH 7 4) IV solution  100 mL/hr Intravenous Continuous    ondansetron (ZOFRAN) injection 4 mg  4 mg Intravenous Q6H PRN    pantoprazole (PROTONIX) 80 mg in sodium chloride 0 9 % 100 mL infusion  8 mg/hr Intravenous Continuous    potassium chloride (K-DUR,KLOR-CON) CR tablet 10 mEq  10 mEq Oral Daily    pravastatin (PRAVACHOL) tablet 40 mg  40 mg Oral Daily    sodium chloride 0 9 % infusion  50 mL/hr Intravenous Continuous       Allergies   Allergen Reactions    Iv Dye [Iodinated Diagnostic Agents]     Percocet [Oxycodone-Acetaminophen]     Phenobarbital     Statins            Objective     Blood pressure 112/56, pulse 97, temperature 98 1 °F (36 7 °C), resp  rate 19, height 5' 3" (1 6 m), weight 81 kg (178 lb 9 2 oz), SpO2 95 %  Body mass index is 31 63 kg/m²  Intake/Output Summary (Last 24 hours) at 4/19/2022 0873  Last data filed at 4/19/2022 0643  Gross per 24 hour   Intake 1250 ml   Output --   Net 1250 ml         PHYSICAL EXAM:      General Appearance:   Alert, cooperative, no distress   HEENT:   Normocephalic, atraumatic, anicteric       Neck:  Supple, symmetrical, trachea midline   Lungs:   Clear to auscultation bilaterally; no rales, rhonchi or wheezing; respirations unlabored    Heart[de-identified]   Regular rate and rhythm; no murmur, rub, or gallop     Abdomen:   Soft, non-tender, non-distended; normal bowel sounds; no masses, no organomegaly    Genitalia:   Deferred    Rectal:   Deferred    Extremities:  No cyanosis, clubbing or edema    Pulses:  2+ and symmetric all extremities    Skin:  No jaundice, rashes, or lesions    Lymph nodes:  No palpable cervical lymphadenopathy        Lab Results:   Admission on 04/18/2022   Component Date Value    Protime 04/18/2022 14 0     INR 04/18/2022 1 09     PTT 04/18/2022 30     WBC 04/18/2022 11 01*    RBC 04/18/2022 2 57*    Hemoglobin 04/18/2022 7 6*    Hematocrit 04/18/2022 24 6*    MCV 04/18/2022 96     MCH 04/18/2022 29 6     MCHC 04/18/2022 30 9*    RDW 04/18/2022 17 0*    MPV 04/18/2022 11 7     Platelets 87/55/6515 286     nRBC 04/18/2022 0     Neutrophils Relative 04/18/2022 75     Immat GRANS % 04/18/2022 1     Lymphocytes Relative 04/18/2022 16     Monocytes Relative 04/18/2022 7     Eosinophils Relative 04/18/2022 1     Basophils Relative 04/18/2022 0     Neutrophils Absolute 04/18/2022 8 35*    Immature Grans Absolute 04/18/2022 0 05     Lymphocytes Absolute 04/18/2022 1 75     Monocytes Absolute 04/18/2022 0 73     Eosinophils Absolute 04/18/2022 0 10     Basophils Absolute 04/18/2022 0 03     Sodium 04/18/2022 131*    Potassium 04/18/2022 5 1     Chloride 04/18/2022 97     CO2 04/18/2022 23     ANION GAP 04/18/2022 11     BUN 04/18/2022 42*    Creatinine 04/18/2022 1 29     Glucose 04/18/2022 295*    Calcium 04/18/2022 9 4     AST 04/18/2022 15     ALT 04/18/2022 9     Alkaline Phosphatase 04/18/2022 50     Total Protein 04/18/2022 7 0     Albumin 04/18/2022 3 8     Total Bilirubin 04/18/2022 0 58     eGFR 04/18/2022 35     TSH 3RD GENERATON 04/18/2022 4 875*    Magnesium 04/18/2022 1 6*  LACTIC ACID 04/18/2022 4 5*    hs TnI 0hr 04/18/2022 138*    BNP 04/18/2022 197*    Color, UA 04/19/2022 Yellow     Clarity, UA 04/19/2022 Clear     Specific Gravity, UA 04/19/2022 1 025     pH, UA 04/19/2022 5 0     Leukocytes, UA 04/19/2022 Negative     Nitrite, UA 04/19/2022 Positive*    Protein, UA 04/19/2022 Negative     Glucose, UA 04/19/2022 Trace*    Ketones, UA 04/19/2022 Negative     Urobilinogen, UA 04/19/2022 0 2     Bilirubin, UA 04/19/2022 Negative     Blood, UA 04/19/2022 Negative     SARS-CoV-2 04/18/2022 Negative     INFLUENZA A PCR 04/18/2022 Negative     INFLUENZA B PCR 04/18/2022 Negative     RSV PCR 04/18/2022 Negative     ABO Grouping 04/18/2022 O     Rh Factor 04/18/2022 Positive     ABO Grouping 04/18/2022 O     Rh Factor 04/18/2022 Positive     Antibody Screen 04/18/2022 Negative     Specimen Expiration Date 04/18/2022 [id number]     LACTIC ACID 04/19/2022 4 1*    hs TnI 2hr 04/19/2022 384*    Delta 2hr hsTnI 04/19/2022 246*    Procalcitonin 04/18/2022 0 10     Unit Product Code 04/19/2022 R1678L31     Unit Number 04/19/2022 [id number]     Unit ABO 04/19/2022 O     Unit RH 04/19/2022 POS     Crossmatch 04/19/2022 Compatible     Unit Dispense Status 04/19/2022 Presumed Trans     Unit Product Volume 04/19/2022 350     hs TnI 4hr 04/19/2022 650*    Delta 4hr hsTnI 04/19/2022 512*    RBC, UA 04/19/2022 None Seen     WBC, UA 04/19/2022 4-10*    Epithelial Cells 04/19/2022 Occasional     Bacteria, UA 04/19/2022 Moderate*    Sodium 04/19/2022 132*    Potassium 04/19/2022 5 4*    Chloride 04/19/2022 97     CO2 04/19/2022 20*    ANION GAP 04/19/2022 15*    BUN 04/19/2022 47*    Creatinine 04/19/2022 1 34*    Glucose 04/19/2022 439*    Calcium 04/19/2022 9 3     eGFR 04/19/2022 34     WBC 04/19/2022 10 07     RBC 04/19/2022 2 93*    Hemoglobin 04/19/2022 8 6*    Hematocrit 04/19/2022 27 7*    MCV 04/19/2022 95     MCH 04/19/2022 29 4     MCHC 04/19/2022 31 0*    RDW 04/19/2022 16 1*    MPV 04/19/2022 11 3     Platelets 27/98/8153 240     Hemoglobin 04/19/2022 9 3*    Segmented % 04/19/2022 93*    Bands % 04/19/2022 2     Lymphocytes % 04/19/2022 4*    Monocytes % 04/19/2022 1*    Eosinophils, % 04/19/2022 0     Basophils % 04/19/2022 0     Absolute Neutrophils 04/19/2022 9 57*    Lymphocytes Absolute 04/19/2022 0 40*    Monocytes Absolute 04/19/2022 0 10     Eosinophils Absolute 04/19/2022 0 00     Basophils Absolute 04/19/2022 0 00     RBC Morphology 04/19/2022 Present     Anisocytosis 04/19/2022 Present     Platelet Estimate 18/47/4391 Adequate     Large Platelet 49/49/9366 Present     POC Glucose 04/19/2022 >500*       Imaging Studies: I have personally reviewed pertinent imaging studies

## 2022-04-19 NOTE — ASSESSMENT & PLAN NOTE
· Initial was 4 5 with repeat of 4 1  · Will gently hydrate with isolyte at 100 cc/hour  · Will continue to trend  · Likely some component of hypoxia as patient had O2 saturation of 80% prior to arrival

## 2022-04-19 NOTE — ASSESSMENT & PLAN NOTE
· Secondary to pneumonia being treated as per above  · Will place on O2 and respiratory protocol  · Patient has no known respiratory history and is on no respiratory medications

## 2022-04-19 NOTE — PROGRESS NOTES
Vancomycin Assessment    Florence Koch is a 80 y o  female who is currently receiving vancomycin 750 mg iv q 24 hrs for Pneumonia     Relevant clinical data and objective history reviewed:  Creatinine   Date Value Ref Range Status   04/19/2022 1 34 (H) 0 60 - 1 30 mg/dL Final     Comment:     Standardized to IDMS reference method   04/18/2022 1 29 0 60 - 1 30 mg/dL Final     Comment:     Standardized to IDMS reference method   12/06/2021 1 37 (H) 0 60 - 1 30 mg/dL Final     Comment:     Standardized to IDMS reference method     /59 (BP Location: Right arm)   Pulse 104   Temp 98 1 °F (36 7 °C)   Resp (!) 26   Ht 5' 4" (1 626 m)   Wt 79 1 kg (174 lb 6 1 oz)   SpO2 95%   BMI 29 93 kg/m²   I/O last 3 completed shifts: In: 1250 [I V :250; Blood:700; IV Piggyback:300]  Out: -   Lab Results   Component Value Date/Time    BUN 47 (H) 04/19/2022 04:13 AM    WBC 10 07 04/19/2022 04:13 AM    HGB 9 3 (L) 04/19/2022 08:07 AM    HCT 27 7 (L) 04/19/2022 04:13 AM    MCV 95 04/19/2022 04:13 AM     04/19/2022 04:13 AM     Temp Readings from Last 3 Encounters:   04/19/22 98 1 °F (36 7 °C)   12/06/21 98 7 °F (37 1 °C)   02/12/20 100 4 °F (38 °C)     Vancomycin Days of Therapy: 1    Assessment/Plan  The patient is currently on vancomycin utilizing scheduled dosing based on adjusted body weight (due to obesity)  Baseline risks associated with therapy include: pre-existing renal impairment, concomitant nephrotoxic medications, advanced age, and dehydration  The patient is currently receiving 750 mg iv q 24 hrs and after clinical evaluation will be continued  Pharmacy will also follow closely for s/sx of nephrotoxicity, infusion reactions, and appropriateness of therapy  BMP and CBC will be ordered per protocol  Plan for trough as patient approaches steady state, prior to the 4th  dose at approximately 0430 on 04/22/22    Due to infection severity, will target a trough of 15-20 (appropriate for most indications)   Pharmacy will continue to follow the patients culture results and clinical progress daily      Letty Barroso, Pharmacist

## 2022-04-19 NOTE — ASSESSMENT & PLAN NOTE
Continue pre-hospital Norvasc 10 mg p o  daily and substitute Cozaar 50 mg p o  daily for pre-hospital Avapro

## 2022-04-19 NOTE — ASSESSMENT & PLAN NOTE
Lab Results   Component Value Date    HGBA1C 7 2 05/06/2020       No results for input(s): POCGLU in the last 72 hours      Blood Sugar Average: Last 72 hrs:     Place on [facility] step to clear liquid diet, hold pre-hospital antihyperglycemics and obtain Accu-Cheks AC and HS with Humalog correction dose a c  and HS

## 2022-04-19 NOTE — ED NOTES
Pt's daughter Soni Rodrigues called with pt's consent, Soni Rodrigues provided update on pt per request      Gabe Ibarra, NABOR  04/19/22 1921

## 2022-04-19 NOTE — CONSULTS
[address] 1/19/1929, 80 y o  female MRN: [id number]  Unit/Bed#: ED 24 Encounter: [id number]  Primary Care Provider: Cynthia Ramos MD   Date and time admitted to hospital: 4/18/2022 10:19 PM    Inpatient consult to Cardiology  Consult performed by: KUSH Cabrales  Consult ordered by: Lukas Rhodes PA-C          Elevated troponin level not due myocardial infarction  Assessment & Plan  Non-MI trop elevation in the setting of sepsis, anemia  Without evidence of cardiac chest pain  Check echo    Hypoxia  Assessment & Plan  Likely related to pneumonia, anemia, and finding of ILD on imaging    Upper GI bleed  Assessment & Plan  Hemoglobin 7 6 on admission  Improved to 9 3 following transfusion  GI consult pending    Right upper lobe pneumonia  Assessment & Plan  Likely contributing to reports of shortness of breath  Antibiotics and management per medical team    Other hyperlipidemia  Assessment & Plan  Continue Pravachol    Primary hypertension  Assessment & Plan  Blood pressure is well controlled  Continue losartan 50 mg daily (on Avapro at home) amlodipine 10 mg daily    * Severe sepsis [facility])  Assessment & Plan  Management per medical team    Other summary comments:   Troponin elevation in the setting of anemia and sepsis  Will check echo suspect this is a non MI troponin elevation  Management other hospital problems per medical team     Outpatient Cardiologist:  none    HPI: Verito Murray is a 80y o  year old female who presented from a nursing facility with increasing shortness of breath  There are reportedly multiple residence with COVID infections  She was noted to have oxygen saturations in the low 80's and was placed on supplemental oxygen      In the emergency department, she was found to be anemic (7 6) with a positive Hemoccult rectal exam   Other pertinent lab findings include hyponatremia, hyperglycemia, hypomagnesmia, lactic acidosis, and elevated troponin  Chest x-ray was suggestive of multi lobar pneumonia and chronic ILD  She was started on antibiotics for pneumonia and received 1 unit of packed red blood cells  Cardiology was asked to evaluate the patient given her troponin elevation  At the time my evaluation, poly notes that she is feeling slightly improved regarding her breathing  She reports a mild discomfort in the left upper chest, just below her clavicle  This has been present for 2 weeks and comes and goes  It is reproducible with movement and light palpation on exam   She denies any pressure, tightness, burning, palpitations  EKG: SR, RBBB      MOST  RECENT CARDIAC IMAGING:   none      Review of Systems: a 10 point review of systems was conducted and is negative except for as mentioned in the HPI or as below  Historical Information   Past Medical History:   Diagnosis Date    Anemia     Basal cell carcinoma of skin of face     Bleeding ulcer     Hypercholesterolemia     Hypertension     Hypothyroidism     Insulin dependent diabetes mellitus      Past Surgical History:   Procedure Laterality Date    CATARACT EXTRACTION Bilateral     MOHS SURGERY      REPLACEMENT TOTAL KNEE Right     SINUS SURGERY      TONSILLECTOMY      WISDOM TOOTH EXTRACTION       Social History     Substance and Sexual Activity   Alcohol Use Never     Social History     Substance and Sexual Activity   Drug Use No     Social History     Tobacco Use   Smoking Status Never Smoker   Smokeless Tobacco Never Used       Family History:  No longer pertinent due to patient age    Meds/Allergies   all current active meds have been reviewed  (Not in a hospital admission)      Allergies   Allergen Reactions    Iv Dye [Iodinated Diagnostic Agents]     Percocet [Oxycodone-Acetaminophen]     Phenobarbital     Statins        Objective   Vitals: Blood pressure 109/55, pulse 97, temperature 98 1 °F (36 7 °C), resp   rate 19, height 5' 3" (1 6 m), weight 81 kg (178 lb 9 2 oz), SpO2 95 %  , Body mass index is 31 63 kg/m² ,   Orthostatic Blood Pressures      Most Recent Value   Blood Pressure 109/55 filed at 04/19/2022 0166          Systolic (48JYW), MFT:347 , Min:108 , EQE:739     Diastolic (94URL), FEM:19, Min:52, Max:61      Physical Exam:    General:  Normal appearance in no distress  Eyes:  Anicteric  Oral mucosa:  Moist   Neck:  No JVD  Carotid upstrokes are brisk without bruits  No masses  Chest:  Fine rales 1/2 way up bilaterally, reproducible discomfort to L chest just below the clavicle  Cardiac:  No palpable PMI  Normal S1 and S2   2/6 systolic murmur   Abdomen:  Soft and nontender  No palpable organomegaly or aortic enlargement  Extremities:  No peripheral edema  Musculoskeletal:  Symmetric  Vascular:  Pedal pulses are intact  Neuro:  Grossly symmetric  Psych:  Alert and oriented x3  Lab Results:     Troponins:    Results from last 7 days   Lab Units 04/19/22  0413 04/19/22  0131 04/18/22  2301   HS TNI 0HR ng/L  --   --  138*   HS TNI 2HR ng/L  --  384*  --    HSTNI D2 ng/L  --  246*  --    HS TNI 4HR ng/L 650*  --   --    HSTNI D4 ng/L 512*  --   --      BNP:   Results from last 6 Months   Lab Units 04/18/22  2301 12/03/21  1639   BNP pg/mL 197* 81       CBC :   Results from last 7 days   Lab Units 04/19/22  0807 04/19/22  0413 04/18/22  2301 04/18/22  2301   WBC Thousand/uL  --  10 07  --  11 01*   HEMOGLOBIN g/dL 9 3* 8 6*   < > 7 6*   HEMATOCRIT %  --  27 7*  --  24 6*   MCV fL  --  95  --  96   PLATELETS Thousands/uL  --  240  --  286    < > = values in this interval not displayed       TSH:     CMP:   Results from last 7 days   Lab Units 04/19/22  0413 04/18/22  2301   POTASSIUM mmol/L 5 4* 5 1   CHLORIDE mmol/L 97 97   CO2 mmol/L 20* 23   BUN mg/dL 47* 42*   CREATININE mg/dL 1 34* 1 29   AST U/L  --  15   ALT U/L  --  9   EGFR ml/min/1 73sq m 34 35     Lipid Profile:     Coags:   Results from last 7 days   Lab Units 04/18/22  2310   INR  1 09

## 2022-04-19 NOTE — ED NOTES
Rectal exam completed by Dr Hailey Howard, chaperoned by this RN  Hemacult positive       Wicho Orellana RN  04/18/22 3031

## 2022-04-19 NOTE — ASSESSMENT & PLAN NOTE
· Likely secondary to upper GI bleed  · Patient is being transfuse 1 unit packed red blood cells  · Will continue monitor H&H  · Will obtain iron studies

## 2022-04-19 NOTE — ASSESSMENT & PLAN NOTE
· Admit to telemetry  · Trend lactic acid procalcitonin  · Blood cultures urine culture currently pending  · Patient did not receive sepsis protocol IV fluid resuscitation secondary to CHF  · Will obtain urine for strep pneumo and Legionella  · Will give Rocephin 1 g IV daily  · Patient met sepsis criteria in that she was tachycardic with a heart rate of 102, tachypnea with respiratory rate of 22, lactic acidosis of 4 5 unknown source of infection being right upper lobe pneumonia

## 2022-04-19 NOTE — ED PROVIDER NOTES
History  Chief Complaint   Patient presents with    Shortness of Breath     onset 9pm     HPI      This is a 80-year-old female presents the emergency department of breath  Patient lives in a skilled nursing facility that approximately 9 p m  she reported to the staff she she was increasingly short of breath  Patient was placed on oxygen 2 L  When the EMS arrived patient's O2 saturations was in the low 80s  EMS reports that the staff at the skilled nursing facilities that they have a lot of COVID infections at this facility  Patient denies any change in her stools, denies chest pain, abdominal pain, back pain but only reports having trouble breathing  When patient arrived she was pale appearing and had lips closed breathing / marginal tripod position  Review of the chart noted the patient had a 3 day admission where she was discharged is 1st week of December 2021 for secondary to acute respiratory failure with hypoxia pneumonia in the right lobe  Other rest medical history is significant for history of hypo magnesium, hypothyroidism,  Prior to Admission Medications   Prescriptions Last Dose Informant Patient Reported? Taking?    Insulin Pen Needle (PEN NEEDLES) 32G X 4 MM MISC   No No   Sig: by Does not apply route 2 (two) times a day   amLODIPine (NORVASC) 10 mg tablet   No No   Sig: Take 1 tablet (10 mg total) by mouth daily   glucose blood (ONE TOUCH ULTRA TEST) test strip   No No   Sig: Use as instructed   glyBURIDE-metFORMIN (GLUCOVANCE) 5-500 MG per tablet   No No   Sig: Take 1 tablet by mouth see administration instructions Take 1 1/2 tablets  twice daily   insulin aspart protamine-insulin aspart (NovoLOG Mix 70/30 FlexPen) 100 Units/mL injection pen   No No   Sig: Inject 30 Units under the skin 2 (two) times a day before meals   irbesartan (AVAPRO) 150 mg tablet   No No   Sig: Take 1 tablet (150 mg total) by mouth daily   levothyroxine 100 mcg tablet   No No   Sig: Take 1 tablet (100 mcg total) by mouth daily   pantoprazole (PROTONIX) 40 mg tablet   No No   Sig: Take 1 tablet (40 mg total) by mouth daily   potassium chloride (K-DUR) 10 mEq tablet   No No   Sig: Take 1 tablet (10 mEq total) by mouth daily   pravastatin (PRAVACHOL) 40 mg tablet   No No   Sig: Take 1 tablet (40 mg total) by mouth daily      Facility-Administered Medications: None       Past Medical History:   Diagnosis Date    Anemia     Basal cell carcinoma of skin of face     Bleeding ulcer     Hypercholesterolemia     Hypertension     Hypothyroidism     Insulin dependent diabetes mellitus        Past Surgical History:   Procedure Laterality Date    CATARACT EXTRACTION Bilateral     MOHS SURGERY      REPLACEMENT TOTAL KNEE Right     SINUS SURGERY      TONSILLECTOMY      WISDOM TOOTH EXTRACTION         Family History   Problem Relation Age of Onset    Lung cancer Sister     Diabetes Mother     Cirrhosis Father      I have reviewed and agree with the history as documented  E-Cigarette/Vaping     E-Cigarette/Vaping Substances     Social History     Tobacco Use    Smoking status: Never Smoker    Smokeless tobacco: Never Used   Substance Use Topics    Alcohol use: Never    Drug use: No   That she is having trouble breathing  That she is having trouble breathing    Review of Systems   Constitutional: Positive for fatigue  HENT: Negative  Negative for congestion  Eyes: Negative  Negative for visual disturbance  Respiratory: Positive for shortness of breath  Negative for cough, wheezing and stridor  Cardiovascular: Negative  Gastrointestinal: Negative  Endocrine: Negative  Genitourinary: Negative  Musculoskeletal: Negative  Skin: Negative  Allergic/Immunologic: Negative  Neurological: Negative  Hematological: Negative  Psychiatric/Behavioral: Negative  Physical Exam  Physical Exam  Vitals and nursing note reviewed  Constitutional:       General: She is in acute distress  Appearance: She is ill-appearing and toxic-appearing  HENT:      Head: Normocephalic and atraumatic  Mouth/Throat:      Mouth: Mucous membranes are moist       Pharynx: Oropharynx is clear  Eyes:      Extraocular Movements: Extraocular movements intact  Pupils: Pupils are equal, round, and reactive to light  Comments: Pale conjunctiva noted  Cardiovascular:      Rate and Rhythm: Normal rate and regular rhythm  Pulmonary:      Effort: Tachypnea, accessory muscle usage and respiratory distress present  Breath sounds: Examination of the right-middle field reveals rales  Examination of the left-middle field reveals rales  Examination of the right-lower field reveals rales  Examination of the left-lower field reveals rales  Rales present  Chest:      Chest wall: No mass or deformity  Abdominal:      General: Bowel sounds are normal       Palpations: Abdomen is soft  Genitourinary:     Rectum: Guaiac result positive  Comments: CUATE Mosquera RN present during rectal exam   Musculoskeletal:         General: Normal range of motion  Cervical back: Normal range of motion and neck supple  Right lower leg: Edema present  Left lower leg: Edema present  Skin:     General: Skin is warm  Capillary Refill: Capillary refill takes less than 2 seconds  Neurological:      General: No focal deficit present  Mental Status: She is alert and oriented to person, place, and time     Psychiatric:         Mood and Affect: Mood normal          Behavior: Behavior normal          Vital Signs  ED Triage Vitals   Temperature Pulse Respirations Blood Pressure SpO2   04/18/22 2224 04/18/22 2224 04/18/22 2224 04/19/22 0007 04/18/22 2224   98 °F (36 7 °C) 102 22 108/52 91 %      Temp Source Heart Rate Source Patient Position - Orthostatic VS BP Location FiO2 (%)   04/18/22 2224 04/18/22 2224 -- -- --   Tympanic Monitor         Pain Score       --                  Vitals:    04/19/22 0045 04/19/22 0100 04/19/22 0115 04/19/22 0145   BP: 130/61 123/60 116/58 116/56   Pulse: 94 94 95 96         Visual Acuity      ED Medications  Medications   sodium chloride 0 9 % infusion (50 mL/hr Intravenous New Bag 4/19/22 0007)   vancomycin (VANCOCIN) IVPB (premix in dextrose) 750 mg 150 mL (has no administration in time range)   amLODIPine (NORVASC) tablet 10 mg (has no administration in time range)   losartan (COZAAR) tablet 50 mg (has no administration in time range)   levothyroxine tablet 100 mcg (has no administration in time range)   potassium chloride (Klor-Con) CR tablet 10 mEq (has no administration in time range)   pravastatin (PRAVACHOL) tablet 40 mg (has no administration in time range)   acetaminophen (TYLENOL) tablet 650 mg (has no administration in time range)   ondansetron (ZOFRAN) injection 4 mg (has no administration in time range)   dextromethorphan-guaiFENesin (ROBITUSSIN DM) oral syrup 10 mL (has no administration in time range)   insulin lispro (HumaLOG) 100 units/mL subcutaneous injection 1-6 Units (has no administration in time range)   insulin lispro (HumaLOG) 100 units/mL subcutaneous injection 1-6 Units (has no administration in time range)   cefTRIAXone (ROCEPHIN) IVPB (premix in dextrose) 1,000 mg 50 mL (has no administration in time range)   pantoprazole (PROTONIX) 80 mg in sodium chloride 0 9 % 100 mL IVPB (has no administration in time range)     And   pantoprazole (PROTONIX) 80 mg in sodium chloride 0 9 % 100 mL infusion (has no administration in time range)   ipratropium-albuterol (FOR EMS ONLY) (DUO-NEB) 0 5-2 5 mg/3 mL inhalation solution 3 mL (0 mL Does not apply Given to EMS 4/18/22 2301)   methylPREDNISolone sodium succinate (FOR EMS ONLY) (Solu-MEDROL) 125 MG injection 125 mg (0 mg Does not apply Given to EMS 4/18/22 2301)   furosemide (LASIX) injection 40 mg (40 mg Intravenous Given 4/18/22 2313)   cefepime (MAXIPIME) IVPB (premix in dextrose) 2,000 mg 50 mL (0 mg Intravenous Stopped 4/19/22 0035)       Diagnostic Studies  Results Reviewed     Procedure Component Value Units Date/Time    Strep Pneumoniae, Urine [[id number]]     Lab Status: No result Specimen: Urine     Legionella antigen, Urine [[id number]]     Lab Status: No result Specimen: Urine     Lactic acid 2 Hours [[id number]] Collected: 04/19/22 0131    Lab Status: In process Specimen: Blood from Arm, Left Updated: 04/19/22 0141    HS Troponin I 2hr [[id number]] Collected: 04/19/22 0131    Lab Status: In process Specimen: Blood from Arm, Left Updated: 04/19/22 0141    Procalcitonin [[id number]]  (Normal) Collected: 04/18/22 2310    Lab Status: Final result Specimen: Blood from Arm, Left Updated: 04/19/22 0055     Procalcitonin 0 10 ng/ml     TSH [[id number]]  (Abnormal) Collected: 04/18/22 2310    Lab Status: Final result Specimen: Blood from Arm, Left Updated: 04/19/22 0033     TSH 3RD GENERATON 4 875 uIU/mL     Narrative:      Patients undergoing fluorescein dye angiography may retain small amounts of fluorescein in the body for 48-72 hours post procedure  Samples containing fluorescein can produce falsely depressed TSH values  If the patient had this procedure,a specimen should be resubmitted post fluorescein clearance  COVID/FLU/RSV - 2 hour TAT [[id number]]  (Normal) Collected: 04/18/22 2235    Lab Status: Final result Specimen: Nares from Nose Updated: 04/19/22 0029     SARS-CoV-2 Negative     INFLUENZA A PCR Negative     INFLUENZA B PCR Negative     RSV PCR Negative    Narrative:      FOR PEDIATRIC PATIENTS - copy/paste COVID Guidelines URL to browser: [email or web address]    SARS-CoV-2 assay is a Nucleic Acid Amplification assay intended for the  qualitative detection of nucleic acid from SARS-CoV-2 in nasopharyngeal  swabs  Results are for the presumptive identification of SARS-CoV-2 RNA      Positive results are indicative of infection with SARS-CoV-2, the virus  causing COVID-19, but do not rule out bacterial infection or co-infection  with other viruses  Laboratories within the [place] and its  territories are required to report all positive results to the appropriate  public health authorities  Negative results do not preclude SARS-CoV-2  infection and should not be used as the sole basis for treatment or other  patient management decisions  Negative results must be combined with  clinical observations, patient history, and epidemiological information  This test has not been FDA cleared or approved  This test has been authorized by FDA under an Emergency Use Authorization  (EUA)  This test is only authorized for the duration of time the  declaration that circumstances exist justifying the authorization of the  emergency use of an in vitro diagnostic tests for detection of SARS-CoV-2  virus and/or diagnosis of COVID-19 infection under section 564(b)(1) of  the Act, 21 U  S C  825AQX-6(Z)(0), unless the authorization is terminated  or revoked sooner  The test has been validated but independent review by FDA  and CLIA is pending  Test performed using [employer] Gene[employer]pert: This RT-PCR assay targets N2,  a region unique to SARS-CoV-2  A conserved region in the E-gene was chosen  for pan-Sarbecovirus detection which includes SARS-CoV-2      HS Troponin I 4hr [[id number]]     Lab Status: No result Specimen: Blood     HS Troponin 0hr (reflex protocol) [[id number]]  (Abnormal) Collected: 04/18/22 2301    Lab Status: Final result Specimen: Blood from Arm, Left Updated: 04/18/22 2339     hs TnI 0hr 138 ng/L     B-Type Natriuretic Peptide(BNP) CA, ,  Campuses Only [[id number]]  (Abnormal) Collected: 04/18/22 2301    Lab Status: Final result Specimen: Blood from Arm, Left Updated: 04/18/22 2338      pg/mL     Lactic acid [[id number]]  (Abnormal) Collected: 04/18/22 2301    Lab Status: Final result Specimen: Blood from Arm, Left Updated: 04/18/22 2336     LACTIC ACID 4 5 mmol/L     Narrative:      Result may be elevated if tourniquet was used during collection  Protime-INR [[id number]]  (Normal) Collected: 04/18/22 2310    Lab Status: Final result Specimen: Blood from Arm, Left Updated: 04/18/22 2336     Protime 14 0 seconds      INR 1 09    APTT [[id number]]  (Normal) Collected: 04/18/22 2310    Lab Status: Final result Specimen: Blood from Arm, Left Updated: 04/18/22 2336     PTT 30 seconds     Comprehensive metabolic panel [[id number]]  (Abnormal) Collected: 04/18/22 2301    Lab Status: Final result Specimen: Blood from Arm, Left Updated: 04/18/22 2330     Sodium 131 mmol/L      Potassium 5 1 mmol/L      Chloride 97 mmol/L      CO2 23 mmol/L      ANION GAP 11 mmol/L      BUN 42 mg/dL      Creatinine 1 29 mg/dL      Glucose 295 mg/dL      Calcium 9 4 mg/dL      AST 15 U/L      ALT 9 U/L      Alkaline Phosphatase 50 U/L      Total Protein 7 0 g/dL      Albumin 3 8 g/dL      Total Bilirubin 0 58 mg/dL      eGFR 35 ml/min/1 73sq m     Narrative:      [place] guidelines for Chronic Kidney Disease (CKD):     Stage 1 with normal or high GFR (GFR > 90 mL/min/1 73 square meters)    Stage 2 Mild CKD (GFR = 60-89 mL/min/1 73 square meters)    Stage 3A Moderate CKD (GFR = 45-59 mL/min/1 73 square meters)    Stage 3B Moderate CKD (GFR = 30-44 mL/min/1 73 square meters)    Stage 4 Severe CKD (GFR = 15-29 mL/min/1 73 square meters)    Stage 5 End Stage CKD (GFR <15 mL/min/1 73 square meters)  Note: GFR calculation is accurate only with a steady state creatinine    Magnesium [[id number]]  (Abnormal) Collected: 04/18/22 2301    Lab Status: Final result Specimen: Blood from Arm, Left Updated: 04/18/22 2330     Magnesium 1 6 mg/dL     Blood culture #2 [[id number]] Collected: 04/18/22 2310    Lab Status:  In process Specimen: Blood from Arm, Left Updated: 04/18/22 2316    CBC and differential [[id number]]  (Abnormal) Collected: 04/18/22 2301    Lab Status: Final result Specimen: Blood from Arm, Left Updated: 04/18/22 2313     WBC 11 01 Thousand/uL      RBC 2 57 Million/uL      Hemoglobin 7 6 g/dL      Hematocrit 24 6 %      MCV 96 fL      MCH 29 6 pg      MCHC 30 9 g/dL      RDW 17 0 %      MPV 11 7 fL      Platelets 956 Thousands/uL      nRBC 0 /100 WBCs      Neutrophils Relative 75 %      Immat GRANS % 1 %      Lymphocytes Relative 16 %      Monocytes Relative 7 %      Eosinophils Relative 1 %      Basophils Relative 0 %      Neutrophils Absolute 8 35 Thousands/µL      Immature Grans Absolute 0 05 Thousand/uL      Lymphocytes Absolute 1 75 Thousands/µL      Monocytes Absolute 0 73 Thousand/µL      Eosinophils Absolute 0 10 Thousand/µL      Basophils Absolute 0 03 Thousands/µL     Blood culture #1 [[id number]] Collected: 04/18/22 2301    Lab Status: In process Specimen: Blood from Arm, Left Updated: 04/18/22 2309    UA w Reflex to Microscopic w Reflex to Culture [[id number]]     Lab Status: No result Specimen: Urine                  CT chest abdomen pelvis wo contrast   Final Result by Uma Billingsley MD (04/19 0023)      Increased right upper lobe groundglass airspace disease which may represent pneumonia  Stable findings of interstitial lung disease compatible with probable UIP versus NSIP  Superior endplate compression fracture of the T12 vertebral body with minimal loss of vertebral body height              Workstation performed: [id number]         XR chest 1 view portable   ED Interpretation by Laura Aguilar III, DO (04/18 2259)   Portable chest x-ray shows volume overload with possible atelectasis                 Procedures  ECG 12 Lead Documentation Only    Date/Time: 4/18/2022 10:26 PM  Performed by: Lorelei Doty DO  Authorized by: Laura Aguilar III, DO     Indications / Diagnosis:  Sob  ECG reviewed by me, the ED Provider: yes    Patient location:  ED  Comments:      I personally reviewed this EKG is performed the patient April 18, 2022, EKG was completed at 10:25 p m  interpreted by me at 10:26 p m   Normal sinus rhythm with a bifascicular block, please note the patient does have some ST depression noted in V2, V3, V4, V5, V6 which was not present during prior tracings as of December 3, 2021  CriticalCare Time  Performed by: Amandeep Alexander DO  Authorized by: Amandeep Alexander DO     Critical care provider statement:     Critical care time (minutes):  65    Critical care start time:  4/18/2022 10:30 PM    Critical care end time:  4/19/2022 12:47 AM    Critical care time was exclusive of:  Separately billable procedures and treating other patients  Comments:      Patient was profoundly hypoxic pre-hospital, O2 saturation was in the 80s, patient found to be anemic, see ED course for specifics, transfuse blood, due to at lactic acid being elevated CT imaging was initiated to rule out any underlining unrecognizable pathology, found to have a pneumonia  ED Course  ED Course as of 04/19/22 0159   Mon Apr 18, 2022   2227 History and physical completed  Orders placed  SOB with documented hypoxia with pale appearing elderly women  Diff dx: Pneumonia vs  CHF vs  Anemia  2334 Rectal heme-positive, hemoglobin is 7 6 hematocrit is 24 6, platelets normal, baseline 4 months ago was 12 3 and 40 0 respectively  Will proceed with blood transfusion  2337 Noted a lactic acid of 4 5, etiology is unclear, due to volume overload presention:     The 30ml/kg fluid bolus was not given to the patient despite hypotension and/or significantly elevated lactate of = 4 and/or presence of septic shock due to: Concern for fluid/volume overload  The patient will be administered 250 ml of crystalloid fluid instead  Orders for this have been placed in Epic  The patient may receive additional colloid or crystalloid fluids thereafter based on clinical condition       Lb Toth III, DO     Patient is hemodynamically stable, O2 saturation is 94% on 4 L of nasal cannula, decreased work of breathing with repositioning, due to significant hypoxia which is been corrected with oxygen administration and now found to be significantly anemic patient is not requiring BiPAP, consented for blood, will transfuse 1 unit slowly due to volume overloaded state  Will proceed with CT imaging of C / A / P w/o contrast due to significant dye allergy  Tue Apr 19, 2022   0030 CT showed:    Increased right upper lobe groundglass airspace disease which may represent pneumonia      Stable findings of interstitial lung disease compatible with probable UIP versus NSIP      Superior endplate compression fracture of the T12 vertebral body with minimal loss of vertebral body height  3209 With Wicho Orellana RN, present during my conversation with the patient patient code status was reviewed, she wishes not to be intubated or resuscitated if need be during this hospitalization  8119 Case d/w Hansa Julio PA-C from hospitalist service, will accept on to his service under Dr Scott Escalona  HEART Risk Score      Most Recent Value   Heart Score Risk Calculator    History 1 Filed at: 04/19/2022 0008   ECG 1 Filed at: 04/19/2022 0008   Age 2 Filed at: 04/19/2022 0008   Risk Factors 1 Filed at: 04/19/2022 0008   Troponin 2 Filed at: 04/19/2022 0008   HEART Score 7 Filed at: 04/19/2022 0008                     Initial Sepsis Screening     Row Name 04/19/22 0050                Is the patient's history suggestive of a new or worsening infection? Yes (Proceed)  -GC        Suspected source of infection pneumonia;urinary tract infection  -GC        Are two or more of the following signs & symptoms of infection both present and new to the patient?  Yes (Proceed)  -GC        Indicate SIRS criteria Tachycardia > 90 bpm  -GC        If the answer is yes to both questions, suspicion of sepsis is present --        If severe sepsis is present AND tissue hypoperfusion perists in the hour after fluid resuscitation or lactate > 4, the patient meets criteria for SEPTIC SHOCK --        Are any of the following organ dysfunction criteria present within 6 hours of suspected infection and SIRS criteria that are NOT considered to be chronic conditions? No  -GC        Organ dysfunction Lactate > 2 0 mmol/L  -GC        Date of presentation of severe sepsis --        Time of presentation of severe sepsis --        Tissue hypoperfusion persists in the hour after crystalloid fluid administration, evidenced, by either: --        Was hypotension present within one hour of the conclusion of crystalloid fluid administration? No  -GC        Date of presentation of septic shock --        Time of presentation of septic shock --              User Key  (r) = Recorded By, (t) = Taken By, (c) = Cosigned By    [address] Name Provider Type    GC Amandeep Alexander DO Physician                              MDM  Number of Diagnoses or Management Options  Anemia  GI bleed  Hyperglycemia  Hypoxia  Pneumonia  Volume overload  Diagnosis management comments: 80year-old presents short of breath hypoxic evidence of volume overload, appeared to be anemic pale-appearing, hemoglobin 7 6 with baseline of 12 3, 4 months four months ago, heme-positive from below, oxygen when initially at 2 L bumped up to 4 L to maintain O2 saturation greater than 92%, initial differential diagnosis was CHF versus pneumonia versus anemia  Chest x-ray showed volume overload, BMP level being elevated, Lasix given, lactic acid was greater than 4, broad-spectrum antibiotics ordered, fluids held secondary to concern for volume overload which could be exacerbated with infusion of blood products (see note regarding a withholding fluid resuscitation),  initial concern was secondary to hypoxia related to volume overload and anemia    CT of the chest abdomen pelvis without IV contrast (documented on dye allergy) showed pneumonia with a incidental finding of lumbar compression fracture  Reviewed this finding with the patient noted that she has not fallen recently  Patient consented for blood in the emergency department, goals of care discussed with patient, level 3 DNR, elevated troponin levels consistent with history of anemia hypoxia, heart score 7, aspirin held secondary to GI bleed,  admit to the hospitalist service  Portions of the record may have been created with voice recognition software  Occasional wrong word or "sound a like" substitutions may have occurred due to the inherent limitations of voice recognition software  Read the chart carefully and recognize, using context, where substitutions have occurred         Amount and/or Complexity of Data Reviewed  Clinical lab tests: ordered and reviewed  Tests in the radiology section of CPT®: ordered and reviewed  Tests in the medicine section of CPT®: ordered and reviewed  Discussion of test results with the performing providers: yes  Decide to obtain previous medical records or to obtain history from someone other than the patient: yes  Review and summarize past medical records: yes  Discuss the patient with other providers: yes  Independent visualization of images, tracings, or specimens: yes        Disposition  Final diagnoses:   Hypoxia   Anemia   Volume overload   Hyperglycemia   Pneumonia   GI bleed     Time reflects when diagnosis was documented in both MDM as applicable and the Disposition within this note     Time User Action Codes Description Comment    4/19/2022 12:03 AM Melvia Shanika Add [R09 02] Hypoxia     4/19/2022 12:03 AM Letty Nathan [D64 9] Anemia     4/19/2022 12:03 AM Melvia Shanika Add [E87 70] Volume overload     4/19/2022 12:03 AM Melvia Shanika Add [R73 9] Hyperglycemia     4/19/2022 12:55 AM Melvia Shanika Add [J18 9] Pneumonia     4/19/2022  1:06 AM Juli Romanian R Add [K92 2] Upper GI bleed     4/19/2022  1:58 AM Melvia Shanika Add [K92 2] GI bleed       ED Disposition     ED Disposition Condition Date/Time Comment    Admit Stable [place] Apr 19, 2022 12:57 AM Case was discussed with Mayuri Wild PA-C and the patient's admission status was agreed to be Admission Status: inpatient status to the service of Dr Yessica Garrett   Follow-up Information    None         Patient's Medications   Discharge Prescriptions    No medications on file       No discharge procedures on file      PDMP Review     None          ED Provider  Electronically Signed by           Blanca Duenas III, DO  04/19/22 0159

## 2022-04-19 NOTE — ASSESSMENT & PLAN NOTE
· The setting of sepsis  · Will continue pre-hospital Norvasc, Cozaar and statin  · No aspirin secondary to GI bleed  · Trend cardiac enzymes  · Obtain serial EKGs  · Consult Cardiology in a m

## 2022-04-19 NOTE — ASSESSMENT & PLAN NOTE
Blood pressure is well controlled  Continue losartan 50 mg daily (on Avapro at home) amlodipine 10 mg daily

## 2022-04-19 NOTE — H&P
[facility] 1/19/1929, 80 y o  female MRN: [id number]  Unit/Bed#: ED 24 Encounter: [id number]  Primary Care Provider: Marjorie Chaudhari MD   Date and time admitted to hospital: 4/18/2022 10:19 PM    * Severe sepsis [facility])  Assessment & Plan  · Admit to telemetry  · Trend lactic acid procalcitonin  · Blood cultures urine culture currently pending  · Patient did not receive sepsis protocol IV fluid resuscitation secondary to CHF  · Will obtain urine for strep pneumo and Legionella  · Will give Rocephin 1 g IV daily  · Patient met sepsis criteria in that she was tachycardic with a heart rate of 102, tachypnea with respiratory rate of 22, lactic acidosis of 4 5 unknown source of infection being right upper lobe pneumonia    Upper GI bleed  Assessment & Plan  · Patient has a history of bleeding ulcer  · Will place on clear liquid diet  · Obtain H&H q 8 hours  · Heme check stools  · Place on Protonix drip  · Consult GI    Anemia  Assessment & Plan  · Likely secondary to upper GI bleed  · Patient is being transfuse 1 unit packed red blood cells  · Will continue monitor H&H  · Will obtain iron studies    Right upper lobe pneumonia  Assessment & Plan  · Being treated as per above  · Right upper ground-glass opacities noted on CT scan    Elevated troponin I level  Assessment & Plan  · The setting of sepsis  · Will continue pre-hospital Norvasc, Cozaar and statin  · No aspirin secondary to GI bleed  · Trend cardiac enzymes  · Obtain serial EKGs  · Consult Cardiology in a m      Hypoxia  Assessment & Plan  · Secondary to pneumonia being treated as per above  · Will place on O2 and respiratory protocol  · Patient has no known respiratory history and is on no respiratory medications    Acute cystitis without hematuria  Assessment & Plan  · Will give Rocephin 1 g IV daily as per above  · Urine cultures are currently pending    Lactic acidosis  Assessment & Plan  · Initial was 4 5 with repeat of 4 1  · Will gently hydrate with isolyte at 100 cc/hour  · Will continue to trend  · Likely some component of hypoxia as patient had O2 saturation of 80% prior to arrival      Hypothyroidism  Assessment & Plan  Continue pre-hospital levothyroxine 125 mcg p o  daily    Diabetes mellitus type 2, insulin dependent (HCC)  Assessment & Plan  Lab Results   Component Value Date    HGBA1C 7 2 05/06/2020       No results for input(s): POCGLU in the last 72 hours  Blood Sugar Average: Last 72 hrs:     Place on [facility] step to clear liquid diet, hold pre-hospital antihyperglycemics and obtain Accu-Cheks AC and HS with Humalog correction dose a c  and HS    Other hyperlipidemia  Assessment & Plan  Continue pre-hospital Pravachol 40 mg p o  daily    Primary hypertension  Assessment & Plan  Continue pre-hospital Norvasc 10 mg p o  daily and substitute Cozaar 50 mg p o  daily for pre-hospital Avapro    VTE Prophylaxis: SCDs only secondary to GI bleed  Code Status:  Level 3  Discussion with family:  None present at bedside at time of exam    Anticipated Length of Stay:  Patient will be admitted on an Inpatient basis with an anticipated length of stay of  > 2 midnights  Justification for Hospital Stay:  Severe sepsis secondary to right upper lobe pneumonia requiring IV antibiotics, upper GI bleed requiring further GI evaluation, elevated troponin requiring further cardiac evaluation    Total Time for Visit, including Counseling / Coordination of Care: 1 hour  Greater than 50% of this total time spent on direct patient counseling and coordination of care  Chief Complaint:   Shortness of breath x1 day    History of Present Illness:    Marcell Pyle is a 80 y o  female who presents with shortness of breath x1 day  Patient presents ER for further evaluation treatment 1 day history of shortness of breath  Patient lives at a skilled nursing facility approximately 9:00 p m   Last evening she reported the staff that she was increasingly short of breath  Patient was placed on oxygen 2 L nasal cannula and when EMS arrived patient's O2 saturations were found to be in the low 80s  Patient denies any fever chills, no cough, no chest pain, palpitations, lightheadedness or dizziness  Patient is otherwise without complaint other than her shortness of breath  While in the ER patient was found to be anemic and on rectal exam by ER staff she was heme-positive the patient denies any bright red blood per rectum or dark stools  She does state that she has a history of a bleeding ulcer long time ago but is on no medications for same and is unsure when she last had an upper endoscopy and does not follow with GI  Review of Systems:    Review of Systems   Constitutional: Positive for fatigue  Negative for chills and fever  HENT: Negative for congestion and sore throat  Respiratory: Positive for shortness of breath  Negative for cough and wheezing  Cardiovascular: Negative for chest pain and palpitations  Gastrointestinal: Negative for abdominal pain, diarrhea, nausea and vomiting  Genitourinary: Negative for dysuria, frequency, hematuria and urgency  Musculoskeletal: Negative for arthralgias and myalgias  Skin: Negative for wound  Neurological: Negative for dizziness, syncope, light-headedness and headaches  All other systems reviewed and are negative        Past Medical and Surgical History:     Past Medical History:   Diagnosis Date    Anemia     Basal cell carcinoma of skin of face     Bleeding ulcer     Hypercholesterolemia     Hypertension     Hypothyroidism     Insulin dependent diabetes mellitus        Past Surgical History:   Procedure Laterality Date    CATARACT EXTRACTION Bilateral     MOHS SURGERY      REPLACEMENT TOTAL KNEE Right     SINUS SURGERY      TONSILLECTOMY      WISDOM TOOTH EXTRACTION         Meds/Allergies:    Prior to Admission medications    Medication Sig Start Date End Date Taking? Authorizing Provider   amLODIPine (NORVASC) 10 mg tablet Take 1 tablet (10 mg total) by mouth daily 3/23/20   Shanon Check, KUSH   glucose blood (ONE TOUCH ULTRA TEST) test strip Use as instructed 3/23/20   KUSH Carey   glyBURIDE-metFORMIN (GLUCOVANCE) 5-500 MG per tablet Take 1 tablet by mouth see administration instructions Take 1 1/2 tablets  twice daily 3/23/20   KUSH Carey   insulin aspart protamine-insulin aspart (NovoLOG Mix 70/30 FlexPen) 100 Units/mL injection pen Inject 30 Units under the skin 2 (two) times a day before meals 3/23/20   KUSH Carey   Insulin Pen Needle (PEN NEEDLES) 32G X 4 MM MISC by Does not apply route 2 (two) times a day 3/23/20   Shanon Check, KUSH   irbesartan (AVAPRO) 150 mg tablet Take 1 tablet (150 mg total) by mouth daily 3/23/20   Shanon Check, KUSH   levothyroxine 100 mcg tablet Take 1 tablet (100 mcg total) by mouth daily 3/23/20   Shanon Check, KUSH   pantoprazole (PROTONIX) 40 mg tablet Take 1 tablet (40 mg total) by mouth daily 3/23/20   Shanon Check, KUSH   potassium chloride (K-DUR) 10 mEq tablet Take 1 tablet (10 mEq total) by mouth daily 3/23/20   Shanon Check, KUSH   pravastatin (PRAVACHOL) 40 mg tablet Take 1 tablet (40 mg total) by mouth daily 3/23/20   Shanon CheckKUSH     all medications and allergies reviewed    Allergies: Allergies   Allergen Reactions    Iv Dye [Iodinated Diagnostic Agents]     Percocet [Oxycodone-Acetaminophen]     Phenobarbital     Statins        Social History:     Marital Status:     Occupation:  Retired  Patient Pre-hospital Living Situation:  [address]  Patient Pre-hospital Level of Mobility:  Full with assist of a walker  Patient Pre-hospital Diet Restrictions:  None  Substance Use History:   Social History     Substance and Sexual Activity   Alcohol Use Never     Social History     Tobacco Use   Smoking Status Never Smoker   Smokeless Tobacco Never Used Social History     Substance and Sexual Activity   Drug Use No       Family History:  I have reviewed the patient's family history    Physical Exam:     Vitals:   Blood Pressure: 114/55 (04/19/22 0336)  Pulse: 95 (04/19/22 0336)  Temperature: 98 1 °F (36 7 °C) (04/19/22 0336)  Temp Source: Tympanic (04/19/22 0145)  Respirations: 18 (04/19/22 0336)  Height: 5' 3" (160 cm) (04/18/22 2224)  Weight - Scale: 81 kg (178 lb 9 2 oz) (04/18/22 2224)  SpO2: 94 % (04/19/22 0336)    Physical Exam  Vitals and nursing note reviewed  Constitutional:       General: She is not in acute distress  Appearance: Normal appearance  She is ill-appearing  HENT:      Head: Normocephalic and atraumatic  Right Ear: Tympanic membrane normal       Left Ear: Tympanic membrane normal       Nose: Nose normal       Mouth/Throat:      Mouth: Mucous membranes are moist       Pharynx: Oropharynx is clear  No posterior oropharyngeal erythema  Eyes:      Extraocular Movements: Extraocular movements intact  Conjunctiva/sclera: Conjunctivae normal       Pupils: Pupils are equal, round, and reactive to light  Cardiovascular:      Rate and Rhythm: Normal rate and regular rhythm  Pulses: Normal pulses  Heart sounds: Murmur heard  Pulmonary:      Effort: Pulmonary effort is normal  No respiratory distress  Breath sounds: Rales present  No rhonchi  Abdominal:      General: Bowel sounds are normal       Palpations: Abdomen is soft  Tenderness: There is no abdominal tenderness  Genitourinary:     Rectum: Guaiac result positive  Comments: Heme-positive in the ER  Musculoskeletal:      Cervical back: Normal range of motion and neck supple  No muscular tenderness  Right lower leg: No edema  Left lower leg: No edema  Skin:     General: Skin is warm and dry  Capillary Refill: Capillary refill takes less than 2 seconds  Coloration: Skin is pale     Neurological:      General: No focal deficit present  Mental Status: She is alert and oriented to person, place, and time  Additional Data:     Lab Results: I have personally reviewed pertinent reports  Results from last 7 days   Lab Units 04/18/22  2301   WBC Thousand/uL 11 01*   HEMOGLOBIN g/dL 7 6*   HEMATOCRIT % 24 6*   PLATELETS Thousands/uL 286   NEUTROS PCT % 75   LYMPHS PCT % 16   MONOS PCT % 7   EOS PCT % 1     Results from last 7 days   Lab Units 04/18/22  2301   SODIUM mmol/L 131*   POTASSIUM mmol/L 5 1   CHLORIDE mmol/L 97   CO2 mmol/L 23   BUN mg/dL 42*   CREATININE mg/dL 1 29   ANION GAP mmol/L 11   CALCIUM mg/dL 9 4   ALBUMIN g/dL 3 8   TOTAL BILIRUBIN mg/dL 0 58   ALK PHOS U/L 50   ALT U/L 9   AST U/L 15   GLUCOSE RANDOM mg/dL 295*     Results from last 7 days   Lab Units 04/18/22  2310   INR  1 09             Results from last 7 days   Lab Units 04/19/22  0131 04/18/22  2310 04/18/22  2301   LACTIC ACID mmol/L 4 1*  --  4 5*   PROCALCITONIN ng/ml  --  0 10  --        Imaging: I have personally reviewed pertinent reports  CT chest abdomen pelvis wo contrast   Final Result by Jimbo Carrington MD (04/19 0023)      Increased right upper lobe groundglass airspace disease which may represent pneumonia  Stable findings of interstitial lung disease compatible with probable UIP versus NSIP  Superior endplate compression fracture of the T12 vertebral body with minimal loss of vertebral body height  Workstation performed: [id number]         XR chest 1 view portable   ED Interpretation by Daniel Munguia III, DO (04/18 9079)   Portable chest x-ray shows volume overload with possible atelectasis            EKG, Pathology, and Other Studies Reviewed on Admission:   · EKG:  Normal sinus rhythm with bifascicular block with ST depression in V2 through V6    Epic / Care Everywhere Records Reviewed: Yes    ** Please Note: This note has been constructed using a voice recognition system   **

## 2022-04-19 NOTE — SEPSIS NOTE
Sepsis Note   Sinan Simms 80 y o  female MRN: [id number]  Unit/Bed#: ED 24 Encounter: [id number]       qSOFA     Row Name 04/19/22 0034 04/19/22 0007 04/18/22 2224          Altered mental status GCS < 15 -- -- --      Respiratory Rate > / =22 0 0 1      Systolic BP < / =623 0 0 --      Q Sofa Score 0 0 --                 Initial Sepsis Screening     Row Name 04/19/22 0050                Is the patient's history suggestive of a new or worsening infection? Yes (Proceed)  -GC        Suspected source of infection pneumonia;urinary tract infection  -GC        Are two or more of the following signs & symptoms of infection both present and new to the patient? Yes (Proceed)  -GC        Indicate SIRS criteria Tachycardia > 90 bpm  -GC        If the answer is yes to both questions, suspicion of sepsis is present --        If severe sepsis is present AND tissue hypoperfusion perists in the hour after fluid resuscitation or lactate > 4, the patient meets criteria for SEPTIC SHOCK --        Are any of the following organ dysfunction criteria present within 6 hours of suspected infection and SIRS criteria that are NOT considered to be chronic conditions? No  -GC        Organ dysfunction Lactate > 2 0 mmol/L  -GC        Date of presentation of severe sepsis --        Time of presentation of severe sepsis --        Tissue hypoperfusion persists in the hour after crystalloid fluid administration, evidenced, by either: --        Was hypotension present within one hour of the conclusion of crystalloid fluid administration?  No  -GC        Date of presentation of septic shock --        Time of presentation of septic shock --              User Key  (r) = Recorded By, (t) = Taken By, (c) = Cosigned By    [address] Name Provider Type    GC Andie Cheema DO Physician

## 2022-04-19 NOTE — ASSESSMENT & PLAN NOTE
Hemoglobin 7 6 on admission  Improved to 9 3 following transfusion  No evidence of bleeding; no plans for endoscopy

## 2022-04-20 LAB
ANION GAP SERPL CALCULATED.3IONS-SCNC: 7 MMOL/L (ref 4–13)
BASOPHILS # BLD AUTO: 0.02 THOUSANDS/ΜL (ref 0–0.1)
BASOPHILS NFR BLD AUTO: 0 % (ref 0–1)
BUN SERPL-MCNC: 43 MG/DL (ref 5–25)
CALCIUM SERPL-MCNC: 8.9 MG/DL (ref 8.4–10.2)
CARDIAC TROPONIN I PNL SERPL HS: 643 NG/L
CHLORIDE SERPL-SCNC: 100 MMOL/L (ref 96–108)
CO2 SERPL-SCNC: 28 MMOL/L (ref 21–32)
CREAT SERPL-MCNC: 1.11 MG/DL (ref 0.6–1.3)
EOSINOPHIL # BLD AUTO: 0.02 THOUSAND/ΜL (ref 0–0.61)
EOSINOPHIL NFR BLD AUTO: 0 % (ref 0–6)
ERYTHROCYTE [DISTWIDTH] IN BLOOD BY AUTOMATED COUNT: 16.6 % (ref 11.6–15.1)
GFR SERPL CREATININE-BSD FRML MDRD: 42 ML/MIN/1.73SQ M
GLUCOSE SERPL-MCNC: 205 MG/DL (ref 65–140)
GLUCOSE SERPL-MCNC: 207 MG/DL (ref 65–140)
GLUCOSE SERPL-MCNC: 229 MG/DL (ref 65–140)
GLUCOSE SERPL-MCNC: 234 MG/DL (ref 65–140)
GLUCOSE SERPL-MCNC: 244 MG/DL (ref 65–140)
HCT VFR BLD AUTO: 28 % (ref 34.8–46.1)
HGB BLD-MCNC: 8.4 G/DL (ref 11.5–15.4)
IMM GRANULOCYTES # BLD AUTO: 0.07 THOUSAND/UL (ref 0–0.2)
IMM GRANULOCYTES NFR BLD AUTO: 1 % (ref 0–2)
LYMPHOCYTES # BLD AUTO: 1.34 THOUSANDS/ΜL (ref 0.6–4.47)
LYMPHOCYTES NFR BLD AUTO: 11 % (ref 14–44)
MAGNESIUM SERPL-MCNC: 2 MG/DL (ref 1.9–2.7)
MCH RBC QN AUTO: 29 PG (ref 26.8–34.3)
MCHC RBC AUTO-ENTMCNC: 30 G/DL (ref 31.4–37.4)
MCV RBC AUTO: 97 FL (ref 82–98)
MONOCYTES # BLD AUTO: 0.94 THOUSAND/ΜL (ref 0.17–1.22)
MONOCYTES NFR BLD AUTO: 8 % (ref 4–12)
NEUTROPHILS # BLD AUTO: 9.53 THOUSANDS/ΜL (ref 1.85–7.62)
NEUTS SEG NFR BLD AUTO: 80 % (ref 43–75)
NRBC BLD AUTO-RTO: 0 /100 WBCS
PHOSPHATE SERPL-MCNC: 3.1 MG/DL (ref 2.3–4.1)
PLATELET # BLD AUTO: 234 THOUSANDS/UL (ref 149–390)
PMV BLD AUTO: 10.6 FL (ref 8.9–12.7)
POTASSIUM SERPL-SCNC: 4.6 MMOL/L (ref 3.5–5.3)
PROCALCITONIN SERPL-MCNC: 0.09 NG/ML
RBC # BLD AUTO: 2.9 MILLION/UL (ref 3.81–5.12)
SODIUM SERPL-SCNC: 135 MMOL/L (ref 135–147)
WBC # BLD AUTO: 11.92 THOUSAND/UL (ref 4.31–10.16)

## 2022-04-20 PROCEDURE — 99232 SBSQ HOSP IP/OBS MODERATE 35: CPT | Performed by: NURSE PRACTITIONER

## 2022-04-20 PROCEDURE — 84145 PROCALCITONIN (PCT): CPT | Performed by: PHYSICIAN ASSISTANT

## 2022-04-20 PROCEDURE — 99232 SBSQ HOSP IP/OBS MODERATE 35: CPT | Performed by: INTERNAL MEDICINE

## 2022-04-20 PROCEDURE — 84100 ASSAY OF PHOSPHORUS: CPT | Performed by: INTERNAL MEDICINE

## 2022-04-20 PROCEDURE — 83735 ASSAY OF MAGNESIUM: CPT | Performed by: INTERNAL MEDICINE

## 2022-04-20 PROCEDURE — 85025 COMPLETE CBC W/AUTO DIFF WBC: CPT | Performed by: INTERNAL MEDICINE

## 2022-04-20 PROCEDURE — 84484 ASSAY OF TROPONIN QUANT: CPT | Performed by: PHYSICIAN ASSISTANT

## 2022-04-20 PROCEDURE — 80048 BASIC METABOLIC PNL TOTAL CA: CPT | Performed by: INTERNAL MEDICINE

## 2022-04-20 PROCEDURE — 82948 REAGENT STRIP/BLOOD GLUCOSE: CPT

## 2022-04-20 RX ORDER — NITROGLYCERIN 0.4 MG/1
0.4 TABLET SUBLINGUAL
Status: DISCONTINUED | OUTPATIENT
Start: 2022-04-20 | End: 2022-04-21 | Stop reason: HOSPADM

## 2022-04-20 RX ADMIN — CEFTRIAXONE 1000 MG: 1 INJECTION, SOLUTION INTRAVENOUS at 05:41

## 2022-04-20 RX ADMIN — INSULIN LISPRO 2 UNITS: 100 INJECTION, SOLUTION INTRAVENOUS; SUBCUTANEOUS at 21:22

## 2022-04-20 RX ADMIN — SODIUM CHLORIDE 200 MG: 9 INJECTION, SOLUTION INTRAVENOUS at 10:14

## 2022-04-20 RX ADMIN — INSULIN LISPRO 2 UNITS: 100 INJECTION, SOLUTION INTRAVENOUS; SUBCUTANEOUS at 07:58

## 2022-04-20 RX ADMIN — ACETAMINOPHEN 650 MG: 325 TABLET ORAL at 21:22

## 2022-04-20 RX ADMIN — INSULIN ASPART 30 UNITS: 100 INJECTION, SUSPENSION SUBCUTANEOUS at 07:58

## 2022-04-20 RX ADMIN — INSULIN ASPART 30 UNITS: 100 INJECTION, SUSPENSION SUBCUTANEOUS at 17:23

## 2022-04-20 RX ADMIN — ACETAMINOPHEN 650 MG: 325 TABLET ORAL at 12:13

## 2022-04-20 RX ADMIN — PANTOPRAZOLE SODIUM 40 MG: 40 TABLET, DELAYED RELEASE ORAL at 05:40

## 2022-04-20 RX ADMIN — LEVOTHYROXINE SODIUM 125 MCG: 25 TABLET ORAL at 08:31

## 2022-04-20 RX ADMIN — PRAVASTATIN SODIUM 40 MG: 20 TABLET ORAL at 08:31

## 2022-04-20 RX ADMIN — POTASSIUM CHLORIDE 10 MEQ: 750 TABLET, EXTENDED RELEASE ORAL at 08:31

## 2022-04-20 RX ADMIN — INSULIN LISPRO 3 UNITS: 100 INJECTION, SOLUTION INTRAVENOUS; SUBCUTANEOUS at 17:23

## 2022-04-20 RX ADMIN — NITROGLYCERIN 0.4 MG: 0.4 TABLET SUBLINGUAL at 22:42

## 2022-04-20 RX ADMIN — SODIUM CHLORIDE, SODIUM GLUCONATE, SODIUM ACETATE, POTASSIUM CHLORIDE, MAGNESIUM CHLORIDE, SODIUM PHOSPHATE, DIBASIC, AND POTASSIUM PHOSPHATE 75 ML/HR: .53; .5; .37; .037; .03; .012; .00082 INJECTION, SOLUTION INTRAVENOUS at 04:15

## 2022-04-20 RX ADMIN — VANCOMYCIN HYDROCHLORIDE 750 MG: 750 INJECTION, SOLUTION INTRAVENOUS at 05:18

## 2022-04-20 RX ADMIN — INSULIN LISPRO 3 UNITS: 100 INJECTION, SOLUTION INTRAVENOUS; SUBCUTANEOUS at 12:07

## 2022-04-20 NOTE — CASE MANAGEMENT
Case Management Assessment & Discharge Planning Note    Patient name Osbaldo Rucker  Location ICU /ICU  MRN [id number]  : 1929 Date 2022       Current Admission Date: 2022  Current Admission Diagnosis:Severe sepsis [facility])   Patient Active Problem List    Diagnosis Date Noted    Severe sepsis ([facility] [address] ) 2022    Right upper lobe pneumonia 2022    Upper GI bleed 2022    Anemia 2022    Acute cystitis without hematuria 2022    Hypoxia 2022    Elevated troponin level not due myocardial infarction 2022    Lactic acidosis 2022    Acute respiratory failure with hypoxia ([facility] [address] ) 2021    Hypomagnesemia 2021    Microalbuminuria 2020    Overweight (BMI 25 0-29 9) 2019    Other hyperlipidemia 2018    Diabetes mellitus type 2, insulin dependent ([facility] [address] )     Hypothyroidism     Basal cell carcinoma of skin of face     Primary hypertension 2018      LOS (days): 1  Geometric Mean LOS (GMLOS) (days): 4 80  Days to GMLOS:3 2     OBJECTIVE:    Risk of Unplanned Readmission Score: 17     Current admission status: Inpatient    Preferred Pharmacy:   [address], [address] [place] [address]  Phone: [phone] Fax: [phone] [facility] [place] [id number]  Phone: [phone] Fax: [phone]    Primary Care Provider: Heather Waggoner MD    Primary Insurance: MEDICARE  Secondary Insurance: [facility]    ASSESSMENT:  Active Health Care Proxies    There are no active Health Care Proxies on file         DISCHARGE DETAILS:    Discharge planning discussed with[de-identified] Pt daughter, son and Skip Cochran [facility] [address] of Choice: Yes  Comments - Freedom of Choice: Pt wants to go back to the [facility] and continue [address]         Is the patient interested in [address] at discharge?: No    DME Referral Provided  Referral made for DME?: No    Would you like to participate in our [address] service program?  : No - Declined    Treatment Team Recommendation: [place]  Discharge Destination Plan[de-identified] [place]  Transport at Discharge : Family     IMM Given (Date):: 04/20/22  IMM Given to[de-identified] Patient   Pt family states they will transport pt back to the facility and have her portable oxygen  TC to [facility], spoke to   gaviota the nurse who was made aware will be DC back tomorrow  Pt is active with [facility] and will resume same  IMM reviewed with patient, patient agrees with discharge determination

## 2022-04-20 NOTE — ASSESSMENT & PLAN NOTE
Lab Results   Component Value Date    HGBA1C 7 2 05/06/2020       Recent Labs     04/19/22  1730 04/19/22  1829 04/19/22  2106 04/20/22  0706   POCGLU 149* 163* 234* 205*       Blood Sugar Average: Last 72 hrs:  (P) 293 3   Place on [facility] step to clear liquid diet, hold pre-hospital antihyperglycemics and obtain Accu-Cheks AC and HS with Humalog correction dose a c  and HS  Resume home Novolog 70/30 30 units BID

## 2022-04-20 NOTE — ASSESSMENT & PLAN NOTE
· Likely secondary to upper GI bleed  · Patient is s/p 1 unit packed red blood cells  · Iron low ; will give Venofer  · Patient refusing endoscopic evaluation at this time

## 2022-04-20 NOTE — NURSING NOTE
Pt c/o of back pain  States its a 7/10 in lower back  Per family at bedside this is chronic pain for her  Pt and family requesting Tylenol

## 2022-04-20 NOTE — PLAN OF CARE
Problem: GASTROINTESTINAL - ADULT  Goal: Minimal or absence of nausea and/or vomiting  Description: INTERVENTIONS:  - Administer IV fluids if ordered to ensure adequate hydration  - Maintain NPO status until nausea and vomiting are resolved  - Nasogastric tube if ordered  - Administer ordered antiemetic medications as needed  - Provide nonpharmacologic comfort measures as appropriate  - Advance diet as tolerated, if ordered  - Consider nutrition services referral to assist patient with adequate nutrition and appropriate food choices  Outcome: Progressing    Problem: HEMATOLOGIC - ADULT  Goal: Maintains hematologic stability  Description: INTERVENTIONS  - Assess for signs and symptoms of bleeding or hemorrhage  - Monitor labs  - Administer supportive blood products/factors as ordered and appropriate  Outcome: Progressing     Problem: INFECTION - ADULT  Goal: Absence or prevention of progression during hospitalization  Description: INTERVENTIONS:  - Assess and monitor for signs and symptoms of infection  - Monitor lab/diagnostic results  - Monitor all insertion sites, i e  indwelling lines, tubes, and drains  - Monitor endotracheal if appropriate and nasal secretions for changes in amount and color  - [place] appropriate cooling/warming therapies per order  - Administer medications as ordered  - Instruct and encourage patient and family to use good hand hygiene technique  - Identify and instruct in appropriate isolation precautions for identified infection/condition  Outcome: Progressing

## 2022-04-20 NOTE — ASSESSMENT & PLAN NOTE
· Resolved  · Blood cultures with no growth at 24 hours  · Continue Rocephin 1 g IV daily  · Patient met sepsis criteria in that she was tachycardic with a heart rate of 102, tachypnea with respiratory rate of 22, lactic acidosis of 4 5 unknown source of infection being right upper lobe pneumonia  · No need to further trend lactic acid  · Downgrade to med surg Enbrel Counseling:  I discussed with the patient the risks of etanercept including but not limited to myelosuppression, immunosuppression, autoimmune hepatitis, demyelinating diseases, lymphoma, and infections.  The patient understands that monitoring is required including a PPD at baseline and must alert us or the primary physician if symptoms of infection or other concerning signs are noted.

## 2022-04-20 NOTE — PLAN OF CARE
Problem: Potential for Falls  Goal: Patient will remain free of falls  Description: INTERVENTIONS:  - Educate patient/family on patient safety including physical limitations  - Instruct patient to call for assistance with activity   - Consult OT/PT to assist with strengthening/mobility   - Keep Call bell within reach  - Keep bed low and locked with side rails adjusted as appropriate  - Keep care items and personal belongings within reach  - Initiate and maintain comfort rounds  - Make Fall Risk Sign visible to staff  - Offer Toileting every 2 Hours, in advance of need  - Initiate/Maintain bed/chair alarm  - Apply yellow socks and bracelet for high fall risk patients  - Consider moving patient to room near nurses station  Outcome: Progressing     Problem: GASTROINTESTINAL - ADULT  Goal: Minimal or absence of nausea and/or vomiting  Description: INTERVENTIONS:  - Administer IV fluids if ordered to ensure adequate hydration  - Maintain NPO status until nausea and vomiting are resolved  - Nasogastric tube if ordered  - Administer ordered antiemetic medications as needed  - Provide nonpharmacologic comfort measures as appropriate  - Advance diet as tolerated, if ordered  - Consider nutrition services referral to assist patient with adequate nutrition and appropriate food choices  Outcome: Progressing

## 2022-04-20 NOTE — PROGRESS NOTES
[address]  Progress Note - Migel Bowser 1/19/1929, 80 y o  female MRN: [id number]  Unit/Bed#: ICU 09-01 Encounter: [id number]  Primary Care Provider: Antoni Hernandez MD   Date and time admitted to hospital: 4/18/2022 10:19 PM    Elevated troponin level not due myocardial infarction  Assessment & Plan  Non-MI trop elevation in the setting of sepsis, anemia  Without evidence of cardiac chest pain      Hypoxia  Assessment & Plan  Likely related to pneumonia, anemia, and finding of ILD on imaging    Acute cystitis without hematuria  Assessment & Plan  Antibiotics per Medicine    Upper GI bleed  Assessment & Plan  Hemoglobin 7 6 on admission  Improved to 9 3 following transfusion  No evidence of bleeding; no plans for endoscopy    Right upper lobe pneumonia  Assessment & Plan  Likely contributing to reports of shortness of breath  Antibiotics and management per medical team    Other hyperlipidemia  Assessment & Plan  Continue Pravachol    Primary hypertension  Assessment & Plan  Blood pressure is well controlled  Continue losartan 50 mg daily (on Avapro at home) amlodipine 10 mg daily    * Severe sepsis [facility])  Assessment & Plan  Management per medical team      Outpatient Cardiologist: none      Subjective:   Patient seen and examined  No significant events overnight  She is OOB in the chair, feeling a little better this AM   Denies any chest pain, pressure  Summary comments:   Barbara Montilla was admitted to the hospital with shortness of breath found to be septic with pneumonia as the suspected source  She was also found to be anemic and received a blood transfusion  In this setting, troponin levels were elevated  No significant EKG changes were found and she has remained asymptomatic  Echocardiogram showed a very small area of hypokinesia, acuity unknown  The results of the echocardiogram were reviewed with Barbara Montilla and her family at the bedside    Given the absence of symptoms, she is not interested in pursuing additional workup  Given her age and other comorbidities, this is a reasonable approach  Cardiology will sign off and remain available if needed  Telemetry/ECG/Cardiac testing:   Echo 04/19/2022 EF 55% with mild-to-moderate LVH  Mild hypokinesia of the apex  Moderate AS, MR    Vitals: Blood pressure 127/57, pulse 86, temperature (!) 96 5 °F (35 8 °C), temperature source Tympanic, resp  rate (!) 23, height 5' 4" (1 626 m), weight 81 kg (178 lb 9 2 oz), SpO2 98 % ,   Orthostatic Blood Pressures      Most Recent Value   Blood Pressure 127/57 filed at 04/20/2022 0800   Patient Position - Orthostatic VS Lying filed at 04/20/2022 0800      ,   Weight (last 2 days)     Date/Time Weight    04/20/22 0400 81 (178 57)    04/19/22 1440 79 1 (174 38)    04/19/22 1400 80 7 (178)    04/18/22 2224 81 (178 57)          Physical Exam:    General:  Normal appearance in no distress  Eyes:  Anicteric  Oral mucosa:  Moist   Neck:  No JVD  Carotid upstrokes are brisk without bruits  No masses  Chest:  Bibasilar rales  Cardiac:  No palpable PMI  Normal S1 and S2   9-3/3 systolic murmur  Abdomen:  Soft and nontender  No palpable organomegaly or aortic enlargement  Extremities:  No peripheral edema  Neuro:  Grossly symmetric  Psych:  Alert and oriented x3        Medications:      Current Facility-Administered Medications:     acetaminophen (TYLENOL) tablet 650 mg, 650 mg, Oral, Q6H PRN, Brinda Beltran PA-C    cefTRIAXone (ROCEPHIN) IVPB (premix in dextrose) 1,000 mg 50 mL, 1,000 mg, Intravenous, Q24H, Brinda Beltran PA-C, Stopped at 04/20/22 0700    dextromethorphan-guaiFENesin (ROBITUSSIN DM) oral syrup 10 mL, 10 mL, Oral, Q4H PRN, Brinda Beltran PA-C    insulin aspart protamine-insulin aspart (NovoLOG 70/30) 100 units/mL subcutaneous injection 30 Units, 30 Units, Subcutaneous, BID AC, [place], DO, 30 Units at 04/20/22 0758    insulin lispro (HumaLOG) 100 units/mL subcutaneous injection 1-6 Units, 1-6 Units, Subcutaneous, TID AC, 2 Units at 04/20/22 0758 **AND** Fingerstick Glucose (POCT), , , TID AC, Tyrel Merino DO    insulin lispro (HumaLOG) 100 units/mL subcutaneous injection 1-6 Units, 1-6 Units, Subcutaneous, HS, Tyrel Merino DO, 3 Units at 04/19/22 2202    iron sucrose (VENOFER) 200 mg in sodium chloride 0 9 % 100 mL IVPB, 200 mg, Intravenous, Once, Tyrel Merino DO, 200 mg at 04/20/22 1014    levothyroxine tablet 125 mcg, 125 mcg, Oral, Daily, [place], PA-C, 125 mcg at 04/20/22 0831    ondansetron (ZOFRAN) injection 4 mg, 4 mg, Intravenous, Q6H PRN, [place], PA-C    pantoprazole (PROTONIX) EC tablet 40 mg, 40 mg, Oral, Early Morning, Lizbeth Lyle DO, 40 mg at 04/20/22 0540    potassium chloride (K-DUR,KLOR-CON) CR tablet 10 mEq, 10 mEq, Oral, Daily, Virgin [place], PA-C, 10 mEq at 04/20/22 0831    pravastatin (PRAVACHOL) tablet 40 mg, 40 mg, Oral, Daily, Virgin [place], PA-C, 40 mg at 04/20/22 0831     Labs & Results:    Troponins:    Results from last 7 days   Lab Units 04/19/22  0413 04/19/22  0131 04/18/22  2301   HS TNI 0HR ng/L  --   --  138*   HS TNI 2HR ng/L  --  384*  --    HSTNI D2 ng/L  --  246*  --    HS TNI 4HR ng/L 650*  --   --    HSTNI D4 ng/L 512*  --   --         BNP:   Results from last 6 Months   Lab Units 04/18/22  2301 12/03/21  1639   BNP pg/mL 197* 81     CBC with diff:   Results from last 7 days   Lab Units 04/20/22  0545 04/19/22  2340 04/19/22  0807 04/19/22  0413   WBC Thousand/uL 11 92*  --   --  10 07   HEMOGLOBIN g/dL 8 4* 8 1*   < > 8 6*   HEMATOCRIT % 28 0*  --   --  27 7*   MCV fL 97  --   --  95   PLATELETS Thousands/uL 234  --   --  240    < > = values in this interval not displayed       TSH:     CMP:   Results from last 7 days   Lab Units 04/20/22  0545 04/19/22  1544 04/19/22  0413 04/18/22  2301   POTASSIUM mmol/L 4 6 4 2   < > 5 1   CHLORIDE mmol/L 100 97   < > 97   CO2 mmol/L 28 22   < > 23   BUN mg/dL 43* 46*   < > 42* CREATININE mg/dL 1 11 1 35*   < > 1 29   AST U/L  --   --   --  15   ALT U/L  --   --   --  9   EGFR ml/min/1 73sq m 42 33   < > 35    < > = values in this interval not displayed       Lipid Profile:     Coags:   Results from last 7 days   Lab Units 04/18/22  2310   INR  1 09

## 2022-04-20 NOTE — PROGRESS NOTES
[address]  Progress Note - Airam Morales 1/19/1929, 80 y o  female MRN: [id number]  Unit/Bed#: ICU 09-01 Encounter: [id number]  Primary Care Provider: Laura Frances MD   Date and time admitted to hospital: 4/18/2022 10:19 PM    * Severe sepsis [facility])  Assessment & Plan  · Resolved  · Blood cultures with no growth at 24 hours  · Continue Rocephin 1 g IV daily  · Patient met sepsis criteria in that she was tachycardic with a heart rate of 102, tachypnea with respiratory rate of 22, lactic acidosis of 4 5 unknown source of infection being right upper lobe pneumonia  · No need to further trend lactic acid  · Downgrade to med surg    Right upper lobe pneumonia  Assessment & Plan  · Being treated as per above  · Right upper ground-glass opacities noted on CT scan    Acute respiratory failure with hypoxia (HCC)  Assessment & Plan  · Acute respiratory failure with hypoxia 2/2 PNA  · Uses 3L NC O2 at home ; back to baseline this AM  · Wean O2 as tolerated  · Will d/c tomorrow on O2    Upper GI bleed  Assessment & Plan  · Patient has a history of bleeding ulcer  · Hgb has been stable  · Obtain H&H q 8 hours  · PO Protonix  · GI has signed off ; recommend outpatient endoscopic evaluation however patient refuses    Anemia  Assessment & Plan  · Likely secondary to upper GI bleed  · Patient is s/p 1 unit packed red blood cells  · Iron low ; will give Venofer  · Patient refusing endoscopic evaluation at this time    Acute cystitis without hematuria  Assessment & Plan  · Will give Rocephin 1 g IV daily as per above  · Urine cultures are currently pending    Hypoxia  Assessment & Plan  · Secondary to pneumonia being treated as per above  · Will place on O2 and respiratory protocol  · Patient has no known respiratory history and is on no respiratory medications    Elevated troponin level not due myocardial infarction  Assessment & Plan  · The setting of sepsis  · Will continue pre-hospital Norvasc, Cozaar and statin  · No aspirin secondary to GI bleed  · Trend cardiac enzymes  · Obtain serial EKGs  · Consult Cardiology in a m  Lactic acidosis  Assessment & Plan  · Resolved  · Initial was 4 5 with repeat of 4 1  · D/C IVFs  · Likely some component of hypoxia as patient had O2 saturation of 80% prior to arrival      Hypothyroidism  Assessment & Plan  Continue pre-hospital levothyroxine 125 mcg p o  daily    Diabetes mellitus type 2, insulin dependent [facility])  Assessment & Plan  Lab Results   Component Value Date    HGBA1C 7 2 05/06/2020       Recent Labs     04/19/22  1730 04/19/22  1829 04/19/22  2106 04/20/22  0706   POCGLU 149* 163* 234* 205*       Blood Sugar Average: Last 72 hrs:  (P) 293 3   Place on [facility] step to clear liquid diet, hold pre-hospital antihyperglycemics and obtain Accu-Cheks AC and HS with Humalog correction dose a c  and HS  Resume home Novolog 70/30 30 units BID    Other hyperlipidemia  Assessment & Plan  Continue pre-hospital Pravachol 40 mg p o  daily    Primary hypertension  Assessment & Plan  Continue pre-hospital Norvasc 10 mg p o  daily and substitute Cozaar 50 mg p o  daily for pre-hospital Avapro    VTE Pharmacologic Prophylaxis: Contraindicated 2/2 anemia    Patient Centered Rounds:  Patient care rounds were performed with nursing    Discussions with Specialists or Other Care Team Provider: Nursing    Education and Discussions with Family / Patient: Spoke with patient's daughter Jannet Cook    Time Spent for Care: 30  More than 50% of total time spent on counseling and coordination of care as described above  Current Length of Stay: 1 day(s)    Current Patient Status: Inpatient     Certification Statement: The patient will continue to require additional inpatient hospital stay due to PNA / Cystitis     [facility]    Code Status: Level 3 - DNAR and DNI      Subjective:   Patient seen and examined at bedside  No acute events overnight   Denies chest pain, SOB, diaphoresis, nausea/vomiting/diarrhea, fevers/chills  Objective:     Vitals:   Temp (24hrs), Av 6 °F (36 4 °C), Min:96 5 °F (35 8 °C), Max:98 6 °F (37 °C)    Temp:  [96 5 °F (35 8 °C)-98 6 °F (37 °C)] 96 5 °F (35 8 °C)  HR:  [] 93  Resp:  [17-28] 17  BP: ()/(54-59) 107/58  SpO2:  [93 %-98 %] 94 %  Body mass index is 30 65 kg/m²  Input and Output Summary (last 24 hours): Intake/Output Summary (Last 24 hours) at 2022 0848  Last data filed at 2022 2250  Gross per 24 hour   Intake 1712 08 ml   Output 950 ml   Net 762 08 ml       Physical Exam:     Physical Exam  Vitals and nursing note reviewed  Constitutional:       General: She is not in acute distress  Appearance: She is well-developed  HENT:      Head: Normocephalic and atraumatic  Eyes:      Conjunctiva/sclera: Conjunctivae normal    Cardiovascular:      Rate and Rhythm: Normal rate and regular rhythm  Heart sounds: No murmur heard  Pulmonary:      Effort: Pulmonary effort is normal  No respiratory distress  Breath sounds: Normal breath sounds  Abdominal:      Palpations: Abdomen is soft  Tenderness: There is no abdominal tenderness  Musculoskeletal:      Cervical back: Neck supple  Skin:     General: Skin is warm and dry  Neurological:      Mental Status: She is alert  Additional Data:     Labs: I have reviewed pertinent results     Results from last 7 days   Lab Units 22  0545 22  0807 22  0413   WBC Thousand/uL 11 92*   < > 10 07   HEMOGLOBIN g/dL 8 4*   < > 8 6*   HEMATOCRIT % 28 0*   < > 27 7*   PLATELETS Thousands/uL 234   < > 240   BANDS PCT %  --   --  2   NEUTROS PCT % 80*  --   --    LYMPHS PCT % 11*  --   --    LYMPHO PCT %  --   --  4*   MONOS PCT % 8  --   --    MONO PCT %  --   --  1*   EOS PCT % 0  --  0    < > = values in this interval not displayed       Results from last 7 days   Lab Units 22  0545 22  0413 22  3029 SODIUM mmol/L 135   < > 131*   POTASSIUM mmol/L 4 6   < > 5 1   CHLORIDE mmol/L 100   < > 97   CO2 mmol/L 28   < > 23   BUN mg/dL 43*   < > 42*   CREATININE mg/dL 1 11   < > 1 29   ANION GAP mmol/L 7   < > 11   CALCIUM mg/dL 8 9   < > 9 4   ALBUMIN g/dL  --   --  3 8   TOTAL BILIRUBIN mg/dL  --   --  0 58   ALK PHOS U/L  --   --  50   ALT U/L  --   --  9   AST U/L  --   --  15   GLUCOSE RANDOM mg/dL 207*   < > 295*    < > = values in this interval not displayed  Results from last 7 days   Lab Units 04/18/22  2310   INR  1 09     Results from last 7 days   Lab Units 04/20/22  0706 04/19/22  2106 04/19/22  1829 04/19/22  1730 04/19/22  1638 04/19/22  1532 04/19/22  1443 04/19/22  1325 04/19/22  1232 04/19/22  1126 04/19/22  0942 04/19/22  0938   POC GLUCOSE mg/dl 205* 234* 163* 149* 226* 339* 373* 348* 439* 457* >500* >500*         Results from last 7 days   Lab Units 04/20/22  0545 04/19/22  0131 04/18/22  2310 04/18/22  2301   LACTIC ACID mmol/L  --  4 1*  --  4 5*   PROCALCITONIN ng/ml 0 09  --  0 10  --          Imaging: I have reviewed pertinent imaging       Recent Cultures (last 7 days):     Results from last 7 days   Lab Units 04/19/22  0217 04/18/22  2310 04/18/22  2301   BLOOD CULTURE   --  No Growth at 24 hrs  No Growth at 24 hrs     LEGIONELLA URINARY ANTIGEN  Negative  --   --        Last 24 Hours Medication List:   Current Facility-Administered Medications   Medication Dose Route Frequency Provider Last Rate    acetaminophen  650 mg Oral Q6H PRN Nurys Moore PA-C      cefTRIAXone  1,000 mg Intravenous Q24H Nurys Moore PA-C Stopped (04/20/22 0700)   Isela Brown dextromethorphan-guaiFENesin  10 mL Oral Q4H PRN Nurys Moore PA-C      insulin aspart protamine-insulin aspart  30 Units Subcutaneous BID AC Johny Rang, DO      insulin lispro  1-6 Units Subcutaneous TID AC Johny Rang, DO      insulin lispro  1-6 Units Subcutaneous HS Johny Rang, DO      iron sucrose  200 mg Intravenous Once Flor Wesley,       levothyroxine  125 mcg Oral Daily Rochell Duverney, PA-C      ondansetron  4 mg Intravenous Q6H PRN Rochell Duverney, PA-C      pantoprazole  40 mg Oral Early Morning Lizbeth Lyle,       potassium chloride  10 mEq Oral Daily Rochell Duverney, PA-C      pravastatin  40 mg Oral Daily Rochell Duverney, PA-C          Today, Patient Was Seen By: Flor Wesley DO    ** Please Note: Dictation voice to text software may have been used in the creation of this document   **

## 2022-04-20 NOTE — ASSESSMENT & PLAN NOTE
· Resolved  · Initial was 4 5 with repeat of 4 1  · D/C IVFs  · Likely some component of hypoxia as patient had O2 saturation of 80% prior to arrival

## 2022-04-20 NOTE — PLAN OF CARE
Problem: Potential for Falls  Goal: Patient will remain free of falls  Description: INTERVENTIONS:  - Educate patient/family on patient safety including physical limitations  - Instruct patient to call for assistance with activity   - Consult OT/PT to assist with strengthening/mobility   - Keep Call bell within reach  - Keep bed low and locked with side rails adjusted as appropriate  - Keep care items and personal belongings within reach  - Initiate and maintain comfort rounds  - Make Fall Risk Sign visible to staff  - Offer Toileting every 2 Hours, in advance of need  - Initiate/Maintain bed/chair alarm  - Apply yellow socks and bracelet for high fall risk patients  - Consider moving patient to room near nurses station  Outcome: Progressing     Problem: GASTROINTESTINAL - ADULT  Goal: Minimal or absence of nausea and/or vomiting  Description: INTERVENTIONS:  - Administer IV fluids if ordered to ensure adequate hydration  - Maintain NPO status until nausea and vomiting are resolved  - Nasogastric tube if ordered  - Administer ordered antiemetic medications as needed  - Provide nonpharmacologic comfort measures as appropriate  - Advance diet as tolerated, if ordered  - Consider nutrition services referral to assist patient with adequate nutrition and appropriate food choices  Outcome: Progressing     Problem: SKIN/TISSUE INTEGRITY - ADULT  Goal: Skin Integrity remains intact(Skin Breakdown Prevention)  Description: Assess:  -Perform Tonny assessment every shift  -Clean and moisturize skin every shift   -Inspect skin when repositioning, toileting, and assisting with ADLS  -Assess extremities for adequate circulation and sensation     Bed Management:  -Have minimal linens on bed & keep smooth, unwrinkled  -Change linens as needed when moist or perspiring  -Avoid sitting or lying in one position for more than 2 hours while in bed  -Keep HOB at 30 degrees     Toileting:  -Offer bedside commode  -Use incontinent care products after each incontinent     Activity:  -Mobilize patient 3 times a day  -Encourage activity and walks on unit  -Encourage or provide ROM exercises   -Turn and reposition patient every 2 Hours  -Use appropriate equipment to lift or move patient in bed    Skin Care:  -Avoid use of baby powder, tape, friction and shearing, hot water or constrictive clothing  -Do not massage red bony areas    Outcome: Progressing  Goal: Incision(s), wounds(s) or drain site(s) healing without S/S of infection  Description: INTERVENTIONS  - Assess and document dressing, incision, wound bed, drain sites and surrounding tissue  - Provide patient and family education  - Perform skin care/dressing changes every shift  Outcome: Progressing  Goal: Pressure injury heals and does not worsen  Description: Interventions:  - Implement low air loss mattress or specialty surface (Criteria met)  - Apply silicone foam dressing  - Instruct/assist with weight shifting every 60 minutes when in chair   - Apply fecal or urinary incontinence containment device   - Perform passive or active ROM every shift  - Turn and reposition patient & offload bony prominences every 2 hours   - Utilize friction reducing device or surface for transfers   - Consider nutrition services referral as needed  Outcome: Progressing     Problem: HEMATOLOGIC - ADULT  Goal: Maintains hematologic stability  Description: INTERVENTIONS  - Assess for signs and symptoms of bleeding or hemorrhage  - Monitor labs  - Administer supportive blood products/factors as ordered and appropriate  Outcome: Progressing     Problem: MUSCULOSKELETAL - ADULT  Goal: Maintain or return mobility to safest level of function  Description: INTERVENTIONS:  - Assess patient's ability to carry out ADLs; assess patient's baseline for ADL function and identify physical deficits which impact ability to perform ADLs (bathing, care of mouth/teeth, toileting, grooming, dressing, etc )  - Assess/evaluate cause of self-care deficits   - Assess range of motion  - Assess patient's mobility  - Assess patient's need for assistive devices and provide as appropriate  - Encourage maximum independence but intervene and supervise when necessary  - Involve family in performance of ADLs  - Assess for home care needs following discharge   - Consider OT consult to assist with ADL evaluation and planning for discharge  - Provide patient education as appropriate  Outcome: Progressing     Problem: PAIN - ADULT  Goal: Verbalizes/displays adequate comfort level or baseline comfort level  Description: Interventions:  - Encourage patient to monitor pain and request assistance  - Assess pain using appropriate pain scale  - Administer analgesics based on type and severity of pain and evaluate response  - Implement non-pharmacological measures as appropriate and evaluate response  - Consider cultural and social influences on pain and pain management  - Notify physician/advanced practitioner if interventions unsuccessful or patient reports new pain  Outcome: Progressing     Problem: INFECTION - ADULT  Goal: Absence or prevention of progression during hospitalization  Description: INTERVENTIONS:  - Assess and monitor for signs and symptoms of infection  - Monitor lab/diagnostic results  - Monitor all insertion sites, i e  indwelling lines, tubes, and drains  - Monitor endotracheal if appropriate and nasal secretions for changes in amount and color  - [place] appropriate cooling/warming therapies per order  - Administer medications as ordered  - Instruct and encourage patient and family to use good hand hygiene technique  - Identify and instruct in appropriate isolation precautions for identified infection/condition  Outcome: Progressing     Problem: SAFETY ADULT  Goal: Patient will remain free of falls  Description: INTERVENTIONS:  - Educate patient/family on patient safety including physical limitations  - Instruct patient to call for assistance with activity   - Consult OT/PT to assist with strengthening/mobility   - Keep Call bell within reach  - Keep bed low and locked with side rails adjusted as appropriate  - Keep care items and personal belongings within reach  - Initiate and maintain comfort rounds  - Make Fall Risk Sign visible to staff  - Offer Toileting every 2 Hours, in advance of need  - Initiate/Maintain bed/chair alarm  - Apply yellow socks and bracelet for high fall risk patients  - Consider moving patient to room near nurses station  Outcome: Progressing  Goal: Maintain or return to baseline ADL function  Description: INTERVENTIONS:  -  Assess patient's ability to carry out ADLs; assess patient's baseline for ADL function and identify physical deficits which impact ability to perform ADLs (bathing, care of mouth/teeth, toileting, grooming, dressing, etc )  - Assess/evaluate cause of self-care deficits   - Assess range of motion  - Assess patient's mobility; develop plan if impaired  - Assess patient's need for assistive devices and provide as appropriate  - Encourage maximum independence but intervene and supervise when necessary  - Involve family in performance of ADLs  - Assess for home care needs following discharge   - Consider OT consult to assist with ADL evaluation and planning for discharge  - Provide patient education as appropriate  Outcome: Progressing  Goal: Maintains/Returns to pre admission functional level  Description: INTERVENTIONS:  - Perform BMAT or MOVE assessment daily    - Set and communicate daily mobility goal to care team and patient/family/caregiver  - Collaborate with rehabilitation services on mobility goals if consulted  - Perform Range of Motion 3 times a day  - Reposition patient every 2 hours    - Dangle patient 3 times a day  - Stand patient 3 times a day  - Ambulate patient 3 times a day  - Out of bed for meals   - Out of bed for toileting  - Record patient progress and toleration of activity level Outcome: Progressing     Problem: DISCHARGE PLANNING  Goal: Discharge to home or other facility with appropriate resources  Description: INTERVENTIONS:  - Identify barriers to discharge w/patient and caregiver  - Arrange for needed discharge resources and transportation as appropriate  - Identify discharge learning needs (meds, wound care, etc )  - Arrange for interpretive services to assist at discharge as needed  - Refer to Case Management Department for coordinating discharge planning if the patient needs post-hospital services based on physician/advanced practitioner order or complex needs related to functional status, cognitive ability, or social support system  Outcome: Progressing     Problem: Knowledge Deficit  Goal: Patient/family/caregiver demonstrates understanding of disease process, treatment plan, medications, and discharge instructions  Description: Complete learning assessment and assess knowledge base    Interventions:  - Provide teaching at level of understanding  - Provide teaching via preferred learning methods  Outcome: Progressing     Problem: MOBILITY - ADULT  Goal: Maintain or return to baseline ADL function  Description: INTERVENTIONS:  -  Assess patient's ability to carry out ADLs; assess patient's baseline for ADL function and identify physical deficits which impact ability to perform ADLs (bathing, care of mouth/teeth, toileting, grooming, dressing, etc )  - Assess/evaluate cause of self-care deficits   - Assess range of motion  - Assess patient's mobility; develop plan if impaired  - Assess patient's need for assistive devices and provide as appropriate  - Encourage maximum independence but intervene and supervise when necessary  - Involve family in performance of ADLs  - Assess for home care needs following discharge   - Consider OT consult to assist with ADL evaluation and planning for discharge  - Provide patient education as appropriate  Outcome: Progressing  Goal: Maintains/Returns to pre admission functional level  Description: INTERVENTIONS:  - Perform BMAT or MOVE assessment daily    - Set and communicate daily mobility goal to care team and patient/family/caregiver  - Collaborate with rehabilitation services on mobility goals if consulted  - Perform Range of Motion 3 times a day  - Reposition patient every 2 hours    - Dangle patient 3 times a day  - Stand patient 3 times a day  - Ambulate patient 3 times a day  - Out of bed for meals   - Out of bed for toileting  - Record patient progress and toleration of activity level   Outcome: Progressing

## 2022-04-20 NOTE — ASSESSMENT & PLAN NOTE
· Patient has a history of bleeding ulcer  · Hgb has been stable  · Obtain H&H q 8 hours  · PO Protonix  · GI has signed off ; recommend outpatient endoscopic evaluation however patient refuses

## 2022-04-20 NOTE — ASSESSMENT & PLAN NOTE
· Acute respiratory failure with hypoxia 2/2 PNA  · Uses 3L NC O2 at home ; back to baseline this AM  · Wean O2 as tolerated  · Will d/c tomorrow on O2

## 2022-04-21 VITALS
TEMPERATURE: 96.8 F | DIASTOLIC BLOOD PRESSURE: 66 MMHG | HEART RATE: 83 BPM | SYSTOLIC BLOOD PRESSURE: 143 MMHG | RESPIRATION RATE: 18 BRPM | BODY MASS INDEX: 29.55 KG/M2 | OXYGEN SATURATION: 99 % | WEIGHT: 173.06 LBS | HEIGHT: 64 IN

## 2022-04-21 LAB
ANION GAP SERPL CALCULATED.3IONS-SCNC: 5 MMOL/L (ref 4–13)
BASOPHILS # BLD AUTO: 0.04 THOUSANDS/ΜL (ref 0–0.1)
BASOPHILS NFR BLD AUTO: 0 % (ref 0–1)
BUN SERPL-MCNC: 33 MG/DL (ref 5–25)
CALCIUM SERPL-MCNC: 9.1 MG/DL (ref 8.4–10.2)
CHLORIDE SERPL-SCNC: 103 MMOL/L (ref 96–108)
CO2 SERPL-SCNC: 28 MMOL/L (ref 21–32)
CREAT SERPL-MCNC: 0.92 MG/DL (ref 0.6–1.3)
EOSINOPHIL # BLD AUTO: 0.17 THOUSAND/ΜL (ref 0–0.61)
EOSINOPHIL NFR BLD AUTO: 2 % (ref 0–6)
ERYTHROCYTE [DISTWIDTH] IN BLOOD BY AUTOMATED COUNT: 17 % (ref 11.6–15.1)
GFR SERPL CREATININE-BSD FRML MDRD: 53 ML/MIN/1.73SQ M
GLUCOSE SERPL-MCNC: 135 MG/DL (ref 65–140)
GLUCOSE SERPL-MCNC: 193 MG/DL (ref 65–140)
HCT VFR BLD AUTO: 28.4 % (ref 34.8–46.1)
HGB BLD-MCNC: 8.5 G/DL (ref 11.5–15.4)
IMM GRANULOCYTES # BLD AUTO: 0.08 THOUSAND/UL (ref 0–0.2)
IMM GRANULOCYTES NFR BLD AUTO: 1 % (ref 0–2)
LYMPHOCYTES # BLD AUTO: 1.75 THOUSANDS/ΜL (ref 0.6–4.47)
LYMPHOCYTES NFR BLD AUTO: 20 % (ref 14–44)
MAGNESIUM SERPL-MCNC: 2 MG/DL (ref 1.9–2.7)
MCH RBC QN AUTO: 29.8 PG (ref 26.8–34.3)
MCHC RBC AUTO-ENTMCNC: 29.9 G/DL (ref 31.4–37.4)
MCV RBC AUTO: 100 FL (ref 82–98)
MONOCYTES # BLD AUTO: 0.77 THOUSAND/ΜL (ref 0.17–1.22)
MONOCYTES NFR BLD AUTO: 9 % (ref 4–12)
MRSA NOSE QL CULT: NORMAL
NEUTROPHILS # BLD AUTO: 6.11 THOUSANDS/ΜL (ref 1.85–7.62)
NEUTS SEG NFR BLD AUTO: 68 % (ref 43–75)
NRBC BLD AUTO-RTO: 0 /100 WBCS
PHOSPHATE SERPL-MCNC: 3.3 MG/DL (ref 2.3–4.1)
PLATELET # BLD AUTO: 247 THOUSANDS/UL (ref 149–390)
PMV BLD AUTO: 10.4 FL (ref 8.9–12.7)
POTASSIUM SERPL-SCNC: 4.4 MMOL/L (ref 3.5–5.3)
RBC # BLD AUTO: 2.85 MILLION/UL (ref 3.81–5.12)
SODIUM SERPL-SCNC: 136 MMOL/L (ref 135–147)
WBC # BLD AUTO: 8.92 THOUSAND/UL (ref 4.31–10.16)

## 2022-04-21 PROCEDURE — 80048 BASIC METABOLIC PNL TOTAL CA: CPT | Performed by: INTERNAL MEDICINE

## 2022-04-21 PROCEDURE — 99239 HOSP IP/OBS DSCHRG MGMT >30: CPT | Performed by: INTERNAL MEDICINE

## 2022-04-21 PROCEDURE — 84100 ASSAY OF PHOSPHORUS: CPT | Performed by: INTERNAL MEDICINE

## 2022-04-21 PROCEDURE — 85025 COMPLETE CBC W/AUTO DIFF WBC: CPT | Performed by: INTERNAL MEDICINE

## 2022-04-21 PROCEDURE — 83735 ASSAY OF MAGNESIUM: CPT | Performed by: INTERNAL MEDICINE

## 2022-04-21 PROCEDURE — 82948 REAGENT STRIP/BLOOD GLUCOSE: CPT

## 2022-04-21 RX ORDER — AMOXICILLIN AND CLAVULANATE POTASSIUM 875; 125 MG/1; MG/1
1 TABLET, FILM COATED ORAL EVERY 12 HOURS SCHEDULED
Qty: 10 TABLET | Refills: 0 | Status: SHIPPED | OUTPATIENT
Start: 2022-04-21 | End: 2022-04-28 | Stop reason: HOSPADM

## 2022-04-21 RX ORDER — LANOLIN ALCOHOL/MO/W.PET/CERES
6 CREAM (GRAM) TOPICAL
Status: DISCONTINUED | OUTPATIENT
Start: 2022-04-21 | End: 2022-04-21 | Stop reason: HOSPADM

## 2022-04-21 RX ADMIN — INSULIN ASPART 30 UNITS: 100 INJECTION, SUSPENSION SUBCUTANEOUS at 08:03

## 2022-04-21 RX ADMIN — POTASSIUM CHLORIDE 10 MEQ: 750 TABLET, EXTENDED RELEASE ORAL at 08:04

## 2022-04-21 RX ADMIN — LEVOTHYROXINE SODIUM 125 MCG: 25 TABLET ORAL at 08:04

## 2022-04-21 RX ADMIN — Medication 6 MG: at 00:16

## 2022-04-21 RX ADMIN — CEFTRIAXONE 1000 MG: 1 INJECTION, SOLUTION INTRAVENOUS at 04:56

## 2022-04-21 RX ADMIN — INSULIN LISPRO 2 UNITS: 100 INJECTION, SOLUTION INTRAVENOUS; SUBCUTANEOUS at 08:03

## 2022-04-21 RX ADMIN — PANTOPRAZOLE SODIUM 40 MG: 40 TABLET, DELAYED RELEASE ORAL at 04:56

## 2022-04-21 RX ADMIN — PRAVASTATIN SODIUM 40 MG: 20 TABLET ORAL at 08:04

## 2022-04-21 NOTE — ASSESSMENT & PLAN NOTE
· Acute respiratory failure with hypoxia 2/2 PNA  · Uses 3L NC O2 at home ; back to baseline this AM  · Wean O2 as tolerated  · Will d/c today on O2

## 2022-04-21 NOTE — ASSESSMENT & PLAN NOTE
Lab Results   Component Value Date    HGBA1C 7 2 05/06/2020       Recent Labs     04/20/22  1114 04/20/22  1635 04/20/22  2100 04/21/22  0721   POCGLU 234* 244* 229* 193*       Blood Sugar Average: Last 72 hrs:  (P) 345 [id number]   Place on [facility] step to clear liquid diet, hold pre-hospital antihyperglycemics and obtain Accu-Cheks AC and HS with Humalog correction dose a c  and HS  Resume home Novolog 70/30 30 units BID

## 2022-04-21 NOTE — PLAN OF CARE
Problem: Potential for Falls  Goal: Patient will remain free of falls  Description: INTERVENTIONS:  - Educate patient/family on patient safety including physical limitations  - Instruct patient to call for assistance with activity   - Consult OT/PT to assist with strengthening/mobility   - Keep Call bell within reach  - Keep bed low and locked with side rails adjusted as appropriate  - Keep care items and personal belongings within reach  - Initiate and maintain comfort rounds  - Make Fall Risk Sign visible to staff  - Offer Toileting every 2 Hours, in advance of need  - Initiate/Maintain bed/chair alarm  - Apply yellow socks and bracelet for high fall risk patients  - Consider moving patient to room near nurses station  Outcome: Completed     Problem: GASTROINTESTINAL - ADULT  Goal: Minimal or absence of nausea and/or vomiting  Description: INTERVENTIONS:  - Administer IV fluids if ordered to ensure adequate hydration  - Maintain NPO status until nausea and vomiting are resolved  - Nasogastric tube if ordered  - Administer ordered antiemetic medications as needed  - Provide nonpharmacologic comfort measures as appropriate  - Advance diet as tolerated, if ordered  - Consider nutrition services referral to assist patient with adequate nutrition and appropriate food choices  Outcome: Completed     Problem: SKIN/TISSUE INTEGRITY - ADULT  Goal: Skin Integrity remains intact(Skin Breakdown Prevention)  Description: Assess:  -Perform Tonny assessment every shift  -Clean and moisturize skin every shift   -Inspect skin when repositioning, toileting, and assisting with ADLS  -Assess extremities for adequate circulation and sensation     Bed Management:  -Have minimal linens on bed & keep smooth, unwrinkled  -Change linens as needed when moist or perspiring  -Avoid sitting or lying in one position for more than 2 hours while in bed  -Keep HOB at 30 degrees     Toileting:  -Offer bedside commode  -Use incontinent care products after each incontinent     Activity:  -Mobilize patient 3 times a day  -Encourage activity and walks on unit  -Encourage or provide ROM exercises   -Turn and reposition patient every 2 Hours  -Use appropriate equipment to lift or move patient in bed    Skin Care:  -Avoid use of baby powder, tape, friction and shearing, hot water or constrictive clothing  -Do not massage red bony areas    Outcome: Completed  Goal: Incision(s), wounds(s) or drain site(s) healing without S/S of infection  Description: INTERVENTIONS  - Assess and document dressing, incision, wound bed, drain sites and surrounding tissue  - Provide patient and family education  - Perform skin care/dressing changes every shift  Outcome: Completed  Goal: Pressure injury heals and does not worsen  Description: Interventions:  - Implement low air loss mattress or specialty surface (Criteria met)  - Apply silicone foam dressing  - Instruct/assist with weight shifting every 60 minutes when in chair   - Apply fecal or urinary incontinence containment device   - Perform passive or active ROM every shift  - Turn and reposition patient & offload bony prominences every 2 hours   - Utilize friction reducing device or surface for transfers   - Consider nutrition services referral as needed  Outcome: Completed     Problem: HEMATOLOGIC - ADULT  Goal: Maintains hematologic stability  Description: INTERVENTIONS  - Assess for signs and symptoms of bleeding or hemorrhage  - Monitor labs  - Administer supportive blood products/factors as ordered and appropriate  Outcome: Completed     Problem: MUSCULOSKELETAL - ADULT  Goal: Maintain or return mobility to safest level of function  Description: INTERVENTIONS:  - Assess patient's ability to carry out ADLs; assess patient's baseline for ADL function and identify physical deficits which impact ability to perform ADLs (bathing, care of mouth/teeth, toileting, grooming, dressing, etc )  - Assess/evaluate cause of self-care deficits   - Assess range of motion  - Assess patient's mobility  - Assess patient's need for assistive devices and provide as appropriate  - Encourage maximum independence but intervene and supervise when necessary  - Involve family in performance of ADLs  - Assess for home care needs following discharge   - Consider OT consult to assist with ADL evaluation and planning for discharge  - Provide patient education as appropriate  Outcome: Completed     Problem: PAIN - ADULT  Goal: Verbalizes/displays adequate comfort level or baseline comfort level  Description: Interventions:  - Encourage patient to monitor pain and request assistance  - Assess pain using appropriate pain scale  - Administer analgesics based on type and severity of pain and evaluate response  - Implement non-pharmacological measures as appropriate and evaluate response  - Consider cultural and social influences on pain and pain management  - Notify physician/advanced practitioner if interventions unsuccessful or patient reports new pain  Outcome: Completed     Problem: INFECTION - ADULT  Goal: Absence or prevention of progression during hospitalization  Description: INTERVENTIONS:  - Assess and monitor for signs and symptoms of infection  - Monitor lab/diagnostic results  - Monitor all insertion sites, i e  indwelling lines, tubes, and drains  - Monitor endotracheal if appropriate and nasal secretions for changes in amount and color  - [place] appropriate cooling/warming therapies per order  - Administer medications as ordered  - Instruct and encourage patient and family to use good hand hygiene technique  - Identify and instruct in appropriate isolation precautions for identified infection/condition  Outcome: Completed     Problem: SAFETY ADULT  Goal: Patient will remain free of falls  Description: INTERVENTIONS:  - Educate patient/family on patient safety including physical limitations  - Instruct patient to call for assistance with activity   - Consult OT/PT to assist with strengthening/mobility   - Keep Call bell within reach  - Keep bed low and locked with side rails adjusted as appropriate  - Keep care items and personal belongings within reach  - Initiate and maintain comfort rounds  - Make Fall Risk Sign visible to staff  - Offer Toileting every 2 Hours, in advance of need  - Initiate/Maintain bed/chair alarm  - Apply yellow socks and bracelet for high fall risk patients  - Consider moving patient to room near nurses station  Outcome: Completed  Goal: Maintain or return to baseline ADL function  Description: INTERVENTIONS:  -  Assess patient's ability to carry out ADLs; assess patient's baseline for ADL function and identify physical deficits which impact ability to perform ADLs (bathing, care of mouth/teeth, toileting, grooming, dressing, etc )  - Assess/evaluate cause of self-care deficits   - Assess range of motion  - Assess patient's mobility; develop plan if impaired  - Assess patient's need for assistive devices and provide as appropriate  - Encourage maximum independence but intervene and supervise when necessary  - Involve family in performance of ADLs  - Assess for home care needs following discharge   - Consider OT consult to assist with ADL evaluation and planning for discharge  - Provide patient education as appropriate  Outcome: Completed  Goal: Maintains/Returns to pre admission functional level  Description: INTERVENTIONS:  - Perform BMAT or MOVE assessment daily    - Set and communicate daily mobility goal to care team and patient/family/caregiver  - Collaborate with rehabilitation services on mobility goals if consulted  - Perform Range of Motion 3 times a day  - Reposition patient every 2 hours    - Dangle patient 3 times a day  - Stand patient 3 times a day  - Ambulate patient 3 times a day  - Out of bed for meals   - Out of bed for toileting  - Record patient progress and toleration of activity level   Outcome: Completed     Problem: DISCHARGE PLANNING  Goal: Discharge to home or other facility with appropriate resources  Description: INTERVENTIONS:  - Identify barriers to discharge w/patient and caregiver  - Arrange for needed discharge resources and transportation as appropriate  - Identify discharge learning needs (meds, wound care, etc )  - Arrange for interpretive services to assist at discharge as needed  - Refer to Case Management Department for coordinating discharge planning if the patient needs post-hospital services based on physician/advanced practitioner order or complex needs related to functional status, cognitive ability, or social support system  Outcome: Completed     Problem: Knowledge Deficit  Goal: Patient/family/caregiver demonstrates understanding of disease process, treatment plan, medications, and discharge instructions  Description: Complete learning assessment and assess knowledge base    Interventions:  - Provide teaching at level of understanding  - Provide teaching via preferred learning methods  Outcome: Completed     Problem: MOBILITY - ADULT  Goal: Maintain or return to baseline ADL function  Description: INTERVENTIONS:  -  Assess patient's ability to carry out ADLs; assess patient's baseline for ADL function and identify physical deficits which impact ability to perform ADLs (bathing, care of mouth/teeth, toileting, grooming, dressing, etc )  - Assess/evaluate cause of self-care deficits   - Assess range of motion  - Assess patient's mobility; develop plan if impaired  - Assess patient's need for assistive devices and provide as appropriate  - Encourage maximum independence but intervene and supervise when necessary  - Involve family in performance of ADLs  - Assess for home care needs following discharge   - Consider OT consult to assist with ADL evaluation and planning for discharge  - Provide patient education as appropriate  Outcome: Completed  Goal: Maintains/Returns to pre admission functional level  Description: INTERVENTIONS:  - Perform BMAT or MOVE assessment daily    - Set and communicate daily mobility goal to care team and patient/family/caregiver  - Collaborate with rehabilitation services on mobility goals if consulted  - Perform Range of Motion 3 times a day  - Reposition patient every 2 hours    - Dangle patient 3 times a day  - Stand patient 3 times a day  - Ambulate patient 3 times a day  - Out of bed for meals   - Out of bed for toileting  - Record patient progress and toleration of activity level   Outcome: Completed

## 2022-04-21 NOTE — ASSESSMENT & PLAN NOTE
· The setting of sepsis  · Will continue pre-hospital Norvasc, Cozaar and statin  · No aspirin secondary to GI bleed  · No need to further trend cardiac enzymes  · Obtain serial EKGs  · Cardiologist signed off

## 2022-04-21 NOTE — DISCHARGE SUMMARY
Veronica 45  Discharge- Florence Koch 1/19/1929, 80 y o  female MRN: [id number]  Unit/Bed#: ICU 09-01 Encounter: [id number]  Primary Care Provider: Anny Sen MD   Date and time admitted to hospital: 4/18/2022 10:19 PM    * Severe sepsis [facility])  Assessment & Plan  · Resolved  · Blood cultures with no growth at 24 hours  · Continue Rocephin 1 g IV daily  · Patient met sepsis criteria in that she was tachycardic with a heart rate of 102, tachypnea with respiratory rate of 22, lactic acidosis of 4 5 unknown source of infection being right upper lobe pneumonia  · No need to further trend lactic acid  · Downgrade to med surg    Right upper lobe pneumonia  Assessment & Plan  · Being treated as per above  · Right upper ground-glass opacities noted on CT scan    Acute respiratory failure with hypoxia (HCC)  Assessment & Plan  · Acute respiratory failure with hypoxia 2/2 PNA  · Uses 3L NC O2 at home ; back to baseline this AM  · Wean O2 as tolerated  · Will d/c today on O2    Upper GI bleed  Assessment & Plan  · Patient has a history of bleeding ulcer  · Hgb has been stable  · Obtain H&H q 8 hours  · PO Protonix  · GI has signed off ; recommend outpatient endoscopic evaluation however patient refuses    Anemia  Assessment & Plan  · Likely secondary to upper GI bleed  · Patient is s/p 1 unit packed red blood cells  · Iron low ; will give Venofer  · Patient refusing endoscopic evaluation at this time  · Hemoglobin stable at 8 5 g/dL    Acute cystitis without hematuria  Assessment & Plan  · Will give Rocephin 1 g IV daily as per above  · Urine cultures are currently pending    Hypoxia  Assessment & Plan  · Secondary to pneumonia being treated as per above  · Will place on O2 and respiratory protocol  · Patient has no known respiratory history and is on no respiratory medications    Elevated troponin level not due myocardial infarction  Assessment & Plan  · The setting of sepsis  · Will continue pre-hospital Norvasc, Cozaar and statin  · No aspirin secondary to GI bleed  · No need to further trend cardiac enzymes  · Obtain serial EKGs  · Cardiologist signed off    Lactic acidosis  Assessment & Plan  · Resolved  · Initial was 4 5 with repeat of 4 1  · D/C IVFs  · Likely some component of hypoxia as patient had O2 saturation of 80% prior to arrival      Hypothyroidism  Assessment & Plan  Continue pre-hospital levothyroxine 125 mcg p o  daily    Diabetes mellitus type 2, insulin dependent [facility])  Assessment & Plan  Lab Results   Component Value Date    HGBA1C 7 2 05/06/2020       Recent Labs     04/20/22  1114 04/20/22  1635 04/20/22  2100 04/21/22  0721   POCGLU 234* 244* 229* 193*       Blood Sugar Average: Last 72 hrs:  (P) 734 [id number]   Place on [facility] step to clear liquid diet, hold pre-hospital antihyperglycemics and obtain Accu-Cheks AC and HS with Humalog correction dose a c  and HS  Resume home Novolog 70/30 30 units BID    Other hyperlipidemia  Assessment & Plan  Continue pre-hospital Pravachol 40 mg p o  daily    Primary hypertension  Assessment & Plan  Continue pre-hospital Norvasc 10 mg p o  daily and substitute Cozaar 50 mg p o  daily for pre-hospital Avapro      Discharging Physician / Practitioner: Parish Simons DO  PCP: Sancho Walls MD  Admission Date:   Admission Orders (From admission, onward)     Ordered        04/19/22 0058  INPATIENT ADMISSION  Once                      Discharge Date: 04/21/22    Medical Problems             Resolved Problems  Date Reviewed: 4/21/2022    None                Consultations During Hospital Stay:  Cardiology    Procedures Performed:  Not applicable    Significant Findings / Test Results:     CT chest abdomen pelvis wo contrast    Result Date: 4/19/2022  Impression: Increased right upper lobe groundglass airspace disease which may represent pneumonia  Stable findings of interstitial lung disease compatible with probable UIP versus NSIP   Superior endplate compression fracture of the T12 vertebral body with minimal loss of vertebral body height  Workstation performed: [id number]     XR chest 1 view portable    Result Date: 4/19/2022  Impression: Bilateral pulmonary opacities suggesting multilobar pneumonia  Underlying chronic interstitial lung disease  Workstation performed: [id number]       Incidental Findings:  Not applicable    Test Results Pending at Discharge (will require follow up): Not applicable     Outpatient Tests Requested:  Not applicable    Reason for Admission:  Sepsis    Hospital Course:     Aga Farley is a 80 y o  female who presents with shortness of breath x1 day  Patient presents ER for further evaluation treatment 1 day history of shortness of breath  Patient lives at a skilled nursing facility approximately 9:00 p m  Last evening she reported the staff that she was increasingly short of breath  Patient was placed on oxygen 2 L nasal cannula and when EMS arrived patient's O2 saturations were found to be in the low 80s  Patient denies any fever chills, no cough, no chest pain, palpitations, lightheadedness or dizziness  Patient is otherwise without complaint other than her shortness of breath      While in the ER patient was found to be anemic and on rectal exam by ER staff she was heme-positive the patient denies any bright red blood per rectum or dark stools  She does state that she has a history of a bleeding ulcer long time ago but is on no medications for same and is unsure when she last had an upper endoscopy and does not follow with GI  The patient improved from an infection standpoint with resolution of leukocytosis  She has remained hemodynamically stable and saturating well on her baseline O2 requirement which is 3 L nasal cannula  Hemoglobin has remained stable  Patient likely has slow GI bleeding for which she refuses endoscopic evaluation in the setting of advanced age   The patient currently lives at [facility] where she will return today  Family will provide transportation home and state that they will also bring her oxygen tank for transportation  A prescription for Augmentin x5 days was provided to the patient in the setting of acute cystitis and right upper lobe pneumonia  She was strongly encouraged to obtain blood work at her current living facility within the next 3-5 days  She was discharged in stable condition on 04/21/2022  Condition at Discharge: stable     Discharge Day Visit / Exam:     Subjective: Patient seen and examined at bedside  No acute events overnight  Denies chest pain, SOB, diaphoresis, nausea/vomiting/diarrhea, fevers/chills  Vitals: Blood Pressure: 139/62 (04/21/22 0400)  Pulse: 77 (04/21/22 0400)  Temperature: (!) 96 8 °F (36 °C) (04/21/22 0700)  Temp Source: Tympanic (04/21/22 0700)  Respirations: (!) 11 (04/21/22 0400)  Height: 5' 4" (162 6 cm) (04/19/22 1440)  Weight - Scale: 78 5 kg (173 lb 1 oz) (04/21/22 0400)  SpO2: 98 % (04/21/22 0400)     Exam:   Physical Exam  Vitals and nursing note reviewed  Constitutional:       General: She is not in acute distress  Appearance: She is well-developed  HENT:      Head: Normocephalic and atraumatic  Eyes:      Conjunctiva/sclera: Conjunctivae normal    Cardiovascular:      Rate and Rhythm: Normal rate and regular rhythm  Heart sounds: No murmur heard  Pulmonary:      Effort: Pulmonary effort is normal  No respiratory distress  Breath sounds: Normal breath sounds  Abdominal:      Palpations: Abdomen is soft  Tenderness: There is no abdominal tenderness  Musculoskeletal:      Cervical back: Neck supple  Skin:     General: Skin is warm and dry  Neurological:      Mental Status: She is alert  Discharge instructions/Information to patient and family:   See after visit summary for information provided to patient and family        Provisions for Follow-Up Care:  See after visit summary for information related to follow-up care and any pertinent home health orders  Disposition:     Return to [facility] x5 days  Outpatient laboratory analysis  Follow-up with PCP     Discharge Statement:  I spent 60 minutes discharging the patient  This time was spent on the day of discharge  I had direct contact with the patient on the day of discharge  Greater than 50% of the total time was spent examining patient, answering all patient questions, arranging and discussing plan of care with patient as well as directly providing post-discharge instructions  Additional time then spent on discharge activities  Discharge Medications:  See after visit summary for reconciled discharge medications provided to patient and family        ** Please Note: This note has been constructed using a voice recognition system **

## 2022-04-21 NOTE — NURSING NOTE
Pt discharged back to [facility]  Discharge instruction printed and discusses with pt and family  Discharge instructions reviewed with Hoang Francis RN at the receiving facility  Pt transported off unit by wheelchair with all belongings

## 2022-04-21 NOTE — CONSULTS
The patients Vancomycin therapy has been completed / discontinued  Thank you for this consult; Pharmacy will sign-off now      [address] [place] [address]

## 2022-04-21 NOTE — ASSESSMENT & PLAN NOTE
· Likely secondary to upper GI bleed  · Patient is s/p 1 unit packed red blood cells  · Iron low ; will give Venofer  · Patient refusing endoscopic evaluation at this time  · Hemoglobin stable at 8 5 g/dL

## 2022-04-21 NOTE — ASSESSMENT & PLAN NOTE
· Resolved  · Blood cultures with no growth at 24 hours  · Continue Rocephin 1 g IV daily  · Patient met sepsis criteria in that she was tachycardic with a heart rate of 102, tachypnea with respiratory rate of 22, lactic acidosis of 4 5 unknown source of infection being right upper lobe pneumonia  · No need to further trend lactic acid  · Downgrade to med surg

## 2022-04-23 ENCOUNTER — APPOINTMENT (EMERGENCY)
Dept: RADIOLOGY | Facility: HOSPITAL | Age: 87
DRG: 377 | End: 2022-04-23
Payer: MEDICARE

## 2022-04-23 ENCOUNTER — APPOINTMENT (EMERGENCY)
Dept: CT IMAGING | Facility: HOSPITAL | Age: 87
DRG: 377 | End: 2022-04-23
Payer: MEDICARE

## 2022-04-23 ENCOUNTER — HOSPITAL ENCOUNTER (INPATIENT)
Facility: HOSPITAL | Age: 87
LOS: 5 days | Discharge: HOSPICE | DRG: 377 | End: 2022-04-28
Attending: EMERGENCY MEDICINE | Admitting: ANESTHESIOLOGY
Payer: MEDICARE

## 2022-04-23 DIAGNOSIS — K92.2 UPPER GI BLEED: ICD-10-CM

## 2022-04-23 DIAGNOSIS — R57.9 SHOCK (HCC): Primary | ICD-10-CM

## 2022-04-23 DIAGNOSIS — D64.9 ANEMIA: ICD-10-CM

## 2022-04-23 DIAGNOSIS — I95.9 HYPOTENSION: ICD-10-CM

## 2022-04-23 PROBLEM — E87.1 HYPONATREMIA: Status: ACTIVE | Noted: 2022-04-23

## 2022-04-23 PROBLEM — G93.40 ENCEPHALOPATHY: Status: ACTIVE | Noted: 2022-04-23

## 2022-04-23 PROBLEM — K74.60 CIRRHOSIS (HCC): Status: ACTIVE | Noted: 2022-04-23

## 2022-04-23 PROBLEM — J96.10 CHRONIC RESPIRATORY FAILURE (HCC): Status: ACTIVE | Noted: 2022-04-23

## 2022-04-23 PROBLEM — J96.10 CHRONIC RESPIRATORY FAILURE (HCC): Chronic | Status: ACTIVE | Noted: 2022-04-23

## 2022-04-23 PROBLEM — R65.10 SIRS (SYSTEMIC INFLAMMATORY RESPONSE SYNDROME) (HCC): Status: ACTIVE | Noted: 2022-04-23

## 2022-04-23 PROBLEM — D62 ABLA (ACUTE BLOOD LOSS ANEMIA): Status: ACTIVE | Noted: 2022-04-19

## 2022-04-23 PROBLEM — R93.89 ABNORMAL CT OF THE CHEST: Status: ACTIVE | Noted: 2022-04-23

## 2022-04-23 PROBLEM — S22.000A COMPRESSION FRACTURE OF BODY OF THORACIC VERTEBRA (HCC): Status: ACTIVE | Noted: 2022-04-23

## 2022-04-23 LAB
4HR DELTA HS TROPONIN: 75 NG/L
ABO GROUP BLD BPU: NORMAL
ABO GROUP BLD BPU: NORMAL
ABO GROUP BLD: NORMAL
ALBUMIN SERPL BCP-MCNC: 3.6 G/DL (ref 3.5–5)
ALP SERPL-CCNC: 36 U/L (ref 34–104)
ALT SERPL W P-5'-P-CCNC: 12 U/L (ref 7–52)
ANION GAP SERPL CALCULATED.3IONS-SCNC: 11 MMOL/L (ref 4–13)
APTT PPP: 36 SECONDS (ref 23–37)
AST SERPL W P-5'-P-CCNC: 22 U/L (ref 13–39)
BACTERIA UR QL AUTO: ABNORMAL /HPF
BASE EX.OXY STD BLDV CALC-SCNC: 67.6 % (ref 60–80)
BASE EX.OXY STD BLDV CALC-SCNC: 79.1 % (ref 60–80)
BASE EXCESS BLDV CALC-SCNC: -2.5 MMOL/L
BASE EXCESS BLDV CALC-SCNC: -3.9 MMOL/L
BASOPHILS # BLD AUTO: 0.03 THOUSANDS/ΜL (ref 0–0.1)
BASOPHILS NFR BLD AUTO: 0 % (ref 0–1)
BILIRUB SERPL-MCNC: 0.55 MG/DL (ref 0.2–1)
BILIRUB UR QL STRIP: NEGATIVE
BLD GP AB SCN SERPL QL: NEGATIVE
BPU ID: NORMAL
BPU ID: NORMAL
BUN SERPL-MCNC: 40 MG/DL (ref 5–25)
CALCIUM SERPL-MCNC: 9 MG/DL (ref 8.4–10.2)
CARDIAC TROPONIN I PNL SERPL HS: 320 NG/L
CARDIAC TROPONIN I PNL SERPL HS: 395 NG/L
CHLORIDE SERPL-SCNC: 100 MMOL/L (ref 96–108)
CLARITY UR: CLEAR
CO2 SERPL-SCNC: 21 MMOL/L (ref 21–32)
COLOR UR: YELLOW
CREAT SERPL-MCNC: 1.19 MG/DL (ref 0.6–1.3)
CROSSMATCH: NORMAL
CROSSMATCH: NORMAL
EOSINOPHIL # BLD AUTO: 0.07 THOUSAND/ΜL (ref 0–0.61)
EOSINOPHIL NFR BLD AUTO: 1 % (ref 0–6)
ERYTHROCYTE [DISTWIDTH] IN BLOOD BY AUTOMATED COUNT: 18.6 % (ref 11.6–15.1)
GFR SERPL CREATININE-BSD FRML MDRD: 39 ML/MIN/1.73SQ M
GLUCOSE SERPL-MCNC: 289 MG/DL (ref 65–140)
GLUCOSE SERPL-MCNC: 301 MG/DL (ref 65–140)
GLUCOSE SERPL-MCNC: 326 MG/DL (ref 65–140)
GLUCOSE UR STRIP-MCNC: NEGATIVE MG/DL
HCO3 BLDV-SCNC: 20.3 MMOL/L (ref 24–30)
HCO3 BLDV-SCNC: 20.6 MMOL/L (ref 24–30)
HCT VFR BLD AUTO: 22.6 % (ref 34.8–46.1)
HGB BLD-MCNC: 7.2 G/DL (ref 11.5–15.4)
HGB UR QL STRIP.AUTO: ABNORMAL
IMM GRANULOCYTES # BLD AUTO: 0.11 THOUSAND/UL (ref 0–0.2)
IMM GRANULOCYTES NFR BLD AUTO: 1 % (ref 0–2)
INR PPP: 1.16 (ref 0.84–1.19)
KETONES UR STRIP-MCNC: ABNORMAL MG/DL
LACTATE SERPL-SCNC: 1.9 MMOL/L (ref 0.5–2)
LEUKOCYTE ESTERASE UR QL STRIP: NEGATIVE
LIPASE SERPL-CCNC: 30 U/L (ref 11–82)
LYMPHOCYTES # BLD AUTO: 1.12 THOUSANDS/ΜL (ref 0.6–4.47)
LYMPHOCYTES NFR BLD AUTO: 8 % (ref 14–44)
MCH RBC QN AUTO: 30 PG (ref 26.8–34.3)
MCHC RBC AUTO-ENTMCNC: 31.9 G/DL (ref 31.4–37.4)
MCV RBC AUTO: 94 FL (ref 82–98)
MONOCYTES # BLD AUTO: 1.12 THOUSAND/ΜL (ref 0.17–1.22)
MONOCYTES NFR BLD AUTO: 8 % (ref 4–12)
NEUTROPHILS # BLD AUTO: 11.23 THOUSANDS/ΜL (ref 1.85–7.62)
NEUTS SEG NFR BLD AUTO: 82 % (ref 43–75)
NITRITE UR QL STRIP: NEGATIVE
NON-SQ EPI CELLS URNS QL MICRO: ABNORMAL /HPF
NRBC BLD AUTO-RTO: 0 /100 WBCS
O2 CT BLDV-SCNC: 11.2 ML/DL
O2 CT BLDV-SCNC: 7.7 ML/DL
PCO2 BLDV: 28.9 MM HG (ref 42–50)
PCO2 BLDV: 33.9 MM HG (ref 42–50)
PH BLDV: 7.4 [PH] (ref 7.3–7.4)
PH BLDV: 7.47 [PH] (ref 7.3–7.4)
PH UR STRIP.AUTO: 5 [PH]
PLATELET # BLD AUTO: 277 THOUSANDS/UL (ref 149–390)
PMV BLD AUTO: 11.7 FL (ref 8.9–12.7)
PO2 BLDV: 35.5 MM HG (ref 35–45)
PO2 BLDV: 46.1 MM HG (ref 35–45)
POTASSIUM SERPL-SCNC: 5.3 MMOL/L (ref 3.5–5.3)
PROCALCITONIN SERPL-MCNC: 0.14 NG/ML
PROT SERPL-MCNC: 6.5 G/DL (ref 6.4–8.4)
PROT UR STRIP-MCNC: NEGATIVE MG/DL
PROTHROMBIN TIME: 14.6 SECONDS (ref 11.6–14.5)
RBC # BLD AUTO: 2.4 MILLION/UL (ref 3.81–5.12)
RBC #/AREA URNS AUTO: ABNORMAL /HPF
RH BLD: POSITIVE
SODIUM SERPL-SCNC: 132 MMOL/L (ref 135–147)
SP GR UR STRIP.AUTO: >=1.03 (ref 1–1.03)
SPECIMEN EXPIRATION DATE: NORMAL
UNIT DISPENSE STATUS: NORMAL
UNIT DISPENSE STATUS: NORMAL
UNIT PRODUCT CODE: NORMAL
UNIT PRODUCT CODE: NORMAL
UNIT PRODUCT VOLUME: 350 ML
UNIT PRODUCT VOLUME: 350 ML
UNIT RH: NORMAL
UNIT RH: NORMAL
UROBILINOGEN UR QL STRIP.AUTO: 0.2 E.U./DL
WBC # BLD AUTO: 13.68 THOUSAND/UL (ref 4.31–10.16)
WBC #/AREA URNS AUTO: ABNORMAL /HPF

## 2022-04-23 PROCEDURE — 83605 ASSAY OF LACTIC ACID: CPT

## 2022-04-23 PROCEDURE — 82805 BLOOD GASES W/O2 SATURATION: CPT

## 2022-04-23 PROCEDURE — 82948 REAGENT STRIP/BLOOD GLUCOSE: CPT

## 2022-04-23 PROCEDURE — P9016 RBC LEUKOCYTES REDUCED: HCPCS

## 2022-04-23 PROCEDURE — 86901 BLOOD TYPING SEROLOGIC RH(D): CPT | Performed by: PHYSICIAN ASSISTANT

## 2022-04-23 PROCEDURE — 84145 PROCALCITONIN (PCT): CPT | Performed by: PHYSICIAN ASSISTANT

## 2022-04-23 PROCEDURE — 93005 ELECTROCARDIOGRAM TRACING: CPT

## 2022-04-23 PROCEDURE — 82805 BLOOD GASES W/O2 SATURATION: CPT | Performed by: PHYSICIAN ASSISTANT

## 2022-04-23 PROCEDURE — 84484 ASSAY OF TROPONIN QUANT: CPT | Performed by: PHYSICIAN ASSISTANT

## 2022-04-23 PROCEDURE — C9113 INJ PANTOPRAZOLE SODIUM, VIA: HCPCS

## 2022-04-23 PROCEDURE — 99291 CRITICAL CARE FIRST HOUR: CPT | Performed by: ANESTHESIOLOGY

## 2022-04-23 PROCEDURE — 71045 X-RAY EXAM CHEST 1 VIEW: CPT

## 2022-04-23 PROCEDURE — 74176 CT ABD & PELVIS W/O CONTRAST: CPT

## 2022-04-23 PROCEDURE — 30233N1 TRANSFUSION OF NONAUTOLOGOUS RED BLOOD CELLS INTO PERIPHERAL VEIN, PERCUTANEOUS APPROACH: ICD-10-PCS | Performed by: INTERNAL MEDICINE

## 2022-04-23 PROCEDURE — 83690 ASSAY OF LIPASE: CPT | Performed by: PHYSICIAN ASSISTANT

## 2022-04-23 PROCEDURE — 96366 THER/PROPH/DIAG IV INF ADDON: CPT

## 2022-04-23 PROCEDURE — 86900 BLOOD TYPING SEROLOGIC ABO: CPT | Performed by: PHYSICIAN ASSISTANT

## 2022-04-23 PROCEDURE — 85025 COMPLETE CBC W/AUTO DIFF WBC: CPT | Performed by: PHYSICIAN ASSISTANT

## 2022-04-23 PROCEDURE — 81003 URINALYSIS AUTO W/O SCOPE: CPT | Performed by: PHYSICIAN ASSISTANT

## 2022-04-23 PROCEDURE — 96368 THER/DIAG CONCURRENT INF: CPT

## 2022-04-23 PROCEDURE — 99291 CRITICAL CARE FIRST HOUR: CPT | Performed by: PHYSICIAN ASSISTANT

## 2022-04-23 PROCEDURE — 70450 CT HEAD/BRAIN W/O DYE: CPT

## 2022-04-23 PROCEDURE — 96365 THER/PROPH/DIAG IV INF INIT: CPT

## 2022-04-23 PROCEDURE — 85610 PROTHROMBIN TIME: CPT | Performed by: PHYSICIAN ASSISTANT

## 2022-04-23 PROCEDURE — 99285 EMERGENCY DEPT VISIT HI MDM: CPT

## 2022-04-23 PROCEDURE — 84484 ASSAY OF TROPONIN QUANT: CPT

## 2022-04-23 PROCEDURE — 81001 URINALYSIS AUTO W/SCOPE: CPT | Performed by: PHYSICIAN ASSISTANT

## 2022-04-23 PROCEDURE — 86920 COMPATIBILITY TEST SPIN: CPT

## 2022-04-23 PROCEDURE — 86850 RBC ANTIBODY SCREEN: CPT | Performed by: PHYSICIAN ASSISTANT

## 2022-04-23 PROCEDURE — 85730 THROMBOPLASTIN TIME PARTIAL: CPT | Performed by: PHYSICIAN ASSISTANT

## 2022-04-23 PROCEDURE — 80053 COMPREHEN METABOLIC PANEL: CPT | Performed by: PHYSICIAN ASSISTANT

## 2022-04-23 PROCEDURE — 87040 BLOOD CULTURE FOR BACTERIA: CPT

## 2022-04-23 PROCEDURE — 86923 COMPATIBILITY TEST ELECTRIC: CPT

## 2022-04-23 PROCEDURE — 71250 CT THORAX DX C-: CPT

## 2022-04-23 PROCEDURE — 36415 COLL VENOUS BLD VENIPUNCTURE: CPT | Performed by: PHYSICIAN ASSISTANT

## 2022-04-23 RX ORDER — LEVOTHYROXINE SODIUM 0.1 MG/1
100 TABLET ORAL DAILY
Status: DISCONTINUED | OUTPATIENT
Start: 2022-04-24 | End: 2022-04-28 | Stop reason: HOSPADM

## 2022-04-23 RX ORDER — PRAVASTATIN SODIUM 40 MG
40 TABLET ORAL DAILY
Status: DISCONTINUED | OUTPATIENT
Start: 2022-04-23 | End: 2022-04-28 | Stop reason: HOSPADM

## 2022-04-23 RX ORDER — ONDANSETRON 2 MG/ML
4 INJECTION INTRAMUSCULAR; INTRAVENOUS EVERY 8 HOURS PRN
Status: DISCONTINUED | OUTPATIENT
Start: 2022-04-23 | End: 2022-04-28 | Stop reason: HOSPADM

## 2022-04-23 RX ORDER — LIDOCAINE 50 MG/G
1 PATCH TOPICAL DAILY
Status: DISCONTINUED | OUTPATIENT
Start: 2022-04-24 | End: 2022-04-28 | Stop reason: HOSPADM

## 2022-04-23 RX ORDER — CEFEPIME HYDROCHLORIDE 2 G/50ML
2000 INJECTION, SOLUTION INTRAVENOUS ONCE
Status: COMPLETED | OUTPATIENT
Start: 2022-04-23 | End: 2022-04-23

## 2022-04-23 RX ORDER — PANTOPRAZOLE SODIUM 40 MG/1
40 INJECTION, POWDER, FOR SOLUTION INTRAVENOUS ONCE
Status: COMPLETED | OUTPATIENT
Start: 2022-04-23 | End: 2022-04-23

## 2022-04-23 RX ORDER — PANTOPRAZOLE SODIUM 40 MG/1
40 INJECTION, POWDER, FOR SOLUTION INTRAVENOUS EVERY 12 HOURS SCHEDULED
Status: DISCONTINUED | OUTPATIENT
Start: 2022-04-23 | End: 2022-04-23

## 2022-04-23 RX ORDER — FENTANYL CITRATE 50 UG/ML
25 INJECTION, SOLUTION INTRAMUSCULAR; INTRAVENOUS ONCE
Status: DISCONTINUED | OUTPATIENT
Start: 2022-04-23 | End: 2022-04-23

## 2022-04-23 RX ADMIN — SODIUM CHLORIDE 1000 ML: 0.9 INJECTION, SOLUTION INTRAVENOUS at 15:42

## 2022-04-23 RX ADMIN — PRAVASTATIN SODIUM 40 MG: 40 TABLET ORAL at 17:25

## 2022-04-23 RX ADMIN — INSULIN LISPRO 5 UNITS: 100 INJECTION, SOLUTION INTRAVENOUS; SUBCUTANEOUS at 17:54

## 2022-04-23 RX ADMIN — PANTOPRAZOLE SODIUM 40 MG: 40 INJECTION, POWDER, FOR SOLUTION INTRAVENOUS at 15:42

## 2022-04-23 RX ADMIN — Medication 8 MG/HR: at 17:24

## 2022-04-23 RX ADMIN — NOREPINEPHRINE BITARTRATE 5 MCG/MIN: 1 SOLUTION INTRAVENOUS at 13:45

## 2022-04-23 RX ADMIN — CEFEPIME HYDROCHLORIDE 2000 MG: 2 INJECTION, SOLUTION INTRAVENOUS at 13:23

## 2022-04-23 NOTE — ASSESSMENT & PLAN NOTE
· Sodium 132 on arrival, likely due to hypovolemic hyponatremia secondary to blood loss  · 1 liter NSS given in ED  · Transfuse 2 units pRBC  · Monitor sodium on chemistry in the morning

## 2022-04-23 NOTE — ASSESSMENT & PLAN NOTE
· CT chest revealed B/L interstitial lung disease likely usual interstitial pneumonia, superimposed diffuse ground glass opacities could represent cysts, pulmonary edema, or active inflammatory phase of underlying interstitial lung disease   Increased B/L pleural effusions  · Also noted intrathoracic stomach  · Per daughter these lung changes had been previously noted and an etiology has not been determined  · Consider pulmonary consult vs outpatient follow up

## 2022-04-23 NOTE — ASSESSMENT & PLAN NOTE
· POA with patient lethargic and altered  · CT head showed no acute intracranial abnormality  · Now improved with resuscitation and increased BP  · At baseline patient has dementia and short term memory loss  · Monitor neuro exam

## 2022-04-23 NOTE — ASSESSMENT & PLAN NOTE
Lab Results   Component Value Date    HGBA1C 7 2 05/06/2020       Recent Labs     04/20/22  2100 04/21/22  0721 04/23/22  1302   POCGLU 229* 193* 301*       Blood Sugar Average: Last 72 hrs:  (P) 301   · Hold home 70/30 and Glucovance  · Start accuchecks Q6 with SSI while NPO  · Hypoglycemia protocol  · Check Hgb A1c in the morning

## 2022-04-23 NOTE — ASSESSMENT & PLAN NOTE
· Patient has a history of an upper GI "bleeding ulcer" approximately 10 years ago  · Developed a UGIB during hospitalization from 4/18/22-4/21/22   She was deemed too unstable for inpatient EGD and was recommended for outpatient EGD follow up which patient declined  · Discharged on Protonix daily  · On arrival patient appears pale and hemoglobin initially 7 2  · BUN elevated at 40  · Likely this is a slow ooze that has continued since last hospitalization as patient did not have EGD to evaluate to treat bleed  · Family is open to considering inpatient EGD if it is recommended       · Transfuse 2 units pRBC  · Monitor hemoglobin following transfusion then Q6h  · Start Protonix gtt  · NPO  · Consult GI Detail Level: Simple

## 2022-04-23 NOTE — ASSESSMENT & PLAN NOTE
· Superior endplate compression fracture of the T12 vertebral body   Unclear if chronic  · Lidoderm patch to back Q12 hr  · Tylenol PRN  · PT/OT

## 2022-04-23 NOTE — ASSESSMENT & PLAN NOTE
· SIRS criteria met on arrival with tachycardia, tachypnea and leukocytosis  · Patient discharged 2 days prior on a 5 day course of Augmentin for RUL pneumonia and cystitis  · No indication of current infection procalcitonin now negative, UA and CXR are without indication of infection and patient is afebrile  · Monitor off antibiotics   · Check procalcitonin in the morning  · Trend WBC and temperature curve

## 2022-04-23 NOTE — ED PROVIDER NOTES
History  Chief Complaint   Patient presents with    Hypotension     per ems decresed LOC and hypotension     80year-old female presents to the emergency department seeking evaluation for altered mental status and difficulty walking  Patient appears in extremis extremely pale and critically ill  Blood pressures are in the 80s  Patient is lethargic but protecting her own airway  Patient does respond when questioned and states that she feels horrible  Patient had a recent hospital admission for sepsis and probable GI bleed  Patient did require blood transfusion at that time  Given the seriousness of the patient's presentation limited history was obtained  Aggressive resuscitative measures were taken  Patient is reported to be known as a DNR DNI status however wishes for full medical care  This was confirmed after discussions with the patient  Allergies reviewed          Prior to Admission Medications   Prescriptions Last Dose Informant Patient Reported? Taking?    Insulin Pen Needle (PEN NEEDLES) 32G X 4 MM MISC   No No   Sig: by Does not apply route 2 (two) times a day   amLODIPine (NORVASC) 10 mg tablet   No No   Sig: Take 1 tablet (10 mg total) by mouth daily   amoxicillin-clavulanate (AUGMENTIN) 875-125 mg per tablet   No No   Sig: Take 1 tablet by mouth every 12 (twelve) hours for 5 days   glucose blood (ONE TOUCH ULTRA TEST) test strip   No No   Sig: Use as instructed   glyBURIDE-metFORMIN (GLUCOVANCE) 5-500 MG per tablet   No No   Sig: Take 1 tablet by mouth see administration instructions Take 1 1/2 tablets  twice daily   insulin aspart protamine-insulin aspart (NovoLOG Mix 70/30 FlexPen) 100 Units/mL injection pen   No No   Sig: Inject 30 Units under the skin 2 (two) times a day before meals   irbesartan (AVAPRO) 150 mg tablet   No No   Sig: Take 1 tablet (150 mg total) by mouth daily   levothyroxine 100 mcg tablet   No No   Sig: Take 1 tablet (100 mcg total) by mouth daily   pantoprazole (PROTONIX) 40 mg tablet   No No   Sig: Take 1 tablet (40 mg total) by mouth daily   potassium chloride (K-DUR) 10 mEq tablet   No No   Sig: Take 1 tablet (10 mEq total) by mouth daily   pravastatin (PRAVACHOL) 40 mg tablet   No No   Sig: Take 1 tablet (40 mg total) by mouth daily      Facility-Administered Medications: None       Past Medical History:   Diagnosis Date    Anemia     Arthritis     Basal cell carcinoma of skin of face     Bleeding ulcer     Diabetes mellitus ([address] )     Hypercholesterolemia     Hypertension     Hypothyroidism     Insulin dependent diabetes mellitus        Past Surgical History:   Procedure Laterality Date    CATARACT EXTRACTION Bilateral     MOHS SURGERY      REPLACEMENT TOTAL KNEE Right     SINUS SURGERY      TONSILLECTOMY      WISDOM TOOTH EXTRACTION         Family History   Problem Relation Age of Onset    Lung cancer Sister     Diabetes Mother     Cirrhosis Father      I have reviewed and agree with the history as documented  E-Cigarette/Vaping     E-Cigarette/Vaping Substances     Social History     Tobacco Use    Smoking status: Never Smoker    Smokeless tobacco: Never Used   Substance Use Topics    Alcohol use: Never    Drug use: No       Review of Systems   Unable to perform ROS: Acuity of condition       Physical Exam  Physical Exam  Vitals and nursing note reviewed  Constitutional:       General: She is in acute distress  Appearance: She is well-developed and well-groomed  She is ill-appearing and toxic-appearing  She is not diaphoretic  HENT:      Head: Normocephalic and atraumatic  Right Ear: External ear normal       Left Ear: External ear normal       Nose: Nose normal    Eyes:      Conjunctiva/sclera: Conjunctivae normal       Pupils: Pupils are equal, round, and reactive to light  Cardiovascular:      Rate and Rhythm: Normal rate and regular rhythm  Heart sounds: Normal heart sounds  No murmur heard  No friction rub   No gallop  Pulmonary:      Effort: Tachypnea present  Breath sounds: No stridor  No wheezing  Abdominal:      General: Bowel sounds are normal  There is no distension  Palpations: Abdomen is soft  Tenderness: There is no abdominal tenderness  There is no guarding  Musculoskeletal:         General: No tenderness  Normal range of motion  Cervical back: Normal range of motion  Skin:     General: Skin is cool  Capillary Refill: Capillary refill takes 2 to 3 seconds  Comments: Patient extremely pale   Neurological:      Mental Status: She is oriented to person, place, and time  She is lethargic  Psychiatric:         Behavior: Behavior is cooperative           Vital Signs  ED Triage Vitals [04/23/22 1300]   Temperature Pulse Respirations Blood Pressure SpO2   (!) 97 3 °F (36 3 °C) 89 18 (!) 89/53 (!) 88 %      Temp Source Heart Rate Source Patient Position - Orthostatic VS BP Location FiO2 (%)   Temporal -- Lying Left arm --      Pain Score       No Pain           Vitals:    04/23/22 1500 04/23/22 1515 04/23/22 1530 04/23/22 1532   BP: 101/52 (!) 83/45 (!) 82/55 106/58   Pulse: 93 95 93 94   Patient Position - Orthostatic VS:             Visual Acuity      ED Medications  Medications   sodium chloride 0 9 % bolus 1,000 mL (1,000 mL Intravenous New Bag 4/23/22 1542)   NOREPINEPHRINE 4 MG  ML NSS (CMPD ORDER) infusion (5 mcg/min Intravenous New Bag 4/23/22 1345)   pantoprazole (PROTONIX) injection 40 mg (has no administration in time range)   levothyroxine tablet 100 mcg (has no administration in time range)   pravastatin (PRAVACHOL) tablet 40 mg (has no administration in time range)   insulin lispro (HumaLOG) 100 units/mL subcutaneous injection 1-6 Units (has no administration in time range)   cefepime (MAXIPIME) IVPB (premix in dextrose) 2,000 mg 50 mL (0 mg Intravenous Stopped 4/23/22 1542)   pantoprazole (PROTONIX) injection 40 mg (40 mg Intravenous Given 4/23/22 1542) Diagnostic Studies  Results Reviewed     Procedure Component Value Units Date/Time    HS Troponin I 4hr [[id number]]     Lab Status: No result Specimen: Blood     Urine Microscopic [[id number]]  (Abnormal) Collected: 04/23/22 1445    Lab Status: Final result Specimen: Urine, Clean Catch Updated: 04/23/22 1512     RBC, UA 0-1 /hpf      WBC, UA 4-10 /hpf      Epithelial Cells Occasional /hpf      Bacteria, UA Occasional /hpf     UA w Reflex to Microscopic w Reflex to Culture [[id number]]  (Abnormal) Collected: 04/23/22 1445    Lab Status: Final result Specimen: Urine, Clean Catch Updated: 04/23/22 1451     Color, UA Yellow     Clarity, UA Clear     Specific Gravity, UA >=1 030     pH, UA 5 0     Leukocytes, UA Negative     Nitrite, UA Negative     Protein, UA Negative mg/dl      Glucose, UA Negative mg/dl      Ketones, UA Trace mg/dl      Urobilinogen, UA 0 2 E U /dl      Bilirubin, UA Negative     Blood, UA 2+    Procalcitonin [[id number]]  (Normal) Collected: 04/23/22 1325    Lab Status: Final result Specimen: Blood from Arm, Left Updated: 04/23/22 1408     Procalcitonin 0 14 ng/ml     HS Troponin 0hr (reflex protocol) [[id number]]  (Abnormal) Collected: 04/23/22 1325    Lab Status: Final result Specimen: Blood from Line, Venous Updated: 04/23/22 1406     hs TnI 0hr 320 ng/L     HS Troponin I 2hr [[id number]]     Lab Status: No result Specimen: Blood     Comprehensive metabolic panel [[id number]]  (Abnormal) Collected: 04/23/22 1313    Lab Status: Final result Specimen: Blood from Line, Venous Updated: 04/23/22 1404     Sodium 132 mmol/L      Potassium 5 3 mmol/L      Chloride 100 mmol/L      CO2 21 mmol/L      ANION GAP 11 mmol/L      BUN 40 mg/dL      Creatinine 1 19 mg/dL      Glucose 289 mg/dL      Calcium 9 0 mg/dL      AST 22 U/L      ALT 12 U/L      Alkaline Phosphatase 36 U/L      Total Protein 6 5 g/dL      Albumin 3 6 g/dL      Total Bilirubin 0 55 mg/dL      eGFR 39 ml/min/1 73sq m     Narrative: National Kidney Disease Foundation guidelines for Chronic Kidney Disease (CKD):     Stage 1 with normal or high GFR (GFR > 90 mL/min/1 73 square meters)    Stage 2 Mild CKD (GFR = 60-89 mL/min/1 73 square meters)    Stage 3A Moderate CKD (GFR = 45-59 mL/min/1 73 square meters)    Stage 3B Moderate CKD (GFR = 30-44 mL/min/1 73 square meters)    Stage 4 Severe CKD (GFR = 15-29 mL/min/1 73 square meters)    Stage 5 End Stage CKD (GFR <15 mL/min/1 73 square meters)  Note: GFR calculation is accurate only with a steady state creatinine    Lipase [[id number]]  (Normal) Collected: 04/23/22 1313    Lab Status: Final result Specimen: Blood from Line, Venous Updated: 04/23/22 1404     Lipase 30 u/L     Protime-INR [[id number]]  (Abnormal) Collected: 04/23/22 1325    Lab Status: Final result Specimen: Blood from Arm, Left Updated: 04/23/22 1348     Protime 14 6 seconds      INR 1 16    APTT [[id number]]  (Normal) Collected: 04/23/22 1325    Lab Status: Final result Specimen: Blood from Arm, Left Updated: 04/23/22 1348     PTT 36 seconds     Blood gas, Venous [[id number]]  (Abnormal) Collected: 04/23/22 1325    Lab Status: Final result Specimen: Blood from Line, Venous Updated: 04/23/22 1336     pH, Tayo 7 471     pCO2, Tayo 28 9 mm Hg      pO2, Tayo 35 5 mm Hg      HCO3, Tayo 20 6 mmol/L      Base Excess, Tayo -2 5 mmol/L      O2 Content, Tayo 7 7 ml/dL      O2 HGB, VENOUS 67 6 %     CBC and differential [[id number]]  (Abnormal) Collected: 04/23/22 1325    Lab Status: Final result Specimen: Blood from Line, Venous Updated: 04/23/22 1335     WBC 13 68 Thousand/uL      RBC 2 40 Million/uL      Hemoglobin 7 2 g/dL      Hematocrit 22 6 %      MCV 94 fL      MCH 30 0 pg      MCHC 31 9 g/dL      RDW 18 6 %      MPV 11 7 fL      Platelets 183 Thousands/uL      nRBC 0 /100 WBCs      Neutrophils Relative 82 %      Immat GRANS % 1 %      Lymphocytes Relative 8 %      Monocytes Relative 8 %      Eosinophils Relative 1 %      Basophils Relative 0 %      Neutrophils Absolute 11 23 Thousands/µL      Immature Grans Absolute 0 11 Thousand/uL      Lymphocytes Absolute 1 12 Thousands/µL      Monocytes Absolute 1 12 Thousand/µL      Eosinophils Absolute 0 07 Thousand/µL      Basophils Absolute 0 03 Thousands/µL     Lactic acid [[id number]] Collected: 04/23/22 1325    Lab Status: No result Specimen: Blood from Line, Venous     Blood culture #1 [[id number]]     Lab Status: No result Specimen: Blood     Blood culture #2 [[id number]]     Lab Status: No result Specimen: Blood     Fingerstick Glucose (POCT) [[id number]]  (Abnormal) Collected: 04/23/22 1302    Lab Status: Final result Updated: 04/23/22 1303     POC Glucose 301 mg/dl                  CT chest abdomen pelvis wo contrast   Final Result by Shahrzad Pulliam MD (04/23 1156)      Background 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 with superimposed diffuse groundglass opacities could represent cysts pulmonary edema or active inflammatory phase of underlying interstitial lung disease  Increased bilateral pleural effusions  Superior endplate compression fracture of the T12 vertebral body with minimal loss of vertebral body height  Chronicity remains indeterminate  Intrathoracic stomach  Cirrhosis  Workstation performed: [id number]         CT head without contrast   Final Result by Shahrzad Pulliam MD (04/23 2003)      No acute intracranial abnormality                    Workstation performed: [id number]         XR chest portable    (Results Pending)              Procedures  US Guided Peripheral IV    Date/Time: 4/23/2022 1:46 PM  Performed by: Nato Barakat PA-C  Authorized by: Nato Barakat PA-C     Patient location:  ED  Performed by:  NP/PA    CriticalCare Time  Performed by: Nato Barakat PA-C  Authorized by: Nato Barakat PA-C     Critical care provider statement:     Critical care time (minutes):  90    Critical care time was exclusive of:  Separately billable procedures and treating other patients and teaching time    Critical care was necessary to treat or prevent imminent or life-threatening deterioration of the following conditions:  Dehydration, endocrine crisis, metabolic crisis, sepsis and shock    Critical care was time spent personally by me on the following activities:  Blood draw for specimens, obtaining history from patient or surrogate, development of treatment plan with patient or surrogate, discussions with consultants, evaluation of patient's response to treatment, examination of patient, review of old charts, re-evaluation of patient's condition, ordering and review of radiographic studies, ordering and review of laboratory studies, ordering and performing treatments and interventions and interpretation of cardiac output measurements             ED Course  ED Course as of 04/23/22 1556   Sat Apr 23, 2022   1306 Patient appears in extremis clinical anemia recent history of blood transfusion required  Reported that black tarry loose stools  Ordering emergent blood transfusion  Will give 1 L of saline and reassess  Hypotensive and hypoxic  Patient extremely pale with abdominal pain  Possible GI bleed  Recent hospital admission for sepsis  1338 WBC(!): 13 68   1338 Hemoglobin(!): 7 2   1406 hs TnI 0hr(!): 320   1407 Elevated troponin suspected related to demand and hypotensive state  Will discuss with cardiology  Critical care formally consult  80 Keep patient gave verbal consent for blood given the severity of her illness written consent was not obtained  [address] Discussed with Dr Severo Rotunda from cards: [id number] ([place])  [facility]! No problem, there are no ischemic changes, she has a bifascicular block and the ST changes are repolarization from the RBBB   1424 Clinically the patient appears to be improving                              Initial Sepsis Screening     Row Name 04/23/22 4519 Is the patient's history suggestive of a new or worsening infection? No  -EN        Suspected source of infection --        Are two or more of the following signs & symptoms of infection both present and new to the patient? Yes (Proceed)  -EN        Indicate SIRS criteria Tachycardia > 90 bpm;Tachypnea > 20 resp per min;Leukocytosis (WBC > 27721 IJL)  -EN        If the answer is yes to both questions, suspicion of sepsis is present --        If severe sepsis is present AND tissue hypoperfusion perists in the hour after fluid resuscitation or lactate > 4, the patient meets criteria for SEPTIC SHOCK --        Are any of the following organ dysfunction criteria present within 6 hours of suspected infection and SIRS criteria that are NOT considered to be chronic conditions? --        Organ dysfunction SBP < 90 mmHg;MAP < 65 mmHg  due to ABLA, lactic acid pending  -EN        Date of presentation of severe sepsis --        Time of presentation of severe sepsis --        Tissue hypoperfusion persists in the hour after crystalloid fluid administration, evidenced, by either: --        Was hypotension present within one hour of the conclusion of crystalloid fluid administration? --        Date of presentation of septic shock --        Time of presentation of septic shock --              User Key  (r) = Recorded By, (t) = Taken By, (c) = Cosigned By    [address] Name Provider Type    EN Mahad Moment, [address]  Nurse Practitioner                              MDM  Number of Diagnoses or Management Options  Anemia  Hypotension  Shock ([facility] [address] )  Diagnosis management comments: Patient status overall appears dramatically improved with resuscitation in the emergency department  Likely upper GI bleed  Case was discussed with critical care  Patient to be admitted to the Critical Care Service as a level 1 step-down  Patient and family are agreeable to this plan         Amount and/or Complexity of Data Reviewed  Clinical lab tests: ordered and reviewed  Tests in the radiology section of CPT®: ordered and reviewed    Risk of Complications, Morbidity, and/or Mortality  Presenting problems: high  Diagnostic procedures: moderate  Management options: moderate    Patient Progress  Patient progress: stable      Disposition  Final diagnoses:   Shock ([address] )   Hypotension   Anemia     Time reflects when diagnosis was documented in both MDM as applicable and the Disposition within this note     Time User Action Codes Description Comment    4/23/2022  2:05 PM Lina Clos Add [R57 9] Shock ([address] )     4/23/2022  2:58 PM Lina Clos Add [I95 9] Hypotension     4/23/2022  2:59 PM Lina Clos Add [D64 9] Anemia     4/23/2022  3:23 PM Poli Hemphill Add [K92 2] Upper GI bleed       ED Disposition     ED Disposition Condition Date/Time Comment    Admit Stable Sat Apr 23, 2022  2:58 PM Case was discussed with Dr Cordelia Gaucher and the patient's admission status was agreed to be Admission Status: inpatient status to the service of Dr Cordelia Gaucher   Follow-up Information    None         Patient's Medications   Discharge Prescriptions    No medications on file       No discharge procedures on file      PDMP Review     None          ED Provider  Electronically Signed by           Asuncion Rogers PA-C  04/23/22 4488

## 2022-04-23 NOTE — ED NOTES
1 Unit of RBC transfusing with 2 RN verification provided by DR [facility]  Initial vitals documented, blood tubing used     Unit number [id number]  O POSITIVE  EXP  5/26/22       Darell Wills, RN  04/23/22 6917

## 2022-04-23 NOTE — ASSESSMENT & PLAN NOTE
· Discharged on 3 L during hospitalized in December for pneumonia and per daughter patient has declined follow up with pulmonology to assess her for removal  · Unclear etiology, ILD noted on imaging  · Currently on baseline O2 requirements 2-3 L NC  · Monitor SPO2

## 2022-04-23 NOTE — PLAN OF CARE
Problem: PAIN - ADULT  Goal: Verbalizes/displays adequate comfort level or baseline comfort level  Description: Interventions:  - Encourage patient to monitor pain and request assistance  - Assess pain using appropriate pain scale  - Administer analgesics based on type and severity of pain and evaluate response  - Implement non-pharmacological measures as appropriate and evaluate response  - Consider cultural and social influences on pain and pain management  - Notify physician/advanced practitioner if interventions unsuccessful or patient reports new pain  Outcome: Progressing     Problem: INFECTION - ADULT  Goal: Absence or prevention of progression during hospitalization  Description: INTERVENTIONS:  - Assess and monitor for signs and symptoms of infection  - Monitor lab/diagnostic results  - Monitor all insertion sites, i e  indwelling lines, tubes, and drains  - Monitor endotracheal if appropriate and nasal secretions for changes in amount and color  - [place] appropriate cooling/warming therapies per order  - Administer medications as ordered  - Instruct and encourage patient and family to use good hand hygiene technique  - Identify and instruct in appropriate isolation precautions for identified infection/condition  Outcome: Progressing  Goal: Absence of fever/infection during neutropenic period  Description: INTERVENTIONS:  - Monitor WBC    Outcome: Progressing     Problem: SAFETY ADULT  Goal: Patient will remain free of falls  Description: INTERVENTIONS:  - Educate patient/family on patient safety including physical limitations  - Instruct patient to call for assistance with activity   - Consult OT/PT to assist with strengthening/mobility   - Keep Call bell within reach  - Keep bed low and locked with side rails adjusted as appropriate  - Keep care items and personal belongings within reach  - Initiate and maintain comfort rounds  - Make Fall Risk Sign visible to staff    - Apply yellow socks and bracelet for high fall risk patients  - Consider moving patient to room near nurses station  Outcome: Progressing  Goal: Maintain or return to baseline ADL function  Description: INTERVENTIONS:  -  Assess patient's ability to carry out ADLs; assess patient's baseline for ADL function and identify physical deficits which impact ability to perform ADLs (bathing, care of mouth/teeth, toileting, grooming, dressing, etc )  - Assess/evaluate cause of self-care deficits   - Assess range of motion  - Assess patient's mobility; develop plan if impaired  - Assess patient's need for assistive devices and provide as appropriate  - Encourage maximum independence but intervene and supervise when necessary  - Involve family in performance of ADLs  - Assess for home care needs following discharge   - Consider OT consult to assist with ADL evaluation and planning for discharge  - Provide patient education as appropriate  Outcome: Progressing  Goal: Maintains/Returns to pre admission functional level  Description: INTERVENTIONS:  - Perform BMAT or MOVE assessment daily    - Set and communicate daily mobility goal to care team and patient/family/caregiver  - Collaborate with rehabilitation services on mobility goals if consulted  - Perform Range of Motion 4   Problem: Knowledge Deficit  Goal: Patient/family/caregiver demonstrates understanding of disease process, treatment plan, medications, and discharge instructions  Description: Complete learning assessment and assess knowledge base  Interventions:  - Provide teaching at level of understanding  - Provide teaching via preferred learning methods  Outcome: Progressing   times a day  - Reposition patient every 2 hours      - Out of bed for toileting  - Record patient progress and toleration of activity level   Outcome: Progressing

## 2022-04-23 NOTE — H&P
[address] [facility] 1/19/1929, 80 y o  female MRN: [id number]  Unit/Bed#: ICU 11-01 Encounter: [id number]  Primary Care Provider: Tilman Opitz, MD   Date and time admitted to hospital: 4/23/2022 12:58 PM    * Upper GI bleed  Assessment & Plan  · Patient has a history of an upper GI "bleeding ulcer" approximately 10 years ago  · Developed a UGIB during hospitalization from 4/18/22-4/21/22  She was deemed too unstable for inpatient EGD and was recommended for outpatient EGD follow up which patient declined  · Discharged on Protonix daily  · On arrival patient appears pale and hemoglobin initially 7 2  · BUN elevated at 40  · Likely this is a slow ooze that has continued since last hospitalization as patient did not have EGD to evaluate to treat bleed  · Family is open to considering inpatient EGD if it is recommended       · Transfuse 2 units pRBC  · Monitor hemoglobin following transfusion then Q6h  · Start Protonix gtt  · NPO  · Consult GI    Shock ([facility] [address] )  Assessment & Plan  · Two syncopal episodes prior to arrival likely secondary to hypotension +/- vasovagal as one occurring following a dark loose BM and the other occurred on standing  · SBP 70's-80's in the ED  · Likely hemorrhagic shock secondary to ABLA  · 1 liter crystalloids bolused in ED  · Norepinephrine was started in the ED    · Admit under critical care  · Continue norepinephrine and titrate to maintain MAP >65  · Transfuse 2 units pRBC  · See plan above  · Monitor blood pressure    Cirrhosis ([facility] [address] )  Assessment & Plan  · Noted on CT  · Previously noted on prior CT from 4/19/22, no previous abdominal imaging to compare to  · AST, ALT, Alk phos and Tbili WNL  · Abdominal exam benign  · Outpatient follow up    Compression fracture of body of thoracic vertebra (HCC)  Assessment & Plan  · Superior endplate compression fracture of the T12 vertebral body   Unclear if chronic  · Lidoderm patch to back Q12 hr  · Tylenol PRN  · PT/OT    Encephalopathy  Assessment & Plan  · POA with patient lethargic and altered  · CT head showed no acute intracranial abnormality  · Now improved with resuscitation and increased BP  · At baseline patient has dementia and short term memory loss  · Monitor neuro exam    Abnormal CT of the chest  Assessment & Plan  · CT chest revealed B/L interstitial lung disease likely usual interstitial pneumonia, superimposed diffuse ground glass opacities could represent cysts, pulmonary edema, or active inflammatory phase of underlying interstitial lung disease   Increased B/L pleural effusions  · Also noted intrathoracic stomach  · Per daughter these lung changes had been previously noted and an etiology has not been determined  · Consider pulmonary consult vs outpatient follow up    SIRS (systemic inflammatory response syndrome) ([facility] [address] )  Assessment & Plan  · SIRS criteria met on arrival with tachycardia, tachypnea and leukocytosis  · Patient discharged 2 days prior on a 5 day course of Augmentin for RUL pneumonia and cystitis  · No indication of current infection procalcitonin now negative, UA and CXR are without indication of infection and patient is afebrile  · Monitor off antibiotics   · Check procalcitonin in the morning  · Trend WBC and temperature curve    Hyponatremia  Assessment & Plan  · Sodium 132 on arrival, likely due to hypovolemic hyponatremia secondary to blood loss  · 1 liter NSS given in ED  · Transfuse 2 units pRBC  · Monitor sodium on chemistry in the morning    Chronic respiratory failure ([facility] [address] )  Assessment & Plan  · Discharged on 3 L during hospitalized in December for pneumonia and per daughter patient has declined follow up with pulmonology to assess her for removal  · Unclear etiology, ILD noted on imaging  · Currently on baseline O2 requirements 2-3 L NC  · Monitor SPO2    ABLA (acute blood loss anemia)  Assessment & Plan  · Patient presented with an initial hemoglobin of 7 2 and a baseline hemoglobin between 12-14  · Hemoglobin 8 5 on 4/21 prior to discharge so 1 3 gram drop  · Patient had declined outpatient EGD for UGIB during recent hospitalization and this is likely due to a slow ooze from history of bleeding ulcer  · Transfuse 2 units pRBC  · Monitor hemoglobin Q6h  · Transfuse for hemoglobin <7, symptomatic anemia or active bleeding    Hypothyroidism  Assessment & Plan  Continue synthroid    Diabetes mellitus type 2, insulin dependent [facility])  Assessment & Plan  Lab Results   Component Value Date    HGBA1C 7 2 05/06/2020       Recent Labs     04/20/22  2100 04/21/22  0721 04/23/22  1302   POCGLU 229* 193* 301*       Blood Sugar Average: Last 72 hrs:  (P) 301   · Hold home 70/30 and Glucovance  · Start accuchecks Q6 with SSI while NPO  · Hypoglycemia protocol  · Check Hgb A1c in the morning    Other hyperlipidemia  Assessment & Plan  Continue statin    Primary hypertension  Assessment & Plan  Hold home amlodipine and ibesartan while hypotensive    -------------------------------------------------------------------------------------------------------------  Chief Complaint: syncopal episode    History of Present Illness   HX and PE limited by: dementia/short term memory loss  Nirav Simeon is a 80 y o  female with PMH significant for chronic respiratory failure on 3 L at baseline, arthritis, IDDM2, HLD, HTN, dementia, and hypothyroid who presents following a syncopal episode at [facility] where she resides  Of note patient was recently hospitalized from 4/18/22-4/21/22 and was treated for RUL pneumonia, cystitis, severe sepsis and UGIB  An outpatient EGD had been recommended by GI on discharge, however patient declined   Per her daughter patient had been doing well following discharge except for occasional anxiety secondary to her shortness of breath and had a formed BM the evening prior to arrival  On the morning of admission the patient reported nausea to her daughter which resolved without intervention and patient went on to eat her breakfast following this  Per patient's daughter, following breakfast the patient reported needing to have a BM and she was incontinent of stool while walking to the bathroom  Stool appeared dark, however daughter does report patient received IV iron while hospitalized  Patient had a syncopal episode following ambulating back to the recliner chair after BM  Likely this was due to patient vasovagalling as the patient was easily arousable with "shaking her " EMS was called and when patient stood to be transported by EMS to the stretcher she had a near syncopal event as well  Following this patient remained altered and lethargic  Patient is now more alert and interactive  She was found to be anemic and is being admitted to critical care for further evaluation and management  History obtained from child and chart review  -------------------------------------------------------------------------------------------------------------  Dispo: Admit to Critical Care     Code Status: Level 3 - DNAR and DNI  --------------------------------------------------------------------------------------------------------------  Review of Systems   Unable to perform ROS: Dementia       Review of systems was unable to be performed secondary to dementia  Physical Exam  Constitutional:       General: She is not in acute distress  HENT:      Head: Normocephalic  Mouth/Throat:      Mouth: Mucous membranes are dry  Comments: Lips pale  Eyes:      Pupils: Pupils are equal, round, and reactive to light  Cardiovascular:      Rate and Rhythm: Normal rate and regular rhythm  Pulses: Normal pulses  Heart sounds: Murmur heard  Comments: Warm extrmities  Pulmonary:      Effort: Pulmonary effort is normal  No respiratory distress  Comments: B/L fine crackles noted on inspiration  Abdominal:      General: Abdomen is flat   Bowel sounds are normal  There is no distension  Palpations: Abdomen is soft  Tenderness: There is no abdominal tenderness  There is no guarding  Musculoskeletal:         General: Normal range of motion  Cervical back: Normal range of motion  Right lower leg: No edema  Left lower leg: No edema  Skin:     General: Skin is warm and dry  Capillary Refill: Capillary refill takes less than 2 seconds  Coloration: Skin is pale  Neurological:      General: No focal deficit present  Mental Status: She is alert  Mental status is at baseline  Comments: Alert and oriented to self, history dementia with short term memory loss       --------------------------------------------------------------------------------------------------------------  Vitals:   Vitals:    04/23/22 1527 04/23/22 1530 04/23/22 1532 04/23/22 1614   BP:  (!) 82/55 106/58 95/51   BP Location:    Left arm   Pulse:  93 94 95   Resp:   20 (!) 49   Temp:       TempSrc:       SpO2:  95% 95% 92%   Weight: 78 1 kg (172 lb 2 9 oz)   78 1 kg (172 lb 2 9 oz)   Height:    5' 4" (1 626 m)     Temp  Min: 97 3 °F (36 3 °C)  Max: 97 3 °F (36 3 °C)     Height: 5' 4" (162 6 cm)  Body mass index is 29 55 kg/m²  Laboratory and Diagnostics:  Results from last 7 days   Lab Units 04/23/22  1325 04/21/22  0509 04/20/22  0545 04/19/22  2340 04/19/22  1544 04/19/22  0807 04/19/22  0413 04/18/22  2301 04/18/22  2301   WBC Thousand/uL 13 68* 8 92 11 92*  --   --   --  10 07  --  11 01*   HEMOGLOBIN g/dL 7 2* 8 5* 8 4* 8 1* 8 2* 9 3* 8 6*   < > 7 6*   HEMATOCRIT % 22 6* 28 4* 28 0*  --   --   --  27 7*  --  24 6*   PLATELETS Thousands/uL 277 247 234  --   --   --  240  --  286   NEUTROS PCT % 82* 68 80*  --   --   --   --   --  75   BANDS PCT %  --   --   --   --   --   --  2  --   --    MONOS PCT % 8 9 8  --   --   --   --   --  7   MONO PCT %  --   --   --   --   --   --  1*  --   --     < > = values in this interval not displayed       Results from last 7 days   Lab Units 04/23/22  1313 04/21/22  0509 04/20/22  0545 04/19/22  1544 04/19/22  0413 04/18/22  2301   SODIUM mmol/L 132* 136 135 132* 132* 131*   POTASSIUM mmol/L 5 3 4 4 4 6 4 2 5 4* 5 1   CHLORIDE mmol/L 100 103 100 97 97 97   CO2 mmol/L 21 28 28 22 20* 23   ANION GAP mmol/L 11 5 7 13 15* 11   BUN mg/dL 40* 33* 43* 46* 47* 42*   CREATININE mg/dL 1 19 0 92 1 11 1 35* 1 34* 1 29   CALCIUM mg/dL 9 0 9 1 8 9 9 3 9 3 9 4   GLUCOSE RANDOM mg/dL 289* 135 207* 294* 439* 295*   ALT U/L 12  --   --   --   --  9   AST U/L 22  --   --   --   --  15   ALK PHOS U/L 36  --   --   --   --  50   ALBUMIN g/dL 3 6  --   --   --   --  3 8   TOTAL BILIRUBIN mg/dL 0 55  --   --   --   --  0 58     Results from last 7 days   Lab Units 04/21/22  0509 04/20/22  0545 04/19/22  1544 04/18/22  2301   MAGNESIUM mg/dL 2 0 2 0 1 7* 1 6*   PHOSPHORUS mg/dL 3 3 3 1 3 0  --       Results from last 7 days   Lab Units 04/23/22  1325 04/18/22  2310   INR  1 16 1 09   PTT seconds 36 30          Results from last 7 days   Lab Units 04/19/22  0131 04/18/22  2301   LACTIC ACID mmol/L 4 1* 4 5*     ABG:    VBG:  Results from last 7 days   Lab Units 04/23/22  1325   PH CONOR  7 471*   PCO2 CONOR mm Hg 28 9*   PO2 CONOR mm Hg 35 5   HCO3 CONOR mmol/L 20 6*   BASE EXC CONOR mmol/L -2 5     Results from last 7 days   Lab Units 04/23/22  1325 04/20/22  0545 04/18/22  2310   PROCALCITONIN ng/ml 0 14 0 09 0 10       Micro:  Results from last 7 days   Lab Units 04/19/22  2205 04/19/22  0217 04/18/22 2310 04/18/22  2301   BLOOD CULTURE   --   --  No Growth After 4 Days  No Growth After 4 Days  MRSA CULTURE ONLY  No Methicillin Resistant Staphlyococcus aureus (MRSA) isolated  --   --   --    LEGIONELLA URINARY ANTIGEN   --  Negative  --   --    STREP PNEUMONIAE ANTIGEN, URINE   --  Negative  --   --        EKG: Sinus rhythm   Imaging: I have personally reviewed pertinent reports          Historical Information   Past Medical History:   Diagnosis Date    Anemia     Arthritis     Basal cell carcinoma of skin of face     Bleeding ulcer     Diabetes mellitus ([address] )     Hypercholesterolemia     Hypertension     Hypothyroidism     Insulin dependent diabetes mellitus      Past Surgical History:   Procedure Laterality Date    CATARACT EXTRACTION Bilateral     MOHS SURGERY      REPLACEMENT TOTAL KNEE Right     SINUS SURGERY      TONSILLECTOMY      WISDOM TOOTH EXTRACTION       Social History   Social History     Substance and Sexual Activity   Alcohol Use Never     Social History     Substance and Sexual Activity   Drug Use No     Social History     Tobacco Use   Smoking Status Never Smoker   Smokeless Tobacco Never Used     Exercise History: Unknown  Family History:   Family History   Problem Relation Age of Onset    Lung cancer Sister     Diabetes Mother     Cirrhosis Father      I have reviewed this patient's family history and commented on sigificant items within the HPI      Medications:  Current Facility-Administered Medications   Medication Dose Route Frequency    insulin lispro (HumaLOG) 100 units/mL subcutaneous injection 1-6 Units  1-6 Units Subcutaneous Q6H [facility]    [START ON 4/24/2022] levothyroxine tablet 100 mcg  100 mcg Oral Daily    [START ON 4/24/2022] lidocaine (LIDODERM) 5 % patch 1 patch  1 patch Topical Daily    NOREPINEPHRINE 4 MG  ML NSS (CMPD ORDER) infusion  1-30 mcg/min Intravenous Titrated    pantoprazole (PROTONIX) 80 mg in sodium chloride 0 9 % 100 mL infusion  8 mg/hr Intravenous Continuous    pravastatin (PRAVACHOL) tablet 40 mg  40 mg Oral Daily     Home medications:  Prior to Admission Medications   Prescriptions Last Dose Informant Patient Reported? Taking?    Insulin Pen Needle (PEN NEEDLES) 32G X 4 MM MISC   No No   Sig: by Does not apply route 2 (two) times a day   amLODIPine (NORVASC) 10 mg tablet   No No   Sig: Take 1 tablet (10 mg total) by mouth daily   amoxicillin-clavulanate (AUGMENTIN) 875-125 mg per tablet   No No   Sig: Take 1 tablet by mouth every 12 (twelve) hours for 5 days   glucose blood (ONE TOUCH ULTRA TEST) test strip   No No   Sig: Use as instructed   glyBURIDE-metFORMIN (GLUCOVANCE) 5-500 MG per tablet   No No   Sig: Take 1 tablet by mouth see administration instructions Take 1 1/2 tablets  twice daily   insulin aspart protamine-insulin aspart (NovoLOG Mix 70/30 FlexPen) 100 Units/mL injection pen   No No   Sig: Inject 30 Units under the skin 2 (two) times a day before meals   irbesartan (AVAPRO) 150 mg tablet   No No   Sig: Take 1 tablet (150 mg total) by mouth daily   levothyroxine 100 mcg tablet   No No   Sig: Take 1 tablet (100 mcg total) by mouth daily   pantoprazole (PROTONIX) 40 mg tablet   No No   Sig: Take 1 tablet (40 mg total) by mouth daily   potassium chloride (K-DUR) 10 mEq tablet   No No   Sig: Take 1 tablet (10 mEq total) by mouth daily   pravastatin (PRAVACHOL) 40 mg tablet   No No   Sig: Take 1 tablet (40 mg total) by mouth daily      Facility-Administered Medications: None     Allergies: Allergies   Allergen Reactions    Iv Dye [Iodinated Diagnostic Agents]     Percocet [Oxycodone-Acetaminophen]     Phenobarbital     Statins        ------------------------------------------------------------------------------------------------------------  Advance Directive and Living Will:      Power of :    POLST:    ------------------------------------------------------------------------------------------------------------  Anticipated Length of Stay is > 2 midnights    Care Time Delivered:   Upon my evaluation, this patient had a high probability of imminent or life-threatening deterioration due to Shock, UGIB, ABLA, hyponatremia, encephalopathy, which required my direct attention, intervention, and personal management    I have personally provided 31 minutes (1430 to 1630) of critical care time, exclusive of procedures, teaching, family meetings, and any prior time recorded by providers other than myself  KUSH Brown        Portions of the record may have been created with voice recognition software  Occasional wrong word or "sound a like" substitutions may have occurred due to the inherent limitations of voice recognition software    Read the chart carefully and recognize, using context, where substitutions have occurred

## 2022-04-23 NOTE — ASSESSMENT & PLAN NOTE
· Patient presented with an initial hemoglobin of 7 2 and a baseline hemoglobin between 12-14  · Hemoglobin 8 5 on 4/21 prior to discharge so 1 3 gram drop  · Patient had declined outpatient EGD for UGIB during recent hospitalization and this is likely due to a slow ooze from history of bleeding ulcer  · Transfuse 2 units pRBC  · Monitor hemoglobin Q6h  · Transfuse for hemoglobin <7, symptomatic anemia or active bleeding

## 2022-04-23 NOTE — ASSESSMENT & PLAN NOTE
· Noted on CT  · Previously noted on prior CT from 4/19/22, no previous abdominal imaging to compare to  · AST, ALT, Alk phos and Tbili WNL  · Abdominal exam benign  · Outpatient follow up

## 2022-04-23 NOTE — ASSESSMENT & PLAN NOTE
· Two syncopal episodes prior to arrival likely secondary to hypotension +/- vasovagal as one occurring following a dark loose BM and the other occurred on standing  · SBP 70's-80's in the ED  · Likely hemorrhagic shock secondary to ABLA  · 1 liter crystalloids bolused in ED  · Norepinephrine was started in the ED    · Admit under critical care  · Continue norepinephrine and titrate to maintain MAP >65  · Transfuse 2 units pRBC  · See plan above  · Monitor blood pressure

## 2022-04-24 ENCOUNTER — TELEPHONE (OUTPATIENT)
Dept: PERIOP | Facility: HOSPITAL | Age: 87
End: 2022-04-24

## 2022-04-24 ENCOUNTER — APPOINTMENT (INPATIENT)
Dept: PERIOP | Facility: HOSPITAL | Age: 87
DRG: 377 | End: 2022-04-24
Attending: INTERNAL MEDICINE
Payer: MEDICARE

## 2022-04-24 ENCOUNTER — ANESTHESIA (INPATIENT)
Dept: PERIOP | Facility: HOSPITAL | Age: 87
DRG: 377 | End: 2022-04-24
Payer: MEDICARE

## 2022-04-24 ENCOUNTER — ANESTHESIA EVENT (INPATIENT)
Dept: PERIOP | Facility: HOSPITAL | Age: 87
DRG: 377 | End: 2022-04-24
Payer: MEDICARE

## 2022-04-24 PROBLEM — R77.8 ELEVATED TROPONIN: Status: ACTIVE | Noted: 2022-04-24

## 2022-04-24 PROBLEM — E87.1 HYPONATREMIA: Status: RESOLVED | Noted: 2022-04-23 | Resolved: 2022-04-24

## 2022-04-24 PROBLEM — R57.9 SHOCK (HCC): Status: RESOLVED | Noted: 2022-04-23 | Resolved: 2022-04-24

## 2022-04-24 PROBLEM — G93.40 ENCEPHALOPATHY: Status: RESOLVED | Noted: 2022-04-23 | Resolved: 2022-04-24

## 2022-04-24 LAB
2HR DELTA HS TROPONIN: -145 NG/L
ABO GROUP BLD BPU: NORMAL
ALBUMIN SERPL BCP-MCNC: 3.2 G/DL (ref 3.5–5)
ALP SERPL-CCNC: 33 U/L (ref 34–104)
ALT SERPL W P-5'-P-CCNC: 4 U/L (ref 7–52)
ANION GAP SERPL CALCULATED.3IONS-SCNC: 6 MMOL/L (ref 4–13)
AST SERPL W P-5'-P-CCNC: 11 U/L (ref 13–39)
BACTERIA BLD CULT: NORMAL
BACTERIA BLD CULT: NORMAL
BASOPHILS # BLD AUTO: 0.03 THOUSANDS/ΜL (ref 0–0.1)
BASOPHILS NFR BLD AUTO: 0 % (ref 0–1)
BILIRUB SERPL-MCNC: 0.75 MG/DL (ref 0.2–1)
BPU ID: NORMAL
BUN SERPL-MCNC: 30 MG/DL (ref 5–25)
CA-I BLD-SCNC: 1.13 MMOL/L (ref 1.12–1.32)
CALCIUM ALBUM COR SERPL-MCNC: 9.1 MG/DL (ref 8.3–10.1)
CALCIUM SERPL-MCNC: 8.5 MG/DL (ref 8.4–10.2)
CARDIAC TROPONIN I PNL SERPL HS: 593 NG/L
CARDIAC TROPONIN I PNL SERPL HS: 738 NG/L
CARDIAC TROPONIN I PNL SERPL HS: 763 NG/L (ref 8–18)
CHLORIDE SERPL-SCNC: 107 MMOL/L (ref 96–108)
CO2 SERPL-SCNC: 26 MMOL/L (ref 21–32)
CREAT SERPL-MCNC: 1.11 MG/DL (ref 0.6–1.3)
CROSSMATCH: NORMAL
EOSINOPHIL # BLD AUTO: 0.17 THOUSAND/ΜL (ref 0–0.61)
EOSINOPHIL NFR BLD AUTO: 2 % (ref 0–6)
ERYTHROCYTE [DISTWIDTH] IN BLOOD BY AUTOMATED COUNT: 18.3 % (ref 11.6–15.1)
EST. AVERAGE GLUCOSE BLD GHB EST-MCNC: 117 MG/DL
GFR SERPL CREATININE-BSD FRML MDRD: 42 ML/MIN/1.73SQ M
GLUCOSE SERPL-MCNC: 132 MG/DL (ref 65–140)
GLUCOSE SERPL-MCNC: 143 MG/DL (ref 65–140)
GLUCOSE SERPL-MCNC: 146 MG/DL (ref 65–140)
GLUCOSE SERPL-MCNC: 160 MG/DL (ref 65–140)
GLUCOSE SERPL-MCNC: 170 MG/DL (ref 65–140)
HBA1C MFR BLD: 5.7 %
HCT VFR BLD AUTO: 28.2 % (ref 34.8–46.1)
HCT VFR BLD AUTO: 28.3 % (ref 34.8–46.1)
HCT VFR BLD AUTO: 29.6 % (ref 34.8–46.1)
HCT VFR BLD AUTO: 33.6 % (ref 34.8–46.1)
HGB BLD-MCNC: 10.2 G/DL (ref 11.5–15.4)
HGB BLD-MCNC: 8.8 G/DL (ref 11.5–15.4)
HGB BLD-MCNC: 9 G/DL (ref 11.5–15.4)
HGB BLD-MCNC: 9.1 G/DL (ref 11.5–15.4)
IMM GRANULOCYTES # BLD AUTO: 0.05 THOUSAND/UL (ref 0–0.2)
IMM GRANULOCYTES NFR BLD AUTO: 1 % (ref 0–2)
LYMPHOCYTES # BLD AUTO: 1.27 THOUSANDS/ΜL (ref 0.6–4.47)
LYMPHOCYTES NFR BLD AUTO: 12 % (ref 14–44)
MAGNESIUM SERPL-MCNC: 1.9 MG/DL (ref 1.9–2.7)
MCH RBC QN AUTO: 30.2 PG (ref 26.8–34.3)
MCHC RBC AUTO-ENTMCNC: 31.2 G/DL (ref 31.4–37.4)
MCV RBC AUTO: 97 FL (ref 82–98)
MONOCYTES # BLD AUTO: 0.84 THOUSAND/ΜL (ref 0.17–1.22)
MONOCYTES NFR BLD AUTO: 8 % (ref 4–12)
NEUTROPHILS # BLD AUTO: 8.36 THOUSANDS/ΜL (ref 1.85–7.62)
NEUTS SEG NFR BLD AUTO: 77 % (ref 43–75)
NRBC BLD AUTO-RTO: 0 /100 WBCS
PHOSPHATE SERPL-MCNC: 3.1 MG/DL (ref 2.3–4.1)
PLATELET # BLD AUTO: 216 THOUSANDS/UL (ref 149–390)
PMV BLD AUTO: 10.5 FL (ref 8.9–12.7)
POTASSIUM SERPL-SCNC: 4.4 MMOL/L (ref 3.5–5.3)
PROCALCITONIN SERPL-MCNC: 0.12 NG/ML
PROT SERPL-MCNC: 5.9 G/DL (ref 6.4–8.4)
RBC # BLD AUTO: 2.91 MILLION/UL (ref 3.81–5.12)
SODIUM SERPL-SCNC: 139 MMOL/L (ref 135–147)
UNIT DISPENSE STATUS: NORMAL
UNIT PRODUCT CODE: NORMAL
UNIT PRODUCT VOLUME: 350 ML
UNIT RH: NORMAL
WBC # BLD AUTO: 10.72 THOUSAND/UL (ref 4.31–10.16)

## 2022-04-24 PROCEDURE — 82330 ASSAY OF CALCIUM: CPT

## 2022-04-24 PROCEDURE — 82948 REAGENT STRIP/BLOOD GLUCOSE: CPT

## 2022-04-24 PROCEDURE — 84145 PROCALCITONIN (PCT): CPT

## 2022-04-24 PROCEDURE — C9113 INJ PANTOPRAZOLE SODIUM, VIA: HCPCS

## 2022-04-24 PROCEDURE — 85018 HEMOGLOBIN: CPT | Performed by: NURSE PRACTITIONER

## 2022-04-24 PROCEDURE — 83036 HEMOGLOBIN GLYCOSYLATED A1C: CPT

## 2022-04-24 PROCEDURE — 85018 HEMOGLOBIN: CPT | Performed by: PHYSICIAN ASSISTANT

## 2022-04-24 PROCEDURE — 85014 HEMATOCRIT: CPT | Performed by: PHYSICIAN ASSISTANT

## 2022-04-24 PROCEDURE — 85014 HEMATOCRIT: CPT

## 2022-04-24 PROCEDURE — 85014 HEMATOCRIT: CPT | Performed by: NURSE PRACTITIONER

## 2022-04-24 PROCEDURE — 85025 COMPLETE CBC W/AUTO DIFF WBC: CPT

## 2022-04-24 PROCEDURE — 80053 COMPREHEN METABOLIC PANEL: CPT

## 2022-04-24 PROCEDURE — 84484 ASSAY OF TROPONIN QUANT: CPT | Performed by: PHYSICIAN ASSISTANT

## 2022-04-24 PROCEDURE — 85018 HEMOGLOBIN: CPT

## 2022-04-24 PROCEDURE — 99223 1ST HOSP IP/OBS HIGH 75: CPT | Performed by: INTERNAL MEDICINE

## 2022-04-24 PROCEDURE — 83735 ASSAY OF MAGNESIUM: CPT

## 2022-04-24 PROCEDURE — C9113 INJ PANTOPRAZOLE SODIUM, VIA: HCPCS | Performed by: NURSE PRACTITIONER

## 2022-04-24 PROCEDURE — 43270 EGD LESION ABLATION: CPT | Performed by: INTERNAL MEDICINE

## 2022-04-24 PROCEDURE — 84100 ASSAY OF PHOSPHORUS: CPT

## 2022-04-24 PROCEDURE — 0W3P8ZZ CONTROL BLEEDING IN GASTROINTESTINAL TRACT, VIA NATURAL OR ARTIFICIAL OPENING ENDOSCOPIC: ICD-10-PCS | Performed by: INTERNAL MEDICINE

## 2022-04-24 PROCEDURE — 99291 CRITICAL CARE FIRST HOUR: CPT | Performed by: ANESTHESIOLOGY

## 2022-04-24 RX ORDER — LIDOCAINE HYDROCHLORIDE 10 MG/ML
INJECTION, SOLUTION EPIDURAL; INFILTRATION; INTRACAUDAL; PERINEURAL AS NEEDED
Status: DISCONTINUED | OUTPATIENT
Start: 2022-04-24 | End: 2022-04-24

## 2022-04-24 RX ORDER — MAGNESIUM SULFATE 1 G/100ML
1 INJECTION INTRAVENOUS ONCE
Status: COMPLETED | OUTPATIENT
Start: 2022-04-24 | End: 2022-04-24

## 2022-04-24 RX ORDER — LANOLIN ALCOHOL/MO/W.PET/CERES
6 CREAM (GRAM) TOPICAL
Status: DISCONTINUED | OUTPATIENT
Start: 2022-04-24 | End: 2022-04-28 | Stop reason: HOSPADM

## 2022-04-24 RX ORDER — SODIUM CHLORIDE 9 MG/ML
INJECTION, SOLUTION INTRAVENOUS CONTINUOUS PRN
Status: DISCONTINUED | OUTPATIENT
Start: 2022-04-24 | End: 2022-04-24

## 2022-04-24 RX ORDER — PANTOPRAZOLE SODIUM 40 MG/1
INJECTION, POWDER, FOR SOLUTION INTRAVENOUS
Status: COMPLETED
Start: 2022-04-24 | End: 2022-04-24

## 2022-04-24 RX ORDER — PROPOFOL 10 MG/ML
INJECTION, EMULSION INTRAVENOUS AS NEEDED
Status: DISCONTINUED | OUTPATIENT
Start: 2022-04-24 | End: 2022-04-24

## 2022-04-24 RX ORDER — PANTOPRAZOLE SODIUM 40 MG/1
40 INJECTION, POWDER, FOR SOLUTION INTRAVENOUS EVERY 12 HOURS SCHEDULED
Status: DISCONTINUED | OUTPATIENT
Start: 2022-04-24 | End: 2022-04-25

## 2022-04-24 RX ORDER — ACETAMINOPHEN 325 MG/1
650 TABLET ORAL EVERY 6 HOURS PRN
Status: DISCONTINUED | OUTPATIENT
Start: 2022-04-24 | End: 2022-04-28 | Stop reason: HOSPADM

## 2022-04-24 RX ADMIN — PANTOPRAZOLE SODIUM 40 MG: 40 INJECTION, POWDER, FOR SOLUTION INTRAVENOUS at 21:28

## 2022-04-24 RX ADMIN — ACETAMINOPHEN 650 MG: 325 TABLET ORAL at 20:28

## 2022-04-24 RX ADMIN — INSULIN LISPRO 1 UNITS: 100 INJECTION, SOLUTION INTRAVENOUS; SUBCUTANEOUS at 06:29

## 2022-04-24 RX ADMIN — Medication 8 MG/HR: at 05:39

## 2022-04-24 RX ADMIN — PANTOPRAZOLE SODIUM 40 MG: 40 INJECTION, POWDER, FOR SOLUTION INTRAVENOUS at 12:06

## 2022-04-24 RX ADMIN — LIDOCAINE 1 PATCH: 50 PATCH TOPICAL at 09:20

## 2022-04-24 RX ADMIN — PROPOFOL 50 MG: 10 INJECTION, EMULSION INTRAVENOUS at 10:44

## 2022-04-24 RX ADMIN — PROPOFOL 50 MG: 10 INJECTION, EMULSION INTRAVENOUS at 10:51

## 2022-04-24 RX ADMIN — Medication 8 MG/HR: at 00:16

## 2022-04-24 RX ADMIN — SODIUM CHLORIDE: 0.9 INJECTION, SOLUTION INTRAVENOUS at 10:30

## 2022-04-24 RX ADMIN — Medication 6 MG: at 21:28

## 2022-04-24 RX ADMIN — INSULIN LISPRO 2 UNITS: 100 INJECTION, SOLUTION INTRAVENOUS; SUBCUTANEOUS at 17:15

## 2022-04-24 RX ADMIN — PANTOPRAZOLE SODIUM: 40 INJECTION, POWDER, LYOPHILIZED, FOR SOLUTION INTRAVENOUS at 05:43

## 2022-04-24 RX ADMIN — PROPOFOL 50 MG: 10 INJECTION, EMULSION INTRAVENOUS at 10:47

## 2022-04-24 RX ADMIN — PROPOFOL 50 MG: 10 INJECTION, EMULSION INTRAVENOUS at 10:41

## 2022-04-24 RX ADMIN — MAGNESIUM SULFATE HEPTAHYDRATE 1 G: 1 INJECTION, SOLUTION INTRAVENOUS at 06:29

## 2022-04-24 RX ADMIN — INSULIN LISPRO 2 UNITS: 100 INJECTION, SOLUTION INTRAVENOUS; SUBCUTANEOUS at 12:07

## 2022-04-24 RX ADMIN — LIDOCAINE HYDROCHLORIDE 50 MG: 10 INJECTION, SOLUTION EPIDURAL; INFILTRATION; INTRACAUDAL; PERINEURAL at 10:40

## 2022-04-24 NOTE — NURSING NOTE
Returned from procedure via bed on O2 via mask with anesthesia  Responsive but nonverbal and sedated  Maintaining airway  Vitals stable

## 2022-04-24 NOTE — ASSESSMENT & PLAN NOTE
· Sodium 132 on arrival, likely due to hypovolemic hyponatremia secondary to blood loss  · 1 liter NSS given in ED  · Transfused 2 units pRBC  · Resolved this morning, sodium now 139

## 2022-04-24 NOTE — NURSING NOTE
Pt is awake and generally oriented  Again very anxious  Changed to nasal cannula to improve comfort  Coughing and deep breathing exercises done for poor sats  O2 increased  Cough is strong and moist but non productive  Assessment is otherwise unchanged

## 2022-04-24 NOTE — ANESTHESIA POSTPROCEDURE EVALUATION
Post-Op Assessment Note    CV Status:  Stable  Pain Score: 0    Pain management: adequate     Mental Status:  Sleepy   Hydration Status:  Euvolemic   PONV Controlled:  Controlled   Airway Patency:  Patent      Post Op Vitals Reviewed: Yes      Staff: CRNA         No complications documented      BP  129/57   Temp      Pulse  93   Resp  16   SpO2   95

## 2022-04-24 NOTE — PROGRESS NOTES
[address]  Progress Note - Florence Koch 1/19/1929, 80 y o  female MRN: [id number]  Unit/Bed#: ICU 11-01 Encounter: [id number]  Primary Care Provider: Anny Sen MD   Date and time admitted to hospital: 4/23/2022 12:58 PM    * Upper GI bleed  Assessment & Plan  · Patient has a history of an upper GI "bleeding ulcer" approximately 10 years ago  · Developed a UGIB during hospitalization from 4/18/22-4/21/22   She was deemed too unstable for inpatient EGD and was recommended for outpatient EGD follow up which patient declined  · Discharged on Protonix daily  · On arrival patient appears pale and hemoglobin initially 7 2  · BUN elevated at 40  · Likely this is a slow ooze that has continued since last hospitalization as patient did not have EGD to evaluate to treat bleed  · Family is open to considering inpatient EGD if it is recommended   · Received 2 units pRBC     · Hemoglobin stable this morning at 8 8  · Monitor hemoglobin following transfusion then Q6h  · Continue Protonix gtt  · NPO  · EGD scheduled for today  · Consult GI, appreciate input    Shock (HCC)-resolved as of 4/24/2022  Assessment & Plan  · Two syncopal episodes prior to arrival likely secondary to hypotension +/- vasovagal as one occurring following a dark loose BM and the other occurred on standing  · SBP 70's-80's in the ED  · Likely hemorrhagic shock secondary to ABLA  · 1 liter crystalloids bolused in ED  · Norepinephrine was started in the ED  · 2 units pRBC given    · Weaned off norepinephrine yesterday night  · Now resolved, hemodynamically stable overnight  · Hemoglobin stable  · Monitor blood pressure    ABLA (acute blood loss anemia)  Assessment & Plan  · Patient presented with an initial hemoglobin of 7 2 and a baseline hemoglobin between 12-14  · Hemoglobin 8 5 on 4/21 prior to discharge, 1 3 gram drop  · Patient had declined outpatient EGD for UGIB during recent hospitalization and this is likely due to a slow ooze from history of bleeding ulcer  · Transfused 2 units pRBC  · Monitor hemoglobin Q6h  · Hemoglobin stable 8 8  · Transfuse for hemoglobin <7, symptomatic anemia or active bleeding    Elevated troponin  Assessment & Plan  · Troponin elevated on arrival at 320--> peaked at 763  · Noted to have been elevated on recent hospitalization as well and was determined to be non-MI elevation due to sepsis  · Likely secondary to demand ischemia from ABLA and hemorrhagic shock  · EKG unremarkable   · Trended down    SIRS (systemic inflammatory response syndrome) (HCC)  Assessment & Plan  · SIRS criteria met on arrival with tachycardia, tachypnea and leukocytosis  · Patient discharged 2 days prior on a 5 day course of Augmentin for RUL pneumonia and cystitis  · No indication of current infection procalcitonin now negative, UA and CXR are without indication of infection and patient is afebrile  · Monitor off antibiotics  · Procalcitonin pending  · Trend WBC and temperature curve    Encephalopathy-resolved as of 4/24/2022  Assessment & Plan  · POA with patient lethargic and altered  · CT head showed no acute intracranial abnormality  · Back to baseline mental status  · Monitor neuro exam    Compression fracture of body of thoracic vertebra (HCC)  Assessment & Plan  · Superior endplate compression fracture of the T12 vertebral body   Unclear if chronic  · Lidoderm patch to back Q12 hr  · Tylenol PRN  · PT/OT    Hyponatremia-resolved as of 4/24/2022  Assessment & Plan  · Sodium 132 on arrival, likely due to hypovolemic hyponatremia secondary to blood loss  · 1 liter NSS given in ED  · Transfused 2 units pRBC  · Resolved this morning, sodium now 139    Abnormal CT of the chest  Assessment & Plan  · CT chest revealed B/L interstitial lung disease likely usual interstitial pneumonia, superimposed diffuse ground glass opacities could represent cysts, pulmonary edema, or active inflammatory phase of underlying interstitial lung disease  Increased B/L pleural effusions  · Also noted intrathoracic stomach  · Per daughter these lung changes had been previously noted and an etiology has not been determined  · Consider pulmonary consult vs outpatient follow up    Chronic respiratory failure ([facility] )  Assessment & Plan  · Discharged on 3 L from hospitalized in December for pneumonia and per daughter patient has declined follow up with pulmonology to assess her for removal  · Unclear etiology, ILD noted on imaging  · Currently on baseline O2 requirements 2-3 L NC  · Monitor SPO2  · Consider pulmonary consult vs outpatient pulmonology consult if patient and family is interested in pursing workup    Diabetes mellitus type 2, insulin dependent [facility])  Assessment & Plan  Lab Results   Component Value Date    HGBA1C 7 2 05/06/2020       Recent Labs     04/23/22  1302 04/23/22  1753 04/24/22  0002 04/24/22  0549   POCGLU 301* 326* 132 170*       Blood Sugar Average: Last 72 hrs:  (P) 232 25   · Hold home 70/30 and Glucovance  · Continue accuchecks Q6 with SSI while NPO  · Hypoglycemia protocol  · Hgb A1c pending    Primary hypertension  Assessment & Plan  Hold home amlodipine and ibesartan    Other hyperlipidemia  Assessment & Plan  Continue statin    Hypothyroidism  Assessment & Plan  Continue synthroid    Cirrhosis ([facility] )  Assessment & Plan  · Noted on CT  · Previously noted on prior CT from 4/19/22, no previous abdominal imaging to compare to  · AST, ALT, Alk phos and Tbili WNL  · Abdominal exam benign  · Outpatient follow up      ----------------------------------------------------------------------------------------  HPI/24hr events: Patient was hemodynamically stable overnight following transfusion of 2 units pRBC  She was briefly on a lose dose of levophed while she was being resuscitated but that was quickly;y weaned off  Hemoglobin is stable in the 8's  Patient is NPO for EGD today      Patient appropriate for transfer out of the ICU today?: Patient does not meet criteria for referral to the ICU Follow-Up Clinic; referral has not been made  Disposition: Transfer to Med-Surg   Code Status: Level 3 - DNAR and DNI  ---------------------------------------------------------------------------------------  SUBJECTIVE  "I want to go back to sleep " Pt with history of dementia and short term memory loss    Review of Systems   Unable to perform ROS: Dementia     Review of systems was unable to be performed secondary to dementia   ---------------------------------------------------------------------------------------  OBJECTIVE    Vitals   Vitals:    22 0500 22 0538 22 0600 22 0700   BP: 105/57  111/57 (!) 96/49   Pulse: 93  93 94   Resp: 22  (!) 30 (!) 26   Temp:    97 5 °F (36 4 °C)   TempSrc:    Temporal   SpO2: 97%  96% 97%   Weight:  78 1 kg (172 lb 2 9 oz)     Height:         Temp (24hrs), Av 5 °F (36 9 °C), Min:97 3 °F (36 3 °C), Max:99 3 °F (37 4 °C)  Current: Temperature: 97 5 °F (36 4 °C)          Respiratory:  SpO2: SpO2: 97 %  Nasal Cannula O2 Flow Rate (L/min): 3 L/min    Invasive/non-invasive ventilation settings   Respiratory  Report   Lab Data (Last 4 hours)    None         O2/Vent Data (Last 4 hours)    None                Physical Exam  Constitutional:       Appearance: She is not ill-appearing  HENT:      Head: Normocephalic  Mouth/Throat:      Mouth: Mucous membranes are moist    Eyes:      Pupils: Pupils are equal, round, and reactive to light  Cardiovascular:      Rate and Rhythm: Normal rate and regular rhythm  Pulses: Normal pulses  Heart sounds: Murmur heard  Pulmonary:      Effort: Pulmonary effort is normal  No respiratory distress  Breath sounds: Normal breath sounds  Abdominal:      General: Bowel sounds are normal  There is no distension  Palpations: Abdomen is soft  There is no mass  Tenderness: There is no abdominal tenderness  There is no guarding     Musculoskeletal: General: Normal range of motion  Right lower leg: No edema  Left lower leg: No edema  Skin:     General: Skin is warm and dry  Capillary Refill: Capillary refill takes less than 2 seconds  Coloration: Skin is pale  Neurological:      General: No focal deficit present  Mental Status: She is alert  Mental status is at baseline  Comments: Oriented to self, short term memory loss at baseline             Laboratory and Diagnostics:  Results from last 7 days   Lab Units 04/24/22  0458 04/24/22  0040 04/23/22  1325 04/21/22  0509 04/20/22  0545 04/19/22  2340 04/19/22  1544 04/19/22  0807 04/19/22  0413 04/18/22  2301 04/18/22  2301   WBC Thousand/uL 10 72*  --  13 68* 8 92 11 92*  --   --   --  10 07  --  11 01*   HEMOGLOBIN g/dL 8 8* 9 0* 7 2* 8 5* 8 4* 8 1* 8 2*   < > 8 6*   < > 7 6*   HEMATOCRIT % 28 2* 29 6* 22 6* 28 4* 28 0*  --   --   --  27 7*  --  24 6*   PLATELETS Thousands/uL 216  --  277 247 234  --   --   --  240  --  286   NEUTROS PCT % 77*  --  82* 68 80*  --   --   --   --   --  75   BANDS PCT %  --   --   --   --   --   --   --   --  2  --   --    MONOS PCT % 8  --  8 9 8  --   --   --   --   --  7   MONO PCT %  --   --   --   --   --   --   --   --  1*  --   --     < > = values in this interval not displayed       Results from last 7 days   Lab Units 04/24/22  0458 04/23/22  1313 04/21/22  0509 04/20/22  0545 04/19/22  1544 04/19/22  0413 04/18/22  2301   SODIUM mmol/L 139 132* 136 135 132* 132* 131*   POTASSIUM mmol/L 4 4 5 3 4 4 4 6 4 2 5 4* 5 1   CHLORIDE mmol/L 107 100 103 100 97 97 97   CO2 mmol/L 26 21 28 28 22 20* 23   ANION GAP mmol/L 6 11 5 7 13 15* 11   BUN mg/dL 30* 40* 33* 43* 46* 47* 42*   CREATININE mg/dL 1 11 1 19 0 92 1 11 1 35* 1 34* 1 29   CALCIUM mg/dL 8 5 9 0 9 1 8 9 9 3 9 3 9 4   GLUCOSE RANDOM mg/dL 146* 289* 135 207* 294* 439* 295*   ALT U/L 4* 12  --   --   --   --  9   AST U/L 11* 22  --   --   --   --  15   ALK PHOS U/L 33* 36  --   --   -- --  50   ALBUMIN g/dL 3 2* 3 6  --   --   --   --  3 8   TOTAL BILIRUBIN mg/dL 0 75 0 55  --   --   --   --  0 58     Results from last 7 days   Lab Units 04/24/22  0458 04/21/22  0509 04/20/22  0545 04/19/22  1544 04/18/22  2301   MAGNESIUM mg/dL 1 9 2 0 2 0 1 7* 1 6*   PHOSPHORUS mg/dL 3 1 3 3 3 1 3 0  --       Results from last 7 days   Lab Units 04/23/22  1325 04/18/22  2310   INR  1 16 1 09   PTT seconds 36 30          Results from last 7 days   Lab Units 04/23/22  1909 04/19/22  0131 04/18/22  2301   LACTIC ACID mmol/L 1 9 4 1* 4 5*     ABG:    VBG:  Results from last 7 days   Lab Units 04/23/22  1715   PH CONOR  7 396   PCO2 CONOR mm Hg 33 9*   PO2 CONOR mm Hg 46 1*   HCO3 CONOR mmol/L 20 3*   BASE EXC CONOR mmol/L -3 9     Results from last 7 days   Lab Units 04/23/22  1325 04/20/22  0545 04/18/22  2310   PROCALCITONIN ng/ml 0 14 0 09 0 10       Micro  Results from last 7 days   Lab Units 04/23/22  1743 04/23/22  1713 04/19/22  2205 04/19/22  0217 04/18/22  2310 04/18/22  2301   BLOOD CULTURE  Received in Microbiology Lab  Culture in Progress  Received in Microbiology Lab  Culture in Progress  --   --  No Growth After 5 Days  No Growth After 5 Days  MRSA CULTURE ONLY   --   --  No Methicillin Resistant Staphlyococcus aureus (MRSA) isolated  --   --   --    LEGIONELLA URINARY ANTIGEN   --   --   --  Negative  --   --    STREP PNEUMONIAE ANTIGEN, URINE   --   --   --  Negative  --   --        EKG: Sinus rhythm  Imaging: I have personally reviewed pertinent reports  Intake and Output  I/O       04/22 0701 04/23 0700 04/23 0701 04/24 0700 04/24 0701 04/25 0700    P  O   0     Blood  362 5     IV Piggyback  231 7     Total Intake(mL/kg)  594 2 (7 6)     Urine (mL/kg/hr)  550     Stool  0     Total Output  550     Net  +44 2            Unmeasured Urine Occurrence  1 x     Unmeasured Stool Occurrence  1 x           Height and Weights   Height: 5' 4" (162 6 cm)     Body mass index is 29 55 kg/m²    Weight (last 2 days)     Date/Time Weight    04/24/22 0538 78 1 (172 18)    04/23/22 1614 78 1 (172 18)    04/23/22 1527 78 1 (172 18)    04/23/22 1300 78 5 (173)            Nutrition       Diet Orders   (From admission, onward)             Start     Ordered    04/23/22 1832  Diet NPO  Diet effective now        References:    Nutrtion Support Algorithm Enteral vs  Parenteral   Question Answer Comment   Diet Type NPO    RD to adjust diet per protocol?  No        04/23/22 1833                  Active Medications  Scheduled Meds:  Current Facility-Administered Medications   Medication Dose Route Frequency Provider Last Rate    insulin lispro  1-6 Units Subcutaneous Q6H [facility] Sole Trinway, CRNP      levothyroxine  100 mcg Oral Daily Sole Trinway, CRNP      lidocaine  1 patch Topical Daily Sole Trinway, CRNP      ondansetron  4 mg Intravenous Q8H PRN Ellen Camacho PA-C      pantoprozole (PROTONIX) infusion (Continuous)  8 mg/hr Intravenous Continuous Sole Trinway, CRNP 8 mg/hr (04/24/22 0539)    pravastatin  40 mg Oral Daily Sole Trinway, CRNP       Continuous Infusions:  pantoprozole (PROTONIX) infusion (Continuous), 8 mg/hr, Last Rate: 8 mg/hr (04/24/22 0539)      PRN Meds:   ondansetron, 4 mg, Q8H PRN        Invasive Devices Review  Invasive Devices  Report    Peripheral Intravenous Line            Peripheral IV 04/23/22 Left Hand 1 day    Peripheral IV 04/23/22 Right Antecubital 1 day    Peripheral IV 04/23/22 Left;Proximal;Ventral (anterior) Forearm <1 day    Peripheral IV 04/24/22 Right;Ventral (anterior) Forearm <1 day                Rationale for remaining devices: N/A  ---------------------------------------------------------------------------------------  Advance Directive and Living Will:      Power of :    POLST:    ---------------------------------------------------------------------------------------  Care Time Delivered:   No Critical Care time spent       Sole Robledo, CRNP      Portions of the record may have been created with voice recognition software  Occasional wrong word or "sound a like" substitutions may have occurred due to the inherent limitations of voice recognition software    Read the chart carefully and recognize, using context, where substitutions have occurred

## 2022-04-24 NOTE — PROGRESS NOTES
[id number]: Received report from day shift RN  Assisted with taking her off bedpan & doing complete bed bath/linen change  Blood tranfusion infusing without issue  2200: Blood transfusion completed  Vital signs remain within limits  MAPs maintaining >65 without use of levophed  Sats 96% on 3L  Denies pain or shortness of breath  Pt falling back to sleep  0040: Hgb & Troponin collected & sent  Hgb resulted back at 9; troponin elevated at 763      0455: Morning labs resulted; hgb 8 8, trop trending down at 738  Pt voided on bedpan  Smear of stool; appeared brown/black       4233: 1 gm mag bolus started; mg 1 9 on labs this am

## 2022-04-24 NOTE — ASSESSMENT & PLAN NOTE
Lab Results   Component Value Date    HGBA1C 7 2 05/06/2020       Recent Labs     04/23/22  1302 04/23/22  1753 04/24/22  0002 04/24/22  0549   POCGLU 301* 326* 132 170*       Blood Sugar Average: Last 72 hrs:  (P) 232 25   · Hold home 70/30 and Glucovance  · Continue accuchecks Q6 with SSI while NPO  · Hypoglycemia protocol  · Hgb A1c pending

## 2022-04-24 NOTE — CONSULTS
Consultation - 126 [facility]  Bety Wheat 80 y o  female MRN: [id number]  Unit/Bed#: ICU 11-01 Encounter: [id number]        Consults    Reason for Consult / Principal Problem:   Symptomatic anemia  ASSESSMENT AND PLAN:    This is a 81 yo woman with 2nd admission for severe symptomatic anemia in 1 week  She has history of AVMs  I have discussed the situation in detail with her family, including her daughter who used to be a GI nurse  We are planning EGD today  I have some concerns about her respiratory status and if necessary, she may be intubated  I feel that given severe anemia and ongoing blood losses, EGD has to be done  I discussed the risks of bleeding, infection, and perforation associated with endoscopic procedures  ______________________________________________________________________    HPI:  81 yo woman with dementia and history of gastric AVM presenting with symptomatic anemia  She was admitted earlier in the week due to weakness and anemia, but also had PNA at that time and her Hgb stabilized, so EGD was not done  She is on home O2 since PNA in December  CT suggests ILD  She was very weak and synopsized at her assisted living facility  Stools have been dark but no melanotic  Hgb 7 2 on admission (baseline 12 in 12/2021)  She received 2 units PRBC and is 8 8 this morning  Initially hypotensive and required levophed, now stable  Not on NSAIDs  REVIEW OF SYSTEMS:    CONSTITUTIONAL: Denies any fever, chills, rigors, and weight loss  HEENT: No earache or tinnitus  Denies hearing loss or visual disturbances  CARDIOVASCULAR: No chest pain or palpitations  RESPIRATORY: Denies any cough, hemoptysis, shortness of breath or dyspnea on exertion  GASTROINTESTINAL: As noted in the History of Present Illness  GENITOURINARY: No problems with urination  Denies any hematuria or dysuria  NEUROLOGIC: No dizziness or vertigo, denies headaches     MUSCULOSKELETAL: Denies any muscle or joint pain  SKIN: Denies skin rashes or itching  ENDOCRINE: Denies excessive thirst  Denies intolerance to heat or cold  PSYCHOSOCIAL: Denies depression or anxiety  Denies any recent memory loss         Historical Information   Past Medical History:   Diagnosis Date    Anemia     Arthritis     Basal cell carcinoma of skin of face     Bleeding ulcer     Diabetes mellitus ([address] )     Hypercholesterolemia     Hypertension     Hypothyroidism     Insulin dependent diabetes mellitus      Past Surgical History:   Procedure Laterality Date    CATARACT EXTRACTION Bilateral     MOHS SURGERY      REPLACEMENT TOTAL KNEE Right     SINUS SURGERY      TONSILLECTOMY      WISDOM TOOTH EXTRACTION       Social History   Social History     Substance and Sexual Activity   Alcohol Use Never     Social History     Substance and Sexual Activity   Drug Use No     Social History     Tobacco Use   Smoking Status Never Smoker   Smokeless Tobacco Never Used     Family History   Problem Relation Age of Onset    Lung cancer Sister     Diabetes Mother     Cirrhosis Father        Meds/Allergies     Medications Prior to Admission   Medication    amLODIPine (NORVASC) 10 mg tablet    amoxicillin-clavulanate (AUGMENTIN) 875-125 mg per tablet    glucose blood (ONE TOUCH ULTRA TEST) test strip    glyBURIDE-metFORMIN (GLUCOVANCE) 5-500 MG per tablet    insulin aspart protamine-insulin aspart (NovoLOG Mix 70/30 FlexPen) 100 Units/mL injection pen    Insulin Pen Needle (PEN NEEDLES) 32G X 4 MM MISC    irbesartan (AVAPRO) 150 mg tablet    levothyroxine 100 mcg tablet    pantoprazole (PROTONIX) 40 mg tablet    potassium chloride (K-DUR) 10 mEq tablet    pravastatin (PRAVACHOL) 40 mg tablet     Current Facility-Administered Medications   Medication Dose Route Frequency    insulin lispro (HumaLOG) 100 units/mL subcutaneous injection 1-6 Units  1-6 Units Subcutaneous Q6H [facility] & long-term    levothyroxine tablet 100 mcg 100 mcg Oral Daily    lidocaine (LIDODERM) 5 % patch 1 patch  1 patch Topical Daily    ondansetron (ZOFRAN) injection 4 mg  4 mg Intravenous Q8H PRN    pantoprazole (PROTONIX) 80 mg in sodium chloride 0 9 % 100 mL infusion  8 mg/hr Intravenous Continuous    pravastatin (PRAVACHOL) tablet 40 mg  40 mg Oral Daily       Allergies   Allergen Reactions    Iv Dye [Iodinated Diagnostic Agents]     Percocet [Oxycodone-Acetaminophen]     Phenobarbital     Statins            Objective     Blood pressure (!) 96/49, pulse 94, temperature 97 5 °F (36 4 °C), temperature source Temporal, resp  rate (!) 26, height 5' 4" (1 626 m), weight 78 1 kg (172 lb 2 9 oz), SpO2 97 %  Body mass index is 29 55 kg/m²  Intake/Output Summary (Last 24 hours) at 4/24/2022 0958  Last data filed at 4/24/2022 0522  Gross per 24 hour   Intake 594 17 ml   Output 550 ml   Net 44 17 ml         PHYSICAL EXAM:      General Appearance:   Alert, cooperative, no distress   HEENT:   Normocephalic, atraumatic, anicteric      Neck:  Supple, symmetrical, trachea midline   Lungs:   On 2 L NC   Heart[de-identified]   Regular rate and rhythm; no murmur, rub, or gallop     Abdomen:   Soft, non-tender, non-distended; normal bowel sounds; no masses, no organomegaly    Genitalia:   Deferred    Rectal:   brown stool   Extremities:  No cyanosis, clubbing or edema    Pulses:  2+ and symmetric all extremities    Skin:  No jaundice, rashes, or lesions    Lymph nodes:  No palpable cervical lymphadenopathy        Lab Results:   Admission on 04/23/2022   Component Date Value    POC Glucose 04/23/2022 301*    WBC 04/23/2022 13 68*    RBC 04/23/2022 2 40*    Hemoglobin 04/23/2022 7 2*    Hematocrit 04/23/2022 22 6*    MCV 04/23/2022 94     MCH 04/23/2022 30 0     MCHC 04/23/2022 31 9     RDW 04/23/2022 18 6*    MPV 04/23/2022 11 7     Platelets 13/10/3818 277     nRBC 04/23/2022 0     Neutrophils Relative 04/23/2022 82*    Immat GRANS % 04/23/2022 1     Lymphocytes Relative 04/23/2022 8*    Monocytes Relative 04/23/2022 8     Eosinophils Relative 04/23/2022 1     Basophils Relative 04/23/2022 0     Neutrophils Absolute 04/23/2022 11 23*    Immature Grans Absolute 04/23/2022 0 11     Lymphocytes Absolute 04/23/2022 1 12     Monocytes Absolute 04/23/2022 1 12     Eosinophils Absolute 04/23/2022 0 07     Basophils Absolute 04/23/2022 0 03     Sodium 04/23/2022 132*    Potassium 04/23/2022 5 3     Chloride 04/23/2022 100     CO2 04/23/2022 21     ANION GAP 04/23/2022 11     BUN 04/23/2022 40*    Creatinine 04/23/2022 1 19     Glucose 04/23/2022 289*    Calcium 04/23/2022 9 0     AST 04/23/2022 22     ALT 04/23/2022 12     Alkaline Phosphatase 04/23/2022 36     Total Protein 04/23/2022 6 5     Albumin 04/23/2022 3 6     Total Bilirubin 04/23/2022 0 55     eGFR 04/23/2022 39     Procalcitonin 04/23/2022 0 14     Protime 04/23/2022 14 6*    INR 04/23/2022 1 16     PTT 04/23/2022 36     Blood Culture 04/23/2022 Received in Microbiology Lab  Culture in Progress   Blood Culture 04/23/2022 Received in Microbiology Lab  Culture in Progress       Color, UA 04/23/2022 Yellow     Clarity, UA 04/23/2022 Clear     Specific Gravity, UA 04/23/2022 >=1 030*    pH, UA 04/23/2022 5 0     Leukocytes, UA 04/23/2022 Negative     Nitrite, UA 04/23/2022 Negative     Protein, UA 04/23/2022 Negative     Glucose, UA 04/23/2022 Negative     Ketones, UA 04/23/2022 Trace*    Urobilinogen, UA 04/23/2022 0 2     Bilirubin, UA 04/23/2022 Negative     Blood, UA 04/23/2022 2+*    hs TnI 0hr 04/23/2022 320*    ABO Grouping 04/23/2022 O     Rh Factor 04/23/2022 Positive     Antibody Screen 04/23/2022 Negative     Specimen Expiration Date 04/23/2022 31179444     pH, Tayo 04/23/2022 7 471*    pCO2, [facility] 04/23/2022 28 9*    pO2, [facility] 04/23/2022 35 5     HCO3, [facility] 04/23/2022 20 6*    Base Excess, [facility] 04/23/2022 -2 5     O2 Content, [facility] 04/23/2022 7 7     O2 HGB, VENOUS 2022 67 6     Lipase 2022 30     Unit Product Code 2022      Unit Number 2022 [id number]     Unit ABO 2022 O     Unit RH 2022 POS     Unit Dispense Status 2022 Transfused     Unit Product Volume 2022 350     Unit Product Code 2022 D4226P48     Unit Number 2022 [id number]     Unit ABO 2022 O     Unit RH 2022 POS     Unit Dispense Status 2022 Return to Inv     Unit Product Volume 2022 350     Crossmatch 2022 Compatible     Crossmatch 2022 Compatible     RBC, UA 2022 0-1     WBC, UA 2022 4-10*    Epithelial Cells 2022 Occasional     Bacteria, UA 2022 Occasional     hs TnI 4hr 2022 395*    Delta 4hr hsTnI 2022 75*    Unit Product Code 2022 [id number]     Unit Number 2022 [id number]     Unit ABO 2022 O     Unit RH 2022 POS     Crossmatch 2022 Compatible     Unit Dispense Status 2022 Presumed Trans     Unit Product Volume 2022 350     pH, Tayo 2022 7  396     pCO2, Tayo 2022 33 9*    pO2, Tayo 2022 46 1*    HCO3, Tayo 2022 20 3*    Base Excess, Tayo 2022 -3 9     O2 Content, Tayo 2022 11 2     O2 HGB, VENOUS 2022 79 1     LACTIC ACID 2022 1 9     Hemoglobin 2022 9 0*    Hematocrit 2022 29 6*    HS TnI random 2022 763*    POC Glucose 2022 326*    POC Glucose 2022 132     hs TnI 0hr 2022 738*    WBC 2022 10 72*    RBC 2022 2 91*    Hemoglobin 2022 8 8*    Hematocrit 2022 28 2*    MCV 2022 97     MCH 2022 30 2     MCHC 2022 31 2*    RDW 2022 18 3*    MPV 2022 10 5     Platelets  216     nRBC 2022 0     Neutrophils Relative 2022 77*    Immat GRANS % 2022 1     Lymphocytes Relative 2022 12*    Monocytes Relative 04/24/2022 8     Eosinophils Relative 04/24/2022 2     Basophils Relative 04/24/2022 0     Neutrophils Absolute 04/24/2022 8 36*    Immature Grans Absolute 04/24/2022 0 05     Lymphocytes Absolute 04/24/2022 1 27     Monocytes Absolute 04/24/2022 0 84     Eosinophils Absolute 04/24/2022 0 17     Basophils Absolute 04/24/2022 0 03     Sodium 04/24/2022 139     Potassium 04/24/2022 4 4     Chloride 04/24/2022 107     CO2 04/24/2022 26     ANION GAP 04/24/2022 6     BUN 04/24/2022 30*    Creatinine 04/24/2022 1 11     Glucose 04/24/2022 146*    Calcium 04/24/2022 8 5     Corrected Calcium 04/24/2022 9 1     AST 04/24/2022 11*    ALT 04/24/2022 4*    Alkaline Phosphatase 04/24/2022 33*    Total Protein 04/24/2022 5 9*    Albumin 04/24/2022 3 2*    Total Bilirubin 04/24/2022 0 75     eGFR 04/24/2022 42     Magnesium 04/24/2022 1 9     Phosphorus 04/24/2022 3 1     Calcium, Ionized 04/24/2022 1 13     POC Glucose 04/24/2022 170*    hs TnI 2hr 04/24/2022 593*    Delta 2hr hsTnI 04/24/2022 -145     Procalcitonin 04/24/2022 0 12        Imaging Studies: I have personally reviewed pertinent imaging studies

## 2022-04-24 NOTE — ASSESSMENT & PLAN NOTE
· POA with patient lethargic and altered  · CT head showed no acute intracranial abnormality  · Back to baseline mental status  · Monitor neuro exam

## 2022-04-24 NOTE — ASSESSMENT & PLAN NOTE
· Troponin elevated on arrival at 320--> peaked at 763  · Noted to have been elevated on recent hospitalization as well and was determined to be non-MI elevation due to sepsis  · Likely secondary to demand ischemia from ABLA and hemorrhagic shock  · EKG unremarkable   · Trended down

## 2022-04-24 NOTE — ANESTHESIA PREPROCEDURE EVALUATION
Procedure:  EGD    Relevant Problems   CARDIO   (+) Other hyperlipidemia   (+) Primary hypertension      ENDO   (+) Diabetes mellitus type 2, insulin dependent (HCC)   (+) Hypothyroidism      GI/HEPATIC   (+) Cirrhosis (HCC)   (+) Upper GI bleed      HEMATOLOGY   (+) ABLA (acute blood loss anemia)      PULMONARY   (+) Acute respiratory failure with hypoxia (HCC)   (+) Chronic respiratory failure (HCC)   (+) Right upper lobe pneumonia        Physical Exam    Airway    Mallampati score: II  TM Distance: >3 FB  Neck ROM: full     Dental   No notable dental hx     Cardiovascular  Rhythm: regular, Rate: normal,     Pulmonary  Pulmonary exam normal Breath sounds clear to auscultation, No rhonchi, Decreased breath sounds, No wheezes,     Other Findings        Anesthesia Plan  ASA Score- 3     Anesthesia Type- IV sedation with anesthesia with ASA Monitors  Additional Monitors:   Airway Plan: NTT  Plan Factors-Exercise tolerance (METS): >4 METS  Chart reviewed  EKG reviewed  Imaging results reviewed  Existing labs reviewed  Patient summary reviewed  Patient is not a current smoker  Induction- intravenous  Postoperative Plan-     Informed Consent- Anesthetic plan and risks discussed with patient and daughter  I personally reviewed this patient with the CRNA  Discussed and agreed on the Anesthesia Plan with the CRNA  Jose R Medina

## 2022-04-24 NOTE — ASSESSMENT & PLAN NOTE
Dr Bettencourt checked cmp 11/16/18 and creatinine was normal. Do you want to repeat it? Please advise on order if necessary.   · CT chest revealed B/L interstitial lung disease likely usual interstitial pneumonia, superimposed diffuse ground glass opacities could represent cysts, pulmonary edema, or active inflammatory phase of underlying interstitial lung disease   Increased B/L pleural effusions  · Also noted intrathoracic stomach  · Per daughter these lung changes had been previously noted and an etiology has not been determined  · Consider pulmonary consult vs outpatient follow up

## 2022-04-24 NOTE — ASSESSMENT & PLAN NOTE
· Patient has a history of an upper GI "bleeding ulcer" approximately 10 years ago  · Developed a UGIB during hospitalization from 4/18/22-4/21/22   She was deemed too unstable for inpatient EGD and was recommended for outpatient EGD follow up which patient declined  · Discharged on Protonix daily  · On arrival patient appears pale and hemoglobin initially 7 2  · BUN elevated at 40  · Likely this is a slow ooze that has continued since last hospitalization as patient did not have EGD to evaluate to treat bleed  · Family is open to considering inpatient EGD if it is recommended   · Received 2 units pRBC     · Hemoglobin stable this morning at 8 8  · Monitor hemoglobin following transfusion then Q6h  · Continue Protonix gtt  · NPO  · EGD scheduled for today  · Consult GI, appreciate input

## 2022-04-24 NOTE — ASSESSMENT & PLAN NOTE
· Discharged on 3 L from hospitalized in December for pneumonia and per daughter patient has declined follow up with pulmonology to assess her for removal  · Unclear etiology, ILD noted on imaging  · Currently on baseline O2 requirements 2-3 L NC  · Monitor SPO2  · Consider pulmonary consult vs outpatient pulmonology consult if patient and family is interested in pursing workup

## 2022-04-24 NOTE — ASSESSMENT & PLAN NOTE
· Patient presented with an initial hemoglobin of 7 2 and a baseline hemoglobin between 12-14  · Hemoglobin 8 5 on 4/21 prior to discharge, 1 3 gram drop  · Patient had declined outpatient EGD for UGIB during recent hospitalization and this is likely due to a slow ooze from history of bleeding ulcer  · Transfused 2 units pRBC  · Monitor hemoglobin Q6h  · Hemoglobin stable 8 8  · Transfuse for hemoglobin <7, symptomatic anemia or active bleeding

## 2022-04-24 NOTE — ASSESSMENT & PLAN NOTE
· Two syncopal episodes prior to arrival likely secondary to hypotension +/- vasovagal as one occurring following a dark loose BM and the other occurred on standing  · SBP 70's-80's in the ED  · Likely hemorrhagic shock secondary to ABLA  · 1 liter crystalloids bolused in ED  · Norepinephrine was started in the ED  · 2 units pRBC given    · Weaned off norepinephrine yesterday night  · Now resolved, hemodynamically stable overnight  · Hemoglobin stable  · Monitor blood pressure

## 2022-04-24 NOTE — PLAN OF CARE
Problem: PAIN - ADULT  Goal: Verbalizes/displays adequate comfort level or baseline comfort level  Description: Interventions:  - Encourage patient to monitor pain and request assistance  - Assess pain using appropriate pain scale  - Administer analgesics based on type and severity of pain and evaluate response  - Implement non-pharmacological measures as appropriate and evaluate response  - Consider cultural and social influences on pain and pain management  - Notify physician/advanced practitioner if interventions unsuccessful or patient reports new pain  Outcome: Progressing     Problem: INFECTION - ADULT  Goal: Absence or prevention of progression during hospitalization  Description: INTERVENTIONS:  - Assess and monitor for signs and symptoms of infection  - Monitor lab/diagnostic results  - Monitor all insertion sites, i e  indwelling lines, tubes, and drains  - Monitor endotracheal if appropriate and nasal secretions for changes in amount and color  - Purdys appropriate cooling/warming therapies per order  - Administer medications as ordered  - Instruct and encourage patient and family to use good hand hygiene technique  - Identify and instruct in appropriate isolation precautions for identified infection/condition  Outcome: Progressing  Goal: Absence of fever/infection during neutropenic period  Description: INTERVENTIONS:  - Monitor WBC    Outcome: Progressing     Problem: SAFETY ADULT  Goal: Patient will remain free of falls  Description: INTERVENTIONS:  - Educate patient/family on patient safety including physical limitations  - Instruct patient to call for assistance with activity   - Consult OT/PT to assist with strengthening/mobility   - Keep Call bell within reach  - Keep bed low and locked with side rails adjusted as appropriate  - Keep care items and personal belongings within reach  - Initiate and maintain comfort rounds  - Make Fall Risk Sign visible to staff  - Offer Toileting every 2 Hours, in advance of need  - Initiate/Maintain 2alarm  - Apply yellow socks and bracelet for high fall risk patients  - Consider moving patient to room near nurses station  Outcome: Progressing  Goal: Maintain or return to baseline ADL function  Description: INTERVENTIONS:  -  Assess patient's ability to carry out ADLs; assess patient's baseline for ADL function and identify physical deficits which impact ability to perform ADLs (bathing, care of mouth/teeth, toileting, grooming, dressing, etc )  - Assess/evaluate cause of self-care deficits   - Assess range of motion  - Assess patient's mobility; develop plan if impaired  - Assess patient's need for assistive devices and provide as appropriate  - Encourage maximum independence but intervene and supervise when necessary  - Involve family in performance of ADLs  - Assess for home care needs following discharge   - Consider OT consult to assist with ADL evaluation and planning for discharge  - Provide patient education as appropriate  Outcome: Progressing  Goal: Maintains/Returns to pre admission functional level  Description: INTERVENTIONS:  - Perform BMAT or MOVE assessment daily    - Set and communicate daily mobility goal to care team and patient/family/caregiver  - Collaborate with rehabilitation services on mobility goals if consulted  - Perform Range of Motion 3 times a day  - Reposition patient every 2 hours    - Dangle patient 2 times a day  - Stand patient 2 times a day  - Ambulate patient 2 times a day  - Out of bed to chair 2 times a day   - Out of bed for meals 2 times a day  - Out of bed for toileting  - Record patient progress and toleration of activity level   Outcome: Progressing     Problem: DISCHARGE PLANNING  Goal: Discharge to home or other facility with appropriate resources  Description: INTERVENTIONS:  - Identify barriers to discharge w/patient and caregiver  - Arrange for needed discharge resources and transportation as appropriate  - Identify discharge learning needs (meds, wound care, etc )  - Arrange for interpretive services to assist at discharge as needed  - Refer to Case Management Department for coordinating discharge planning if the patient needs post-hospital services based on physician/advanced practitioner order or complex needs related to functional status, cognitive ability, or social support system  Outcome: Progressing     Problem: MOBILITY - ADULT  Goal: Maintain or return to baseline ADL function  Description: INTERVENTIONS:  -  Assess patient's ability to carry out ADLs; assess patient's baseline for ADL function and identify physical deficits which impact ability to perform ADLs (bathing, care of mouth/teeth, toileting, grooming, dressing, etc )  - Assess/evaluate cause of self-care deficits   - Assess range of motion  - Assess patient's mobility; develop plan if impaired  - Assess patient's need for assistive devices and provide as appropriate  - Encourage maximum independence but intervene and supervise when necessary  - Involve family in performance of ADLs  - Assess for home care needs following discharge   - Consider OT consult to assist with ADL evaluation and planning for discharge  - Provide patient education as appropriate  Outcome: Progressing  Goal: Maintains/Returns to pre admission functional level  Description: INTERVENTIONS:  - Perform BMAT or MOVE assessment daily    - Set and communicate daily mobility goal to care team and patient/family/caregiver  - Collaborate with rehabilitation services on mobility goals if consulted  - Perform Range of Motion 3 times a day  - Reposition patient every 2 hours    - Dangle patient 2 times a day  - Stand patient 2 times a day  - Ambulate patient 2 times a day  - Out of bed to chair 2 times a day   - Out of bed for meals 2 times a day  - Out of bed for toileting  - Record patient progress and toleration of activity level   Outcome: Progressing     Problem: Potential for Falls  Goal: Patient will remain free of falls  Description: INTERVENTIONS:  - Educate patient/family on patient safety including physical limitations  - Instruct patient to call for assistance with activity   - Consult OT/PT to assist with strengthening/mobility   - Keep Call bell within reach  - Keep bed low and locked with side rails adjusted as appropriate  - Keep care items and personal belongings within reach  - Initiate and maintain comfort rounds  - Make Fall Risk Sign visible to staff  - Offer Toileting every 2 Hours, in advance of need  - Initiate/Maintain bed alarm    - Apply yellow socks and bracelet for high fall risk patients  - Consider moving patient to room near nurses station  Outcome: Progressing     Problem: Prexisting or High Potential for Compromised Skin Integrity  Goal: Skin integrity is maintained or improved  Description: INTERVENTIONS:  - Identify patients at risk for skin breakdown  - Assess and monitor skin integrity  - Assess and monitor nutrition and hydration status  - Monitor labs   - Assess for incontinence   - Turn and reposition patient  - Assist with mobility/ambulation  - Relieve pressure over bony prominences  - Avoid friction and shearing  - Provide appropriate hygiene as needed including keeping skin clean and dry  - Evaluate need for skin moisturizer/barrier cream  - Collaborate with interdisciplinary team   - Patient/family teaching  - Consider wound care consult   Outcome: Progressing

## 2022-04-24 NOTE — NURSING NOTE
Pt is drowsy with mild disorientation to time  Generally weak  Moves feebly in bed  Denies pain  Anxious at times  Noted to have moderate right lower facial droop  Family state this is from surgery in the past   AP made aware  No other focal neuro deficit  Heart tones are very distant with murmur  Pulses are palpable  Trace edema to bilateral ankles  Color is somewhat pale  Skin is warm and dry  Lungs are diminished with scattered crackles  Upper airway wheezes noted  Respirations are mildly labored at rest with increased dyspnea on exertion  Tolerating O2 via nasal cannula  Abdomen is large and soft with active bowel sounds  No BM observed  No void yet seen this shift  IV sites intact but all with no blood return  Giselle area is reddened  Giselle care provided  Dressing to left anterior lower leg intact

## 2022-04-24 NOTE — NURSING NOTE
Incontinent of a large watery melanic stool with black and red streaks and visible blood clots  skin care provided  Continent of clear yellow urine  Passed prefeeding assessment

## 2022-04-24 NOTE — ASSESSMENT & PLAN NOTE
· SIRS criteria met on arrival with tachycardia, tachypnea and leukocytosis  · Patient discharged 2 days prior on a 5 day course of Augmentin for RUL pneumonia and cystitis  · No indication of current infection procalcitonin now negative, UA and CXR are without indication of infection and patient is afebrile  · Monitor off antibiotics  · Procalcitonin pending  · Trend WBC and temperature curve

## 2022-04-25 LAB
ALBUMIN SERPL BCP-MCNC: 3.2 G/DL (ref 3.5–5)
ALP SERPL-CCNC: 37 U/L (ref 34–104)
ALT SERPL W P-5'-P-CCNC: 8 U/L (ref 7–52)
ANION GAP SERPL CALCULATED.3IONS-SCNC: 6 MMOL/L (ref 4–13)
ANION GAP SERPL CALCULATED.3IONS-SCNC: 6 MMOL/L (ref 4–13)
AST SERPL W P-5'-P-CCNC: 14 U/L (ref 13–39)
ATRIAL RATE: 92 BPM
ATRIAL RATE: 96 BPM
BASOPHILS # BLD AUTO: 0.02 THOUSANDS/ΜL (ref 0–0.1)
BASOPHILS NFR BLD AUTO: 0 % (ref 0–1)
BILIRUB SERPL-MCNC: 0.94 MG/DL (ref 0.2–1)
BUN SERPL-MCNC: 23 MG/DL (ref 5–25)
BUN SERPL-MCNC: 23 MG/DL (ref 5–25)
CALCIUM ALBUM COR SERPL-MCNC: 9.4 MG/DL (ref 8.3–10.1)
CALCIUM SERPL-MCNC: 8.5 MG/DL (ref 8.4–10.2)
CALCIUM SERPL-MCNC: 8.8 MG/DL (ref 8.4–10.2)
CHLORIDE SERPL-SCNC: 101 MMOL/L (ref 96–108)
CHLORIDE SERPL-SCNC: 105 MMOL/L (ref 96–108)
CO2 SERPL-SCNC: 26 MMOL/L (ref 21–32)
CO2 SERPL-SCNC: 27 MMOL/L (ref 21–32)
CREAT SERPL-MCNC: 0.93 MG/DL (ref 0.6–1.3)
CREAT SERPL-MCNC: 0.99 MG/DL (ref 0.6–1.3)
EOSINOPHIL # BLD AUTO: 0.22 THOUSAND/ΜL (ref 0–0.61)
EOSINOPHIL NFR BLD AUTO: 2 % (ref 0–6)
ERYTHROCYTE [DISTWIDTH] IN BLOOD BY AUTOMATED COUNT: 18.4 % (ref 11.6–15.1)
GFR SERPL CREATININE-BSD FRML MDRD: 49 ML/MIN/1.73SQ M
GFR SERPL CREATININE-BSD FRML MDRD: 53 ML/MIN/1.73SQ M
GLUCOSE SERPL-MCNC: 142 MG/DL (ref 65–140)
GLUCOSE SERPL-MCNC: 143 MG/DL (ref 65–140)
GLUCOSE SERPL-MCNC: 167 MG/DL (ref 65–140)
GLUCOSE SERPL-MCNC: 167 MG/DL (ref 65–140)
GLUCOSE SERPL-MCNC: 206 MG/DL (ref 65–140)
GLUCOSE SERPL-MCNC: 238 MG/DL (ref 65–140)
GLUCOSE SERPL-MCNC: 239 MG/DL (ref 65–140)
HCT VFR BLD AUTO: 28.6 % (ref 34.8–46.1)
HGB BLD-MCNC: 8.4 G/DL (ref 11.5–15.4)
HGB BLD-MCNC: 8.6 G/DL (ref 11.5–15.4)
HGB BLD-MCNC: 8.6 G/DL (ref 11.5–15.4)
IMM GRANULOCYTES # BLD AUTO: 0.05 THOUSAND/UL (ref 0–0.2)
IMM GRANULOCYTES NFR BLD AUTO: 1 % (ref 0–2)
LYMPHOCYTES # BLD AUTO: 0.96 THOUSANDS/ΜL (ref 0.6–4.47)
LYMPHOCYTES NFR BLD AUTO: 9 % (ref 14–44)
MAGNESIUM SERPL-MCNC: 1.8 MG/DL (ref 1.9–2.7)
MAGNESIUM SERPL-MCNC: 2.2 MG/DL (ref 1.9–2.7)
MCH RBC QN AUTO: 30 PG (ref 26.8–34.3)
MCHC RBC AUTO-ENTMCNC: 30.1 G/DL (ref 31.4–37.4)
MCV RBC AUTO: 100 FL (ref 82–98)
MONOCYTES # BLD AUTO: 0.93 THOUSAND/ΜL (ref 0.17–1.22)
MONOCYTES NFR BLD AUTO: 8 % (ref 4–12)
NEUTROPHILS # BLD AUTO: 8.85 THOUSANDS/ΜL (ref 1.85–7.62)
NEUTS SEG NFR BLD AUTO: 80 % (ref 43–75)
NRBC BLD AUTO-RTO: 0 /100 WBCS
P AXIS: 53 DEGREES
P AXIS: 53 DEGREES
PHOSPHATE SERPL-MCNC: 2.6 MG/DL (ref 2.3–4.1)
PHOSPHATE SERPL-MCNC: 3.2 MG/DL (ref 2.3–4.1)
PLATELET # BLD AUTO: 197 THOUSANDS/UL (ref 149–390)
PMV BLD AUTO: 10.4 FL (ref 8.9–12.7)
POTASSIUM SERPL-SCNC: 4.4 MMOL/L (ref 3.5–5.3)
POTASSIUM SERPL-SCNC: 4.9 MMOL/L (ref 3.5–5.3)
PR INTERVAL: 168 MS
PR INTERVAL: 180 MS
PROT SERPL-MCNC: 5.9 G/DL (ref 6.4–8.4)
QRS AXIS: -65 DEGREES
QRS AXIS: -69 DEGREES
QRSD INTERVAL: 132 MS
QRSD INTERVAL: 136 MS
QT INTERVAL: 398 MS
QT INTERVAL: 416 MS
QTC INTERVAL: 502 MS
QTC INTERVAL: 514 MS
RBC # BLD AUTO: 2.87 MILLION/UL (ref 3.81–5.12)
SODIUM SERPL-SCNC: 134 MMOL/L (ref 135–147)
SODIUM SERPL-SCNC: 137 MMOL/L (ref 135–147)
T WAVE AXIS: 14 DEGREES
T WAVE AXIS: 36 DEGREES
VENTRICULAR RATE: 92 BPM
VENTRICULAR RATE: 96 BPM
WBC # BLD AUTO: 11.03 THOUSAND/UL (ref 4.31–10.16)

## 2022-04-25 PROCEDURE — 83735 ASSAY OF MAGNESIUM: CPT | Performed by: NURSE PRACTITIONER

## 2022-04-25 PROCEDURE — 97167 OT EVAL HIGH COMPLEX 60 MIN: CPT

## 2022-04-25 PROCEDURE — 85018 HEMOGLOBIN: CPT | Performed by: NURSE PRACTITIONER

## 2022-04-25 PROCEDURE — 82948 REAGENT STRIP/BLOOD GLUCOSE: CPT

## 2022-04-25 PROCEDURE — 99233 SBSQ HOSP IP/OBS HIGH 50: CPT | Performed by: INTERNAL MEDICINE

## 2022-04-25 PROCEDURE — 84100 ASSAY OF PHOSPHORUS: CPT | Performed by: NURSE PRACTITIONER

## 2022-04-25 PROCEDURE — 99232 SBSQ HOSP IP/OBS MODERATE 35: CPT | Performed by: STUDENT IN AN ORGANIZED HEALTH CARE EDUCATION/TRAINING PROGRAM

## 2022-04-25 PROCEDURE — 97163 PT EVAL HIGH COMPLEX 45 MIN: CPT

## 2022-04-25 PROCEDURE — C9113 INJ PANTOPRAZOLE SODIUM, VIA: HCPCS | Performed by: NURSE PRACTITIONER

## 2022-04-25 PROCEDURE — 93010 ELECTROCARDIOGRAM REPORT: CPT | Performed by: INTERNAL MEDICINE

## 2022-04-25 PROCEDURE — 85025 COMPLETE CBC W/AUTO DIFF WBC: CPT | Performed by: NURSE PRACTITIONER

## 2022-04-25 PROCEDURE — 80053 COMPREHEN METABOLIC PANEL: CPT | Performed by: NURSE PRACTITIONER

## 2022-04-25 PROCEDURE — 80048 BASIC METABOLIC PNL TOTAL CA: CPT | Performed by: NURSE PRACTITIONER

## 2022-04-25 RX ORDER — FUROSEMIDE 10 MG/ML
20 INJECTION INTRAMUSCULAR; INTRAVENOUS ONCE
Status: COMPLETED | OUTPATIENT
Start: 2022-04-25 | End: 2022-04-25

## 2022-04-25 RX ORDER — MAGNESIUM SULFATE HEPTAHYDRATE 40 MG/ML
2 INJECTION, SOLUTION INTRAVENOUS ONCE
Status: COMPLETED | OUTPATIENT
Start: 2022-04-25 | End: 2022-04-25

## 2022-04-25 RX ORDER — PANTOPRAZOLE SODIUM 40 MG/1
40 TABLET, DELAYED RELEASE ORAL
Status: DISCONTINUED | OUTPATIENT
Start: 2022-04-25 | End: 2022-04-28 | Stop reason: HOSPADM

## 2022-04-25 RX ADMIN — PANTOPRAZOLE SODIUM 40 MG: 40 INJECTION, POWDER, FOR SOLUTION INTRAVENOUS at 09:38

## 2022-04-25 RX ADMIN — POTASSIUM & SODIUM PHOSPHATES POWDER PACK 280-160-250 MG 1 PACKET: 280-160-250 PACK at 09:32

## 2022-04-25 RX ADMIN — LIDOCAINE 1 PATCH: 50 PATCH TOPICAL at 09:34

## 2022-04-25 RX ADMIN — INSULIN LISPRO 1 UNITS: 100 INJECTION, SOLUTION INTRAVENOUS; SUBCUTANEOUS at 09:23

## 2022-04-25 RX ADMIN — FUROSEMIDE 20 MG: 10 INJECTION, SOLUTION INTRAMUSCULAR; INTRAVENOUS at 04:41

## 2022-04-25 RX ADMIN — PRAVASTATIN SODIUM 40 MG: 40 TABLET ORAL at 09:32

## 2022-04-25 RX ADMIN — INSULIN LISPRO 2 UNITS: 100 INJECTION, SOLUTION INTRAVENOUS; SUBCUTANEOUS at 22:15

## 2022-04-25 RX ADMIN — Medication 6 MG: at 22:15

## 2022-04-25 RX ADMIN — LEVOTHYROXINE SODIUM 100 MCG: 100 TABLET ORAL at 06:15

## 2022-04-25 RX ADMIN — IRON SUCROSE 300 MG: 20 INJECTION, SOLUTION INTRAVENOUS at 14:38

## 2022-04-25 RX ADMIN — MAGNESIUM SULFATE 2 G: 2 INJECTION INTRAVENOUS at 07:01

## 2022-04-25 RX ADMIN — ACETAMINOPHEN 650 MG: 325 TABLET ORAL at 12:41

## 2022-04-25 RX ADMIN — PANTOPRAZOLE SODIUM 40 MG: 40 TABLET, DELAYED RELEASE ORAL at 16:05

## 2022-04-25 RX ADMIN — FUROSEMIDE 20 MG: 10 INJECTION, SOLUTION INTRAMUSCULAR; INTRAVENOUS at 12:41

## 2022-04-25 NOTE — ASSESSMENT & PLAN NOTE
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

## 2022-04-25 NOTE — PHYSICAL THERAPY NOTE
Physical Therapy Evaluation   Time in: 0805  Time out: 0821  Total evaluation time: 16 minutes    Patient's Name: Lucrecia Barron    Admitting Diagnosis  Shock ([facility] ) [R57 9]  Anemia [D64 9]  Hypotension [I95 9]  Upper GI bleed [K92 2]    Problem List  Patient Active Problem List   Diagnosis    Primary hypertension    Other hyperlipidemia    Diabetes mellitus type 2, insulin dependent ([facility] )    Hypothyroidism    Basal cell carcinoma    Overweight (BMI 25 0-29  9)    Microalbuminuria    Hypomagnesemia    Acute respiratory failure with hypoxia (HCC)    Severe sepsis (HCC)    Right upper lobe pneumonia    Upper GI bleed    ABLA (acute blood loss anemia)    Acute cystitis without hematuria    Hypoxia    Elevated troponin level not due myocardial infarction    Lactic acidosis    Chronic respiratory failure (HCC)    SIRS (systemic inflammatory response syndrome) (HCC)    Abnormal CT of the chest    Compression fracture of body of thoracic vertebra (HCC)    Cirrhosis (HCC)    Elevated troponin       Past Medical History  Past Medical History:   Diagnosis Date    Anemia     Arthritis     Basal cell carcinoma of skin of face     Bleeding ulcer     Diabetes mellitus ([facility] )     Hypercholesterolemia     Hypertension     Hypothyroidism     Insulin dependent diabetes mellitus        Past Surgical History  Past Surgical History:   Procedure Laterality Date    CATARACT EXTRACTION Bilateral     MOHS SURGERY      REPLACEMENT TOTAL KNEE Right     SINUS SURGERY      TONSILLECTOMY      WISDOM TOOTH EXTRACTION         PT performed at least 2 patient identifiers during session: Name and wristband         04/25/22 0815   PT Last Visit   PT Visit Date 04/25/22   Note Type   Note type Evaluation   Pain Assessment   Pain Assessment Tool 0-10   Pain Score No Pain   Restrictions/Precautions   Weight Bearing Precautions Per Order No   Other Precautions Contact/isolation;Cognitive;Multiple lines;Telemetry;O2;Fall Risk  (3 L O2 via NC)   Home Living   Type of Home Assisted living  ([facility])   Home Layout Performs ADLs on one level; Able to live on main level with bedroom/bathroom; Access; Elevator   Bathroom Shower/Tub Walk-in shower   Bathroom Toilet Raised   Bathroom Equipment Grab bars in shower; Shower chair;Grab bars around toilet   [facility]  (pt reports amb occ with support using RW)   Additional Comments pt is questionable historian d/t confusion   Prior Function   Level of [place] Needs assistance with ADLs and functional mobility; Needs assistance with IADLs   Lives With Facility staff   Receives Help From Personal care attendant   ADL Assistance Needs assistance   IADLs Needs assistance   Falls in the last 6 months   (pt denies any falls)   Vocational Retired   General   Family/Caregiver Present No   Cognition   Overall Cognitive Status Impaired   Arousal/Participation Alert   Attention Attends with cues to redirect   Orientation Level Oriented to person;Disoriented to place; Disoriented to time;Disoriented to situation  ("I think April" reported for month, did not recall year)   Memory Decreased recall of precautions;Decreased recall of biographical information   Following Commands Follows one step commands with increased time or repetition   Comments pt agreeable to PT session   RLE Assessment   RLE Assessment X   Strength RLE   RLE Overall Strength 3+/5   LLE Assessment   LLE Assessment X   Strength LLE   LLE Overall Strength 3+/5   Vision-Basic Assessment   Current Vision Wears glasses all the time   Bed Mobility   Supine to Sit 4  Minimal assistance   Additional items Assist x 1;HOB elevated; Bedrails; Increased time required;Verbal cues;LE management   Additional Comments Pt reported (+) lightheadedness upon sitting at EOB  Provided pt increased seated rest time      Transfers   Sit to Stand 4  Minimal assistance   Additional items Assist x 1; Increased time required   Stand to Sit 4  Minimal assistance   Additional items Assist x 1; Increased time required   Toilet transfer 4  Minimal assistance   Additional items Assist x 1; Increased time required;Verbal cues; Commode   Additional Comments Verbal cues provided for sequencing and proper body mechanics  Ambulation/Elevation   Gait pattern Improper Weight shift;Decreased foot clearance;Shuffling   Gait Assistance 4  Minimal assist   Additional items Assist x 1   Assistive Device Rolling walker   Distance 3 ft x 2   Stair Management Assistance Not tested   Ambulation/Elevation Additional Comments BP post transfer into recliner = 109/55   Balance   Static Sitting Fair +   Dynamic Sitting Fair   Static Standing Fair -   Dynamic Standing Poor +   Ambulatory Poor +   Endurance Deficit   Endurance Deficit Yes   Activity Tolerance   Activity Tolerance Patient limited by fatigue   Medical Staff Made Aware Pt seen as a co-eval with PT due to the patient's co-morbidities, clinically unstable presentation, and present impairments which are a regression from the patient's baseline  Nurse Made Aware NABOR Cabello Caro verbalized pt appropriate for therapy and made aware of outcomes    Assessment   Prognosis Fair   Problem List Decreased strength;Decreased endurance; Impaired balance;Decreased mobility; Decreased safety awareness;Decreased cognition   Assessment Pt is 80 y o  female seen for high-complexity PT evaluation on 4/25/2022 s/p admit to [facility] on 4/23/2022 w/ Upper GI bleed  PT was consulted to assess pt's functional mobility and d/c needs  Order placed for PT eval and tx, w/ up w/ A order  PTA, pt resides at [facility]  At time of eval, pt requires min Ax1 for all mobility tasks, tolerated amb x 3 ft x 2 trials   Upon evaluation, pt presenting with impaired functional mobility d/t decreased strength, decreased endurance, impaired balance, decreased mobility, decreased cognition and decreased safety awareness  Pertinent PMHx and current co-morbidities affecting pt's physical performance at time of assessment include: acute blood loss anemia, cirrhosis, chronic respiratory failure, HLD, HTN and hypothyroidism  Personal factors affecting pt at time of eval include: decreased cognition  The following objective measures performed on IE also reveal limitations: AM-PAC 6-Clicks: 29/35  Pt's clinical presentation is currently unstable/unpredictable seen in pt's presentation of abnormal lab value(s)  Overall, pt's rehab potential and prognosis to return to PLOF is fair as impacted by objective findings, warranting pt to receive further skilled PT interventions to address identified impairments, activity limitation(s), and participation restriction(s)  Pt to benefit from continued PT tx to address deficits as defined above and maximize level of functional independent mobility and consistency in order for pt to improve activity tolerance  From PT/mobility standpoint, recommendation at time of d/c would be return to facility with rehabilitation services pending progress in order to facilitate return to PLOF  Barriers to Discharge Inaccessible home environment   Goals   Patient Goals "to get better"   [facility] Expiration Date 05/05/22   Short Term Goal #1 In 7-10 days: Increase bilateral LE strength 1/2 grade to facilitate independent mobility, Perform all bed mobility tasks modified independent to decrease caregiver burden, Perform all transfers modified independent to improve independence, Ambulate > 50 ft  with least restrictive assistive device modified independent w/o LOB and w/ normalized gait pattern 100% of the time, Increase all balance 1/2 grade to decrease risk for falls, Complete exercise program independently and Tolerate 4 hr OOB to faciliate upright tolerance   PT Treatment Day 0   Plan   Treatment/Interventions Functional transfer training;LE strengthening/ROM; Therapeutic exercise; Endurance training;Cognitive reorientation;Patient/family training;Equipment eval/education; Bed mobility;Gait training   PT Frequency 3-5x/wk   Recommendation   PT Discharge Recommendation Return to facility with rehabilitation services   Equipment Recommended   (TBD pending progress)   Additional Comments Pt's raw score on the [facility] Basic Mobility inpatient short form is 17, standardized score is 39 67  Patients at this level are likely to benefit from DC to [facility], however, please refer to therapist recommendation for safe DC planning  [facility] Basic Mobility Inpatient   Turning in Bed Without Bedrails 3   Lying on Back to Sitting on Edge of Flat Bed 3   Moving Bed to Chair 3   Standing Up From Chair 3   Walk in Room 3   Climb 3-5 Stairs 2   Basic Mobility Inpatient Raw Score 17   Basic Mobility Standardized Score 39 67   Highest Level Of Mobility   JH-HLM Goal 5: Stand one or more mins   JH-HLM Highest Level of Mobility 4: Move to chair/commode   JH-HLM Goal Achieved No   End of Consult   Patient Position at End of Consult Bedside chair; All needs within reach       Demar Peter, PT, DPT

## 2022-04-25 NOTE — PLAN OF CARE
Problem: OCCUPATIONAL THERAPY ADULT  Goal: Performs self-care activities at highest level of function for planned discharge setting  See evaluation for individualized goals  Description: Treatment Interventions: ADL retraining,Functional transfer training,UE strengthening/ROM,Endurance training,Cognitive reorientation,Patient/family training,Equipment evaluation/education,Compensatory technique education,Energy conservation,Activityengagement          See flowsheet documentation for full assessment, interventions and recommendations  Note: Limitation: Decreased ADL status,Decreased UE strength,Decreased Safe judgement during ADL,Decreased cognition,Decreased endurance,Decreased self-care trans,Decreased high-level ADLs  Prognosis: Good  Assessment: Patient is a 80 y o  female seen for OT evaluation s/p admit to [place] [address] on 4/23/2022 w/Upper GI bleed  Commorbidities affecting patient's functional performance at time of assessment include: acute blood loss anemia, cirrhosis, chronic respiratory failure, HLD, HTN and hypothyroidism  Orders placed for OT evaluation and treatment and up with assistance  Performed at least two patient identifiers during session including name and wristband  Prior to admission, Patient requires assistance with ADLs/IADLs, ambulatory with RW and lives at [facility]  Personal factors affecting patient at time of initial evaluation include: limited insight into deficits, decreased initiation and engagement, difficulty performing ADLs and difficulty performing IADLs  Upon evaluation, patient requires minimal  assist for UB ADLs, moderate assist for LB ADLs, transfers and functional ambulation in room and bathroom with minimal  assist, with the use of Rolling Walker  Patient is oriented to place,, disoriented to time,, disoriented to place, and disoriented to situation, and presents with impaired judgement, inability to make safe decisions    Occupational performance is affected by the following deficits: orientation, decreased muscle strength, dynamic sit/ stand balance deficit with poor standing tolerance time for self care and functional mobility, decreased activity tolerance, impaired memory, impaired problem solving, decreased safety awareness and delayed righting and equilibrium reactions  Based on the mentioned OT evaluation outcomes, functional performance deficits, and assessment findings, pt has been identified as a high complexity evaluation  Patient to benefit from continued Occupational Therapy treatment while in the hospital to address deficits as defined above and maximize level of functional independence with ADLs and functional mobility  Occupational Performance areas to address include: eating, grooming , bathing/ shower, dressing, toilet hygiene, transfer to all surfaces and functional ambulation  From OT standpoint, recommendation at time of d/c would be Return to facility with rehabilitation services         OT Discharge Recommendation: Return to facility with rehabilitation services  OT - OK to Discharge: Yes (Once medically cleared )     Paul Tyson, OT

## 2022-04-25 NOTE — PLAN OF CARE
Problem: PAIN - ADULT  Goal: Verbalizes/displays adequate comfort level or baseline comfort level  Description: Interventions:  - Encourage patient to monitor pain and request assistance  - Assess pain using appropriate pain scale  - Administer analgesics based on type and severity of pain and evaluate response  - Implement non-pharmacological measures as appropriate and evaluate response  - Consider cultural and social influences on pain and pain management  - Notify physician/advanced practitioner if interventions unsuccessful or patient reports new pain  Outcome: Progressing     Problem: INFECTION - ADULT  Goal: Absence or prevention of progression during hospitalization  Description: INTERVENTIONS:  - Assess and monitor for signs and symptoms of infection  - Monitor lab/diagnostic results  - Monitor all insertion sites, i e  indwelling lines, tubes, and drains  - Monitor endotracheal if appropriate and nasal secretions for changes in amount and color  - [place] appropriate cooling/warming therapies per order  - Administer medications as ordered  - Instruct and encourage patient and family to use good hand hygiene technique  - Identify and instruct in appropriate isolation precautions for identified infection/condition  Outcome: Progressing  Goal: Absence of fever/infection during neutropenic period  Description: INTERVENTIONS:  - Monitor WBC    Outcome: Progressing     Problem: SAFETY ADULT  Goal: Patient will remain free of falls  Description: INTERVENTIONS:  - Educate patient/family on patient safety including physical limitations  - Instruct patient to call for assistance with activity   - Consult OT/PT to assist with strengthening/mobility   - Keep Call bell within reach  - Keep bed low and locked with side rails adjusted as appropriate  - Keep care items and personal belongings within reach  - Initiate and maintain comfort rounds  - Make Fall Risk Sign visible to staff  - Offer Toileting every 2 Hours, in advance of need  - Initiate/Maintain bed alarm  - Apply yellow socks and bracelet for high fall risk patients  - Consider moving patient to room near nurses station  Outcome: Progressing  Goal: Maintain or return to baseline ADL function  Description: INTERVENTIONS:  -  Assess patient's ability to carry out ADLs; assess patient's baseline for ADL function and identify physical deficits which impact ability to perform ADLs (bathing, care of mouth/teeth, toileting, grooming, dressing, etc )  - Assess/evaluate cause of self-care deficits   - Assess range of motion  - Assess patient's mobility; develop plan if impaired  - Assess patient's need for assistive devices and provide as appropriate  - Encourage maximum independence but intervene and supervise when necessary  - Involve family in performance of ADLs  - Assess for home care needs following discharge   - Consider OT consult to assist with ADL evaluation and planning for discharge  - Provide patient education as appropriate  Outcome: Progressing  Goal: Maintains/Returns to pre admission functional level  Description: INTERVENTIONS:  - Perform BMAT or MOVE assessment daily    - Set and communicate daily mobility goal to care team and patient/family/caregiver  - Collaborate with rehabilitation services on mobility goals if consulted  - Perform Range of Motion 3 times a day  - Reposition patient every 2 hours    - Dangle patient 2 times a day  - Stand patient 2 times a day  - Ambulate patient 2 times a day  - Out of bed to chair 2 times a day   - Out of bed for meals 2 times a day  - Out of bed for toileting  - Record patient progress and toleration of activity level   Outcome: Progressing     Problem: DISCHARGE PLANNING  Goal: Discharge to home or other facility with appropriate resources  Description: INTERVENTIONS:  - Identify barriers to discharge w/patient and caregiver  - Arrange for needed discharge resources and transportation as appropriate  - Identify discharge learning needs (meds, wound care, etc )  - Arrange for interpretive services to assist at discharge as needed  - Refer to Case Management Department for coordinating discharge planning if the patient needs post-hospital services based on physician/advanced practitioner order or complex needs related to functional status, cognitive ability, or social support system  Outcome: Progressing     Problem: Knowledge Deficit  Goal: Patient/family/caregiver demonstrates understanding of disease process, treatment plan, medications, and discharge instructions  Description: Complete learning assessment and assess knowledge base  Interventions:  - Provide teaching at level of understanding  - Provide teaching via preferred learning methods  Outcome: Progressing     Problem: MOBILITY - ADULT  Goal: Maintain or return to baseline ADL function  Description: INTERVENTIONS:  -  Assess patient's ability to carry out ADLs; assess patient's baseline for ADL function and identify physical deficits which impact ability to perform ADLs (bathing, care of mouth/teeth, toileting, grooming, dressing, etc )  - Assess/evaluate cause of self-care deficits   - Assess range of motion  - Assess patient's mobility; develop plan if impaired  - Assess patient's need for assistive devices and provide as appropriate  - Encourage maximum independence but intervene and supervise when necessary  - Involve family in performance of ADLs  - Assess for home care needs following discharge   - Consider OT consult to assist with ADL evaluation and planning for discharge  - Provide patient education as appropriate  Outcome: Progressing  Goal: Maintains/Returns to pre admission functional level  Description: INTERVENTIONS:  - Perform BMAT or MOVE assessment daily    - Set and communicate daily mobility goal to care team and patient/family/caregiver     - Collaborate with rehabilitation services on mobility goals if consulted  - Perform Range of Motion 3 times a day  - Reposition patient every 3 hours    - Dangle patient 2 times a day  - Stand patient 2 times a day  - Ambulate patient 2 times a day  - Out of bed to chair 2 times a day   - Out of bed for meals 2 times a day  - Out of bed for toileting  - Record patient progress and toleration of activity level   Outcome: Progressing     Problem: Potential for Falls  Goal: Patient will remain free of falls  Description: INTERVENTIONS:  - Educate patient/family on patient safety including physical limitations  - Instruct patient to call for assistance with activity   - Consult OT/PT to assist with strengthening/mobility   - Keep Call bell within reach  - Keep bed low and locked with side rails adjusted as appropriate  - Keep care items and personal belongings within reach  - Initiate and maintain comfort rounds  - Make Fall Risk Sign visible to staff  - Offer Toileting every 2 Hours, in advance of need  - Apply yellow socks and bracelet for high fall risk patients  - Consider moving patient to room near nurses station  Outcome: Progressing     Problem: Prexisting or High Potential for Compromised Skin Integrity  Goal: Skin integrity is maintained or improved  Description: INTERVENTIONS:  - Identify patients at risk for skin breakdown  - Assess and monitor skin integrity  - Assess and monitor nutrition and hydration status  - Monitor labs   - Assess for incontinence   - Turn and reposition patient  - Assist with mobility/ambulation  - Relieve pressure over bony prominences  - Avoid friction and shearing  - Provide appropriate hygiene as needed including keeping skin clean and dry  - Evaluate need for skin moisturizer/barrier cream  - Collaborate with interdisciplinary team   - Patient/family teaching  - Consider wound care consult   Outcome: Progressing

## 2022-04-25 NOTE — ASSESSMENT & PLAN NOTE
· Troponin elevated on arrival at 320--> peaked at 763, continues to downtrend  · Noted to have been elevated on recent hospitalization as well and was determined to be non-MI elevation due to sepsis  · Likely secondary to demand ischemia from ABLA and hemorrhagic shock  · EKG unremarkable

## 2022-04-25 NOTE — NURSING NOTE
Awake with confusion to date and time but knows the year and month  Aware she is in the hospital   Moves well with no focal deficit with exception of right facial droop due to surgery  Denies pain  OOB to chair with assist of 2 minimal   Ambulates fair with walker and able to bear weight well  Some difficulty with transfers due to short stature  Heart tones are muffled with murmur  Pulses palpable with trace generalized edema  Color is pale  Skin is warm and dry   Lungs are decreased with scattered crackles and rales  Occasional dry cough  Respirations are labored at rest with increased dyspnea on exertion  Also increased work of breathing with anxiety  Tolerating O2 via NC  SpO2 is >90% with transfer  Abdomen is large and soft with hypoactive bowel sounds  Continent of a small amount of loose to liquid black melanic stool  IV sites intact but flush sluggishly  Will monitor  Dressing to left anterior lower leg is intact

## 2022-04-25 NOTE — PROGRESS NOTES
[id number]: Received report from day shift RN  2000: Pt bathed, linens changed & repositioned  Pt c/o arthritis pain across her shoulders & was given 650 mg po tylenol PRN  : Pt sleeping comfortably, turning/repositioning self independently while asleep  0445: Pt cleaned of incontinence of small amount of creamy brown/black stool  Pads changed & repositioned in bed  Pt given 20 mg iv lasix  Purewick applied in case of incontinence while asleep  Morning blood work obtained via peripheral stick from left arm  Hgb came back at 8 6, down from 10 2  Repeat Hgb order received for noon  No signs of bleeding noted  0630: Notified [place] of Mg 1 8; received order for 2 gm replacement

## 2022-04-25 NOTE — ASSESSMENT & PLAN NOTE
· SIRS criteria met on arrival with tachycardia, tachypnea and leukocytosis  · Patient discharged 2 days prior on a 5 day course of Augmentin for RUL pneumonia and cystitis  · No indication of current infection procalcitonin now negative, UA and CXR are without indication of infection and patient is afebrile  · Monitor off antibiotics  · Procalcitonin negative  · Trend WBC and temperature curve

## 2022-04-25 NOTE — ASSESSMENT & PLAN NOTE
· Discharged on 3 L from hospitalized in December for pneumonia and per daughter patient has declined follow up with pulmonology to assess her for removal  · Unclear etiology, ILD noted on imaging  · Requires 2-3L NC baseline, currently on 6L NC  · Given 20 IV lasix overnight  · Monitor SPO2  · Consider pulmonary consult vs outpatient pulmonology consult if patient and family is interested in pursing workup

## 2022-04-25 NOTE — PROGRESS NOTES
Progress Note- Xochitl Mckeon 80 y o  female MRN: [id number]    Unit/Bed#: ICU 11-01 Encounter: [id number]      Assessment and Plan:    Patient is a 80-year-old female with PMH significant for dementia and history of gastric AVMs who presented on 04/23 for symptomatic anemia  This is patient's 2nd admission for severe symptomatic anemia within the past week, with previous recommendations including optimizing patient from a cardiopulmonary standpoint given sepsis 2/2 PNA and increased respiratory requirements, as well as, defering endoscopic evaluation (EGD/colon) to outpatient setting given hemodynamic stability and no signs of overt bleeding  Labs on this admission notable for hemoglobin of 7 2 down from 8 5 two days prior  Has since received 2 units PRBCs total  Given readmission and continually down trending hgb requirng transfusion, recommended EGD for further evaluation  EGD on 04/24/2022 notable for normal appearing esophagus; 2 large gastric AVM this treated with APC; edematous mucosa with erosion the pylorus; normal   Duodenum  Today's labs notable for down trending hemoglobin (8 6), chronic leukocytosis, stable hepatic and renal function  Patient with intermittently soft blood pressures (4/25)  Per nursing patient with small volume brown/black stool with red streaking and clots overnight - May be due to old blood vs less likely lower or right-sided colonic bleed  Recommend to continue twice daily PPI x2 weeks, monitoring hemoglobin with transfusions as necessary, monitoring stools for further signs of overt GI bleeding, and monitoring vitals closely  If patient with continued bleeding, down trending hgb requiring transfusion, or hemodynamic instability consider repeat endoscopic evaluation with colonoscopy  Also recommend outpatient GI follow-up to further discuss incidentally noted cirrhosis w/o obvious complications     - Continue twice daily PPI x2 weeks; Transition to one daily thereafter  - Monitor hgb; Transfuse for hgb <7 0  - Monitor stools for further signs of overt GI bleeding  - Monitor vitals closely    GI will continue to follow  ______________________________________________________________________    Subjective:     Patient found lying comfortably in her bedside chair  No acute events overnight  Patient states she is doing well and offers no complaints  Per nursing, patient with small volume brown/black stool with red streaking and clots  Denies all other GI related complaints      Medication Administration - last 24 hours from 04/24/2022 0828 to 04/25/2022 9166       Date/Time Order Dose Route Action Action by     04/25/2022 0615 levothyroxine tablet 100 mcg 100 mcg Oral Given Fariha Ordaz RN     04/24/2022 1022 pravastatin (PRAVACHOL) tablet 40 mg 0 mg Oral Hold Minnie Ruvalcaba RN     04/24/2022 1207 insulin lispro (HumaLOG) 100 units/mL subcutaneous injection 1-6 Units 2 Units Subcutaneous Given Minnie Ruvalcaba RN     04/24/2022 1215 pantoprazole (PROTONIX) 80 mg in sodium chloride 0 9 % 100 mL infusion 0 mg/hr Intravenous Stopped Minnie Ruvalcaba RN     04/24/2022 2120 lidocaine (LIDODERM) 5 % patch 1 patch 1 patch Topical Patch Removed Fariha Ordaz RN     04/24/2022 0920 lidocaine (LIDODERM) 5 % patch 1 patch 1 patch Topical Medication Applied Minnie Ruvalcaba RN     04/24/2022 2128 melatonin tablet 6 mg 6 mg Oral Given Fariha Ordaz RN     04/24/2022 2128 pantoprazole (PROTONIX) injection 40 mg 40 mg Intravenous Given Fariha Ordaz RN     04/24/2022 1206 pantoprazole (PROTONIX) injection 40 mg 40 mg Intravenous Given Minnie Ruvalcaba RN     04/24/2022 1715 insulin lispro (HumaLOG) 100 units/mL subcutaneous injection 2-12 Units 2 Units Subcutaneous Given Minnie Ruvalcaba RN     04/24/2022 1813 insulin lispro (HumaLOG) 100 units/mL subcutaneous injection 1-6 Units 1 Units Subcutaneous Not Given Minnie Ruvalcaba, RN     04/24/2022 2117 insulin lispro (HumaLOG) 100 units/mL subcutaneous injection 1-5 Units 1 Units Subcutaneous Not Given Joss Darnell, NABOR     04/24/2022 2028 acetaminophen (TYLENOL) tablet 650 mg 650 mg Oral Given Joss Darnell, NABOR     04/25/2022 0441 furosemide (LASIX) injection 20 mg 20 mg Intravenous Given Joss Darnell, NABOR     04/25/2022 0701 magnesium sulfate 2 g/50 mL IVPB (premix) 2 g 2 g Intravenous New Bag Joss Darnell RN          Objective:     Vitals: Blood pressure 94/50, pulse 92, temperature 97 5 °F (36 4 °C), temperature source Temporal, resp  rate 22, height 5' 4" (1 626 m), weight 79 4 kg (175 lb 0 7 oz), SpO2 96 %  ,Body mass index is 30 05 kg/m²  Intake/Output Summary (Last 24 hours) at 4/25/2022 8335  Last data filed at 4/25/2022 7529  Gross per 24 hour   Intake 1310 ml   Output 975 ml   Net 335 ml       Physical Exam:   General Appearance: Awake and alert, in no acute distress  Abdomen: Soft, non-tender, non-distended; bowel sounds normal; no masses or no organomegaly    Invasive Devices  Report    Peripheral Intravenous Line            Peripheral IV 04/23/22 Left;Proximal;Ventral (anterior) Forearm 1 day    Peripheral IV 04/24/22 Right;Ventral (anterior) Forearm 1 day                Lab Results:  No results displayed because visit has over 200 results  Imaging Studies: I have personally reviewed pertinent imaging studies

## 2022-04-25 NOTE — CASE MANAGEMENT
Case Management Assessment & Discharge Planning Note    Patient name Sukhi Patrick  Location ICU 11/ICU  MRN [id number]  : 1929 Date 2022       Current Admission Date: 2022  Current Admission Diagnosis:Upper GI bleed   Patient Active Problem List    Diagnosis Date Noted    Elevated troponin 2022    Chronic respiratory failure ([facility] [address] ) 2022    [facility] syndrome) [facility]    Abnormal CT of the chest 2022    Compression fracture of body of thoracic vertebra ([facility] ) 2022    Cirrhosis ([facility] ) 2022    Severe sepsis ([facility] ) 2022    Right upper lobe pneumonia 2022    Upper GI bleed 2022    ABLA (acute blood loss anemia) 2022    Acute cystitis without hematuria 2022    Hypoxia 2022    Elevated troponin level not due myocardial infarction 2022    Lactic acidosis 2022    Acute respiratory failure with hypoxia ([facility] ) 2021    Hypomagnesemia 2021    Microalbuminuria 2020    Overweight (BMI 25 0-29 9) 2019    Other hyperlipidemia 2018    Diabetes mellitus type 2, insulin dependent ([facility] )     Hypothyroidism     Basal cell carcinoma     Primary hypertension 2018      LOS (days): 2  Geometric Mean LOS (GMLOS) (days): 4 40  Days to GMLOS:2 4     OBJECTIVE:  PATIENT READMITTED [facility] 35  of Unplanned Readmission Score: 18     Current admission status: Inpatient    Preferred Pharmacy:   [address] [place] [address], [address] [address]  Phone: [phone] Fax: [phone] [id number] [place] [address] [address] Valade[address], [address]  [address] [place] [id number]  Phone: [phone] Fax: [phone]    Primary Care Provider: Vashti Ruiz MD    Primary Insurance: MEDICARE  Secondary Insurance: [address],[facility] E SHIELD    ASSESSMENT:  Sheree Laboy Proxies    There are no active Health Care Proxies on file  Advance Directives  Does patient have a Health Care POA?: Yes  Does patient have Advance Directives?: Yes  Advance Directives: Living will,Power of  for health care  Primary Contact: Ignacio Fung and Moshe hoover    Readmission Root Cause  30 Day Readmission: Yes  Who directed you to return to the hospital?: Other (comment) (TRISTAN)  Did you understand whom to contact if you had questions or problems?: Yes  Did you get your prescriptions before you left the hospital?: No  Reason[de-identified] Preference for own pharmacy  Were you able to get your prescriptions filled when you left the hospital?: Yes  Did you take your medications as prescribed?: Yes  Were you able to get to your follow-up appointments?: No  Reason[de-identified] Readmitted prior to appointment  During previous admission, was a post-acute recommendation made?: No  Patient was readmitted due to: Septic Shock    Patient Information  Admitted from[de-identified] Facility  Mental Status: Alert  During Assessment patient was accompanied by: Not accompanied during assessment  Assessment information provided by[de-identified] Jeaneth Dumont (Both dtr at bedside)  Support Systems: Other (Comment) (TRISTAN)  [place] of Residence: [address] do you live in?: [address] entry access options   Select all that apply : Ramp  Type of Current Residence: Facility  Upon entering residence, is there a bedroom on the main floor (no further steps)?: Yes  Upon entering residence, is there a bathroom on the main floor (no further steps)?: Yes  In the last 12 months, was there a time when you were not able to pay the mortgage or rent on time?: No  In the last 12 months, how many places have you lived?: 1  In the last 12 months, was there a time when you did not have a steady place to sleep or slept in a shelter (including now)?: No  Homeless/housing insecurity resource given?: N/A  Living Arrangements: Other (Comment)  Is patient a ?: No    Activities of Daily Living Prior to Admission  Functional Status: Assistance  Completes ADLs independently?: No  Level of ADL dependence: Assistance  Ambulates independently?: Yes  Does patient use assisted devices?: Yes  Assisted Devices (DME) used: Home Oxygen concentrator,Portable Oxygen tanks,Walker  DME Company Name (respiratory supplies):  [employer]  O2 Rate(s): 3L  Does patient currently own DME?: Yes  What DME does the patient currently own?: Home Oxygen concentrator,Portable Oxygen tanks,Walker  Does patient have a history of Outpatient Therapy (PT/OT)?: No  Does the patient have a history of Short-Term Rehab?: No  Does patient have a history of HHC?: Yes (Catherine Zeng PT)  Does patient currently have [address]?: Yes Ehsan Bardales)    Current Home Health Care  Type of Current Home Care Services: Home PT  Current Home Health Follow-Up Provider[de-identified] PCP    Patient Information Continued  Income Source: Pension/FCI  Does patient have prescription coverage?: Yes  Within the past 12 months, you worried that your food would run out before you got the money to buy more : Never true  Within the past 12 months, the food you bought just didnt last and you didnt have money to get more : Never true  Food insecurity resource given?: N/A  Does patient receive dialysis treatments?: No  Does patient have a history of substance abuse?: No  Does patient have a history of Mental Health Diagnosis?: No    PHQ 2/9 Screening   Reviewed PHQ 2/9 Depression Screening Score?: No    Means of Transportation  [facility]ans [facility][de-identified] Family transport  In the past 12 months, has lack of transportation kept you from medical appointments or from getting medications?: No  In the past 12 months, has lack of transportation kept you from meetings, work, or from getting things needed for daily living?: No  Was application for public transport provided?: N/A    DISCHARGE DETAILS:  Received a hospice referral   Provided the list to the sneha who are at the bedside  Family [facility] & [facility]  A referral was amde for same  TC to [place], spoke to Nayeli Kapadia and notified of same

## 2022-04-25 NOTE — PROGRESS NOTES
Veronica 45  Progress Note - Airam Morales 1/19/1929, 80 y o  female MRN: [id number]  Unit/Bed#: ICU 11-01 Encounter: [id number]  Primary Care Provider: Laura Frances MD   Date and time admitted to hospital: 4/23/2022 12:58 PM    * Upper GI bleed  Assessment & Plan  · Patient has a history of an upper GI "bleeding ulcer" approximately 10 years ago  · Developed an UGIB during hospitalization from 4/18/22-4/21/22   She was deemed too unstable for inpatient EGD and was recommended for outpatient EGD follow up which patient declined  · Discharged on Protonix daily  ·   ·   · On arrival patient appears pale and hemoglobin initially 7 2  · BUN elevated at 40  · Likely this is a slow ooze that has continued since last hospitalization as patient did not have EGD to evaluate to treat bleed  · Received 2 units pRBC  · EGD 4/24 2 large gastric AVMs treated with APC  · GI recommending protonix BID x2 weeks then daily  · Hemoglobin remains stable  · GI following, appreciate input    ABLA (acute blood loss anemia)  Assessment & Plan  · Patient presented with an initial hemoglobin of 7 2 and a baseline hemoglobin between 12-14  · Hemoglobin 8 5 on 4/21 prior to discharge, 1 3 gram drop  · Patient had declined outpatient EGD for UGIB during recent hospitalization and this is likely due to a slow ooze from history of bleeding ulcer  · Transfused 2 units pRBC  · See details of EGD above  · Transfuse for hemoglobin <7, symptomatic anemia or active bleeding    Elevated troponin  Assessment & Plan  · Troponin elevated on arrival at 320--> peaked at 763, continues to downtrend  · Noted to have been elevated on recent hospitalization as well and was determined to be non-MI elevation due to sepsis  · Likely secondary to demand ischemia from ABLA and hemorrhagic shock  · EKG unremarkable     SIRS (systemic inflammatory response syndrome) ([facility] [address] )  Assessment & Plan  · SIRS criteria met on arrival with tachycardia, tachypnea and leukocytosis  · Patient discharged 2 days prior on a 5 day course of Augmentin for RUL pneumonia and cystitis  · No indication of current infection procalcitonin now negative, UA and CXR are without indication of infection and patient is afebrile  · Monitor off antibiotics  · Procalcitonin negative  · Trend WBC and temperature curve    Compression fracture of body of thoracic vertebra (HCC)  Assessment & Plan  · Superior endplate compression fracture of the T12 vertebral body  Unclear if chronic  · Lidoderm patch to back Q12 hr  · Tylenol PRN  · PT/OT    Abnormal CT of the chest  Assessment & Plan  · CT chest revealed B/L interstitial lung disease likely usual interstitial pneumonia, superimposed diffuse ground glass opacities could represent cysts, pulmonary edema, or active inflammatory phase of underlying interstitial lung disease   Increased B/L pleural effusions  · Also noted intrathoracic stomach  · Per daughter these lung changes had been previously noted and an etiology has not been determined  · Consider pulmonary consult vs outpatient follow up    Chronic respiratory failure ([address] )  Assessment & Plan  · Discharged on 3 L from hospitalized in December for pneumonia and per daughter patient has declined follow up with pulmonology to assess her for removal  · Unclear etiology, ILD noted on imaging  · Requires 2-3L NC baseline, currently on 6L NC  · Given 20 IV lasix overnight  · Monitor SPO2  · Consider pulmonary consult vs outpatient pulmonology consult if patient and family is interested in pursing workup    Diabetes mellitus type 2, insulin dependent [facility])  Assessment & Plan  Lab Results   Component Value Date    HGBA1C 5 7 (H) 04/24/2022       Recent Labs     04/24/22  0549 04/24/22  1202 04/24/22  1713 04/24/22 2059   POCGLU 170* 206* 160* 143*       Blood Sugar Average: Last 72 hrs:  (P) 205 [id number]   · Hold home 70/30 and Glucovance  · Continue accuchecks Q6 with SSI while NPO  · Hypoglycemia protocol  · Hgb A1c pending    Primary hypertension  Assessment & Plan  · Hold home amlodipine and ibesartan  · Monitor BP    Other hyperlipidemia  Assessment & Plan  · Continue statin    Hypothyroidism  Assessment & Plan  · Continue synthroid    Cirrhosis ([address] )  Assessment & Plan  · Noted on CT  · Previously noted on prior CT from 22, no previous abdominal imaging to compare to  · AST, ALT, Alk phos and Tbili WNL  · Abdominal exam benign  · Outpatient follow up      ----------------------------------------------------------------------------------------  HPI/24hr events: No acute events overnight  Patient appropriate for transfer out of the ICU today?: Patient does not meet criteria for referral to the ICU Follow-Up Clinic; referral has not been made  Disposition: Transfer to Med-Surg   Code Status: Level 3 - DNAR and DNI  ---------------------------------------------------------------------------------------  SUBJECTIVE  Complains of some arthritic pain    Review of Systems   Constitutional: Positive for fatigue  HENT: Negative  Respiratory: Negative for cough and shortness of breath  Cardiovascular: Negative for chest pain  Gastrointestinal: Negative for abdominal pain, diarrhea, nausea and vomiting  Genitourinary: Negative  Musculoskeletal: Positive for arthralgias  Neurological: Negative  Psychiatric/Behavioral: Negative  All other systems reviewed and are negative      Review of systems was reviewed and negative unless stated above in HPI/24-hour events   ---------------------------------------------------------------------------------------  OBJECTIVE    Vitals   Vitals:    22 0200 22 0300 22 0400 22 0500   BP: 110/55 104/53 (!) 94/46 118/56   Pulse: 83 87 84 87   Resp: 19 (!) 53 21 20   Temp:   98 4 °F (36 9 °C)    TempSrc:   Temporal    SpO2: 94% 97% 98% 97%   Weight:       Height:         Temp (24hrs), Av 1 °F (36 7 °C), Min:97 5 °F (36 4 °C), Max:99 2 °F (37 3 °C)  Current: Temperature: 98 4 °F (36 9 °C)          Respiratory:  SpO2: SpO2: 97 %, SpO2 Activity: SpO2 Activity: At Rest, SpO2 Device: O2 Device: Nasal cannula  Nasal Cannula O2 Flow Rate (L/min): 3 L/min    Invasive/non-invasive ventilation settings   Respiratory  Report   Lab Data (Last 4 hours)    None         O2/Vent Data (Last 4 hours)    None                Physical Exam  Vitals and nursing note reviewed  HENT:      Head: Normocephalic  Mouth/Throat:      Mouth: Mucous membranes are dry  Pharynx: Oropharynx is clear  Eyes:      Pupils: Pupils are equal, round, and reactive to light  Cardiovascular:      Rate and Rhythm: Normal rate and regular rhythm  Pulses: Normal pulses  Heart sounds: Normal heart sounds  Pulmonary:      Effort: Pulmonary effort is normal       Breath sounds: Rales present  Abdominal:      General: Bowel sounds are normal       Palpations: Abdomen is soft  Musculoskeletal:         General: Normal range of motion  Cervical back: Normal range of motion  Skin:     General: Skin is warm and dry  Neurological:      Mental Status: She is alert  Mental status is at baseline               Laboratory and Diagnostics:  Results from last 7 days   Lab Units 04/25/22  0448 04/24/22  1825 04/24/22  1159 04/24/22  0458 04/24/22  0040 04/23/22  1325 04/21/22  0509 04/20/22  0545 04/20/22  0545 04/19/22  0807 04/19/22  0413 04/18/22  2301 04/18/22  2301   WBC Thousand/uL 11 03*  --   --  10 72*  --  13 68* 8 92  --  11 92*  --  10 07  --  11 01*   HEMOGLOBIN g/dL 8 6* 10 2* 9 1* 8 8* 9 0* 7 2* 8 5*   < > 8 4*   < > 8 6*   < > 7 6*   HEMATOCRIT % 28 6* 33 6* 28 3* 28 2* 29 6* 22 6* 28 4*   < > 28 0*   < > 27 7*   < > 24 6*   PLATELETS Thousands/uL 197  --   --  216  --  277 247  --  234  --  240  --  286   NEUTROS PCT % 80*  --   --  77*  --  82* 68  --  80*  --   --   --  75   BANDS PCT %  --   --   --   --   --   --   -- --   --   --  2  --   --    MONOS PCT % 8  --   --  8  --  8 9  --  8  --   --   --  7   MONO PCT %  --   --   --   --   --   --   --   --   --   --  1*  --   --     < > = values in this interval not displayed  Results from last 7 days   Lab Units 04/24/22  0458 04/23/22  1313 04/21/22  0509 04/20/22  0545 04/19/22  1544 04/19/22  0413 04/18/22  2301   SODIUM mmol/L 139 132* 136 135 132* 132* 131*   POTASSIUM mmol/L 4 4 5 3 4 4 4 6 4 2 5 4* 5 1   CHLORIDE mmol/L 107 100 103 100 97 97 97   CO2 mmol/L 26 21 28 28 22 20* 23   ANION GAP mmol/L 6 11 5 7 13 15* 11   BUN mg/dL 30* 40* 33* 43* 46* 47* 42*   CREATININE mg/dL 1 11 1 19 0 92 1 11 1 35* 1 34* 1 29   CALCIUM mg/dL 8 5 9 0 9 1 8 9 9 3 9 3 9 4   GLUCOSE RANDOM mg/dL 146* 289* 135 207* 294* 439* 295*   ALT U/L 4* 12  --   --   --   --  9   AST U/L 11* 22  --   --   --   --  15   ALK PHOS U/L 33* 36  --   --   --   --  50   ALBUMIN g/dL 3 2* 3 6  --   --   --   --  3 8   TOTAL BILIRUBIN mg/dL 0 75 0 55  --   --   --   --  0 58     Results from last 7 days   Lab Units 04/24/22  0458 04/21/22  0509 04/20/22  0545 04/19/22  1544 04/18/22  2301   MAGNESIUM mg/dL 1 9 2 0 2 0 1 7* 1 6*   PHOSPHORUS mg/dL 3 1 3 3 3 1 3 0  --       Results from last 7 days   Lab Units 04/23/22  1325 04/18/22  2310   INR  1 16 1 09   PTT seconds 36 30          Results from last 7 days   Lab Units 04/23/22  1909 04/19/22  0131 04/18/22  2301   LACTIC ACID mmol/L 1 9 4 1* 4 5*     ABG:    VBG:  Results from last 7 days   Lab Units 04/23/22  1715   PH CONOR  7 396   PCO2 CONOR mm Hg 33 9*   PO2 CONOR mm Hg 46 1*   HCO3 CONOR mmol/L 20 3*   BASE EXC CONOR mmol/L -3 9     Results from last 7 days   Lab Units 04/24/22  0458 04/23/22  1325 04/20/22  0545 04/18/22  2310   PROCALCITONIN ng/ml 0 12 0 14 0 09 0 10       Micro  Results from last 7 days   Lab Units 04/23/22  1743 04/23/22  1713 04/19/22  2205 04/19/22  0217 04/18/22  2310 04/18/22  2301   BLOOD CULTURE  No Growth at 24 hrs   No Growth at 24 hrs   --   --  No Growth After 5 Days  No Growth After 5 Days  MRSA CULTURE ONLY   --   --  No Methicillin Resistant Staphlyococcus aureus (MRSA) isolated  --   --   --    LEGIONELLA URINARY ANTIGEN   --   --   --  Negative  --   --    STREP PNEUMONIAE ANTIGEN, URINE   --   --   --  Negative  --   --        EKG: normal sinus rhythm   Imaging: I have personally reviewed pertinent reports  and I have personally reviewed pertinent films in PACS    Intake and Output  I/O       04/23 0701 04/24 0700 04/24 0701 04/25 0700    P  O  0 960    I V  (mL/kg)  300 (3 8)    Blood 362 5     IV Piggyback 231 7 50    Total Intake(mL/kg) 594 2 (7 6) 1310 (16 8)    Urine (mL/kg/hr) 550 575 (0 3)    Stool 0 0    Total Output 550 575    Net +44 2 +735          Unmeasured Urine Occurrence 1 x     Unmeasured Stool Occurrence 1 x 1 x          Height and Weights   Height: 5' 4" (162 6 cm)     Body mass index is 29 55 kg/m²  Weight (last 2 days)     Date/Time Weight    04/24/22 0538 78 1 (172 18)    04/23/22 1614 78 1 (172 18)    04/23/22 1527 78 1 (172 18)    04/23/22 1300 78 5 (173)            Nutrition       Diet Orders   (From admission, onward)             Start     Ordered    04/24/22 1627  Diet Dany/CHO Controlled; Diabetic CL Liquids  Diet effective now        References:    Nutrtion Support Algorithm Enteral vs  Parenteral   Question Answer Comment   Diet Type Dany/CHO Controlled    Dany/CHO Controlled Diabetic CL Liquids    RD to adjust diet per protocol?  No        04/24/22 1626                  Active Medications  Scheduled Meds:  Current Facility-Administered Medications   Medication Dose Route Frequency Provider Last Rate    acetaminophen  650 mg Oral Q6H PRN KUSH Braxton      insulin lispro  1-5 Units Subcutaneous HS KUSH Jones      insulin lispro  1-6 Units Subcutaneous TID AC Elena KUSH Qureshi      levothyroxine  100 mcg Oral Daily ElenaKUSH Walsh      lidocaine  1 patch Topical Daily KUSH Garrett      melatonin  6 mg Oral HS Belkys Wilburn Claire, KUSH      ondansetron  4 mg Intravenous Q8H PRN Ellen Camacho PA-C      pantoprazole  40 mg Intravenous Q12H [address] KUSH Kelsey      pravastatin  40 mg Oral Daily KUSH Garrett       Continuous Infusions:     PRN Meds:   acetaminophen, 650 mg, Q6H PRN  ondansetron, 4 mg, Q8H PRN        Invasive Devices Review  Invasive Devices  Report    Peripheral Intravenous Line            Peripheral IV 04/23/22 Left;Proximal;Ventral (anterior) Forearm 1 day    Peripheral IV 04/24/22 Right;Ventral (anterior) Forearm 1 day                Rationale for remaining devices: n/a  ---------------------------------------------------------------------------------------  Advance Directive and Living Will:      Power of :    POLST:    ---------------------------------------------------------------------------------------  Care Time Delivered:   No Critical Care time spent       KUSH Ricketts      Portions of the record may have been created with voice recognition software  Occasional wrong word or "sound a like" substitutions may have occurred due to the inherent limitations of voice recognition software    Read the chart carefully and recognize, using context, where substitutions have occurred

## 2022-04-25 NOTE — OCCUPATIONAL THERAPY NOTE
Occupational Therapy Evaluation      [facility]    4/25/2022    Patient Active Problem List   Diagnosis    Primary hypertension    Other hyperlipidemia    Diabetes mellitus type 2, insulin dependent ([facility] [address] )    Hypothyroidism    Basal cell carcinoma    Overweight (BMI 25 0-29  9)    Microalbuminuria    Hypomagnesemia    Acute respiratory failure with hypoxia (HCC)    Severe sepsis (HCC)    Right upper lobe pneumonia    Upper GI bleed    ABLA (acute blood loss anemia)    Acute cystitis without hematuria    Hypoxia    Elevated troponin level not due myocardial infarction    Lactic acidosis    Chronic respiratory failure (HCC)    SIRS (systemic inflammatory response syndrome) (HCC)    Abnormal CT of the chest    Compression fracture of body of thoracic vertebra (HCC)    Cirrhosis (HCC)    Elevated troponin       Past Medical History:   Diagnosis Date    Anemia     Arthritis     Basal cell carcinoma of skin of face     Bleeding ulcer     Diabetes mellitus ([facility] [address] )     Hypercholesterolemia     Hypertension     Hypothyroidism     Insulin dependent diabetes mellitus        Past Surgical History:   Procedure Laterality Date    CATARACT EXTRACTION Bilateral     MOHS SURGERY      REPLACEMENT TOTAL KNEE Right     SINUS SURGERY      TONSILLECTOMY      WISDOM TOOTH EXTRACTION          04/25/22 0823   OT Last Visit   OT Visit Date 04/25/22   Note Type   Note type Evaluation   Additional Comments Pt agreeable to OT eval  Upon arrival pt supine in bed with HOB elevated    Restrictions/Precautions   Weight Bearing Precautions Per Order No   Other Precautions Contact/isolation;Cognitive;Multiple lines;Telemetry;O2;Fall Risk  (3 L O2 via NC- not used at baseline )   Pain Assessment   Pain Assessment Tool 0-10   Pain Score No Pain   Home Living   Type of Home Assisted living  ([facility] )   Home Layout Multi-level;Performs ADLs on one level; Able to live on main level with bedroom/bathroom; Access; Elevator   Bathroom Shower/Tub Walk-in shower   Bathroom Toilet Raised   Bathroom Equipment Grab bars in shower; Shower chair;Grab bars around toilet   [address] [facility]  (utilizes RW at baseline )   Prior Function   Level of [place] Needs assistance with ADLs and functional mobility; Needs assistance with IADLs   Lives With Facility staff   Receives Help From Personal care attendant   ADL Assistance Needs assistance   IADLs Needs assistance   Falls in the last 6 months 0  (pt denies any falls)   Vocational Retired   Comments Pt is a questionable historian d/t cognitive impairment  Will follow-up with CM   Lifestyle   Autonomy Patient requires assistance with ADLs/IADLs, ambulatory with RW and lives at [facility] [address] [facility]    Service to Others Retired     [address] Enjoys listening to Nasreen Donovan 8 5  Gonzalez Mccracken [phone] 4  [address] 3  Moderate Assistance   [address] 3  Moderate [address]  3  Moderate Assistance   Additional Comments Pt limited by decreased cognition, activity tolerance and balance  Bed Mobility   Supine to Sit 4  Minimal assistance   Additional items Assist x 1;HOB elevated; Bedrails; Increased time required;Verbal cues;LE management   Sit to Supine   (DNT: pt seated in recliner at end of eval )   Additional Comments Pt reported (+) lightheadedness upon sitting at EOB  Provided pt increased seated rest time  Transfers   Sit to Stand 4  Minimal assistance   Additional items Assist x 1; Increased time required;Verbal cues   Stand to Sit 4  Minimal assistance   Additional items Assist x 1; Armrests; Increased time required;Verbal cues   Toilet transfer 4  Minimal assistance Additional items Assist x 1; Increased time required;Verbal cues; Commode   Additional Comments Verbal cues provided for sequencing and proper body mechanics  Functional Mobility   Functional Mobility 4  Minimal assistance   Additional Comments Pt ambulated short distance bed>commode>recliner with no overt LOB  Pt noted to have moderate SOB with mobility but SpO2 >90% throughout session  Additional items Rolling walker   Balance   Static Sitting Fair +   Dynamic Sitting Fair   Static Standing Fair -   Dynamic Standing Poor +   Activity Tolerance   Activity Tolerance Patient limited by fatigue   Medical Staff Made Aware Pt seen as a co-eval with PT due to the patient's co-morbidities, clinically unstable presentation, and present impairments which are a regression from the patient's baseline  Nurse Made Aware NABOR Carver verbalized pt appropriate for therapy and made aware of outcomes    RUE Assessment   RUE Assessment WFL  (grossly 3+/5 MMT)   LUE Assessment   LUE Assessment WFL  (grossly 3+/5 MMT)   Hand Function   Gross Motor Coordination Functional   Fine Motor Coordination Functional   Sensation   Light Touch No apparent deficits   Vision-Basic Assessment   Current Vision Wears glasses all the time   Cognition   Overall Cognitive Status Impaired   Arousal/Participation Alert; Responsive; Cooperative   Attention Attends with cues to redirect   Orientation Level Oriented to person;Disoriented to place; Disoriented to time;Disoriented to situation  (pt oriented to month but disoriented to year )   Memory Decreased recall of precautions;Decreased recall of biographical information   Following Commands Follows one step commands with increased time or repetition   Assessment   Limitation Decreased ADL status; Decreased UE strength;Decreased Safe judgement during ADL;Decreased cognition;Decreased endurance;Decreased self-care trans;Decreased high-level ADLs   Prognosis Good   Assessment Patient is a 80 y o  female seen for OT evaluation s/p admit to [facility] on 4/23/2022 w/Upper GI bleed  Commorbidities affecting patient's functional performance at time of assessment include: acute blood loss anemia, cirrhosis, chronic respiratory failure, HLD, HTN and hypothyroidism  Orders placed for OT evaluation and treatment and up with assistance  Performed at least two patient identifiers during session including name and wristband  Prior to admission, Patient requires assistance with ADLs/IADLs, ambulatory with RW and lives at [facility]  Personal factors affecting patient at time of initial evaluation include: limited insight into deficits, decreased initiation and engagement, difficulty performing ADLs and difficulty performing IADLs  Upon evaluation, patient requires minimal  assist for UB ADLs, moderate assist for LB ADLs, transfers and functional ambulation in room and bathroom with minimal  assist, with the use of Rolling Walker  Patient is oriented to place,, disoriented to time,, disoriented to place, and disoriented to situation, and presents with impaired judgement, inability to make safe decisions  Occupational performance is affected by the following deficits: orientation, decreased muscle strength, dynamic sit/ stand balance deficit with poor standing tolerance time for self care and functional mobility, decreased activity tolerance, impaired memory, impaired problem solving, decreased safety awareness and delayed righting and equilibrium reactions  Based on the mentioned OT evaluation outcomes, functional performance deficits, and assessment findings, pt has been identified as a high complexity evaluation  Patient to benefit from continued Occupational Therapy treatment while in the hospital to address deficits as defined above and maximize level of functional independence with ADLs and functional mobility   Occupational Performance areas to address include: eating, grooming , bathing/ shower, dressing, toilet hygiene, transfer to all surfaces and functional ambulation  From OT standpoint, recommendation at time of d/c would be Return to facility with rehabilitation services  Goals   Patient Goals to get better    Plan   Treatment Interventions ADL retraining;Functional transfer training;UE strengthening/ROM; Endurance training;Cognitive reorientation;Patient/family training;Equipment evaluation/education; Compensatory technique education; Energy conservation; Activityengagement   Goal Expiration Date 05/05/22   OT Treatment Day 0   OT Frequency 3-5x/wk   Recommendation   OT Discharge Recommendation Return to facility with rehabilitation services   OT - OK to Discharge Yes  (Once medically cleared )   Additional Comments  At end of eval, pt seated OOB in recliner with all needs met    Additional Comments 2 The patient's raw score on the AM-PAC Daily Activity inpatient short form is 15, standardized score is 34 69, less than 39 4  Patients at this level are likely to benefit from discharge to post-acute rehabilitation services  Please refer to the recommendation of the Occupational Therapist for safe discharge planning     AM-PAC Daily Activity Inpatient   Lower Body Dressing 2   Bathing 2   Toileting 2   Upper Body Dressing 3   Grooming 3   Eating 3   Daily Activity Raw Score 15   Daily Activity Standardized Score (Calc for Raw Score >=11) 34 69   AM-PAC Applied Cognition Inpatient   Following a Speech/Presentation 3   Understanding Ordinary Conversation 4   Taking Medications 2   Remembering Where Things Are Placed or Put Away 1   Remembering List of 4-5 Errands 1   Taking Care of Complicated Tasks 1   Applied Cognition Raw Score 12   Applied Cognition Standardized Score 28 82     GOALS:    *ADL transfers with (S) for inc'd independence with ADLs/purposeful tasks    *UB ADL with (S) for inc'd independence with self cares    *LB ADL with CGA using AE prn for inc'd independence with self cares    *Toileting with CGA for clothing management and hygiene for return to PLOF with personal care    *Increase stand tolerance x4 m for inc'd tolerance with standing purposeful tasks    *Participate in 10m UE therex to increase overall stamina/activity tolerance for purposeful tasks    *Bed mobility- (S) for inc'd independence to manage own comfort and initiate EOB & OOB purposeful tasks    *Patient will verbalize 3 safety awareness/ principles to prevent falls in the home setting  *Patient will verbalize and demonstrate use of energy conservation/deep breathing techniques and work simplification skills during functional activities with no verbal cues  *Patient will increase OOB/sitting tolerance to 2-4 hours per day to increase participation in self-care and leisure tasks with no s/s of exertion       *Pt will demonstrate use of long handled AE during 100% of tx sessions for increased ADL safety and independence following D/C     EFREN Alexis, OTR/L

## 2022-04-25 NOTE — PLAN OF CARE
Problem: PHYSICAL THERAPY ADULT  Goal: Performs mobility at highest level of function for planned discharge setting  See evaluation for individualized goals  Description: Treatment/Interventions: Functional transfer training,LE strengthening/ROM,Therapeutic exercise,Endurance training,Cognitive reorientation,Patient/family training,Equipment eval/education,Bed mobility,Gait training  Equipment Recommended:  (TBD pending progress)       See flowsheet documentation for full assessment, interventions and recommendations  Note: Prognosis: Fair  Problem List: Decreased strength,Decreased endurance,Impaired balance,Decreased mobility,Decreased safety awareness,Decreased cognition  Assessment: Pt is 80 y o  female seen for high-complexity PT evaluation on 4/25/2022 s/p admit to [facility] on 4/23/2022 w/ Upper GI bleed  PT was consulted to assess pt's functional mobility and d/c needs  Order placed for PT eval and tx, w/ up w/ A order  PTA, pt resides at [facility]  At time of eval, pt requires min Ax1 for all mobility tasks, tolerated amb x 3 ft x 2 trials  Upon evaluation, pt presenting with impaired functional mobility d/t decreased strength, decreased endurance, impaired balance, decreased mobility, decreased cognition and decreased safety awareness  Pertinent PMHx and current co-morbidities affecting pt's physical performance at time of assessment include: acute blood loss anemia, cirrhosis, chronic respiratory failure, HLD, HTN and hypothyroidism  Personal factors affecting pt at time of eval include: decreased cognition  The following objective measures performed on IE also reveal limitations: AM-PAC 6-Clicks: 85/37  Pt's clinical presentation is currently unstable/unpredictable seen in pt's presentation of abnormal lab value(s)   Overall, pt's rehab potential and prognosis to return to PLOF is fair as impacted by objective findings, warranting pt to receive further skilled PT interventions to address identified impairments, activity limitation(s), and participation restriction(s)  Pt to benefit from continued PT tx to address deficits as defined above and maximize level of functional independent mobility and consistency in order for pt to improve activity tolerance  From PT/mobility standpoint, recommendation at time of d/c would be return to facility with rehabilitation services pending progress in order to facilitate return to PLOF  Barriers to Discharge: Inaccessible home environment        PT Discharge Recommendation: Return to facility with rehabilitation services          See flowsheet documentation for full assessment

## 2022-04-25 NOTE — ASSESSMENT & PLAN NOTE
· Patient presented with an initial hemoglobin of 7 2 and a baseline hemoglobin between 12-14  · Hemoglobin 8 5 on 4/21 prior to discharge, 1 3 gram drop  · Patient had declined outpatient EGD for UGIB during recent hospitalization and this is likely due to a slow ooze from history of bleeding ulcer  · Transfused 2 units pRBC  · See details of EGD above  · Transfuse for hemoglobin <7, symptomatic anemia or active bleeding

## 2022-04-25 NOTE — QUICK NOTE
Family and patient updated at bedside by Dr Asha López  All questions/concerns were answered and addressed

## 2022-04-25 NOTE — PLAN OF CARE
Problem: PAIN - ADULT  Goal: Verbalizes/displays adequate comfort level or baseline comfort level  Description: Interventions:  - Encourage patient to monitor pain and request assistance  - Assess pain using appropriate pain scale  - Administer analgesics based on type and severity of pain and evaluate response  - Implement non-pharmacological measures as appropriate and evaluate response  - Consider cultural and social influences on pain and pain management  - Notify physician/advanced practitioner if interventions unsuccessful or patient reports new pain  Outcome: Progressing     Problem: INFECTION - ADULT  Goal: Absence or prevention of progression during hospitalization  Description: INTERVENTIONS:  - Assess and monitor for signs and symptoms of infection  - Monitor lab/diagnostic results  - Monitor all insertion sites, i e  indwelling lines, tubes, and drains  - Monitor endotracheal if appropriate and nasal secretions for changes in amount and color  - [place] appropriate cooling/warming therapies per order  - Administer medications as ordered  - Instruct and encourage patient and family to use good hand hygiene technique  - Identify and instruct in appropriate isolation precautions for identified infection/condition  Outcome: Progressing     Problem: SAFETY ADULT  Goal: Patient will remain free of falls  Description: INTERVENTIONS:  - Educate patient/family on patient safety including physical limitations  - Instruct patient to call for assistance with activity   - Consult OT/PT to assist with strengthening/mobility   - Keep Call bell within reach  - Keep bed low and locked with side rails adjusted as appropriate  - Keep care items and personal belongings within reach  - Initiate and maintain comfort rounds  - Make Fall Risk Sign visible to staff  - Offer Toileting every 2 Hours, in advance of need  - Apply yellow socks and bracelet for high fall risk patients  - Consider moving patient to room near nurses station  Outcome: Progressing     Problem: SAFETY ADULT  Goal: Maintain or return to baseline ADL function  Description: INTERVENTIONS:  -  Assess patient's ability to carry out ADLs; assess patient's baseline for ADL function and identify physical deficits which impact ability to perform ADLs (bathing, care of mouth/teeth, toileting, grooming, dressing, etc )  - Assess/evaluate cause of self-care deficits   - Assess range of motion  - Assess patient's mobility; develop plan if impaired  - Assess patient's need for assistive devices and provide as appropriate  - Encourage maximum independence but intervene and supervise when necessary  - Involve family in performance of ADLs  - Assess for home care needs following discharge   - Consider OT consult to assist with ADL evaluation and planning for discharge  - Provide patient education as appropriate  Outcome: Not Progressing

## 2022-04-25 NOTE — ASSESSMENT & PLAN NOTE
Lab Results   Component Value Date    HGBA1C 5 7 (H) 04/24/2022       Recent Labs     04/24/22  0549 04/24/22  1202 04/24/22  1713 04/24/22 2059   POCGLU 170* 206* 160* 143*       Blood Sugar Average: Last 72 hrs:  (P) 205 [id number]   · Hold home 70/30 and Glucovance  · Continue accuchecks Q6 with SSI while NPO  · Hypoglycemia protocol  · Hgb A1c pending

## 2022-04-25 NOTE — QUICK NOTE
Matthew Cummings is a 79 yo F her with upper GI bleed  Patient received 2 units PRBC's  S/p EGD 4/24/22- Two large gastric AVMs noted  Hemoglobin slowly down trending  Patient otherwise stable and transferred from critical care service to med/surg  Critical care attending spoke with the patient's family- they are interested in hospice  Hospice to have family meeting tomorrow   Patient will be seen by hospitalist service on 4/26/22

## 2022-04-26 LAB
ANION GAP SERPL CALCULATED.3IONS-SCNC: 5 MMOL/L (ref 4–13)
BUN SERPL-MCNC: 24 MG/DL (ref 5–25)
CALCIUM SERPL-MCNC: 8.7 MG/DL (ref 8.4–10.2)
CHLORIDE SERPL-SCNC: 102 MMOL/L (ref 96–108)
CO2 SERPL-SCNC: 28 MMOL/L (ref 21–32)
CREAT SERPL-MCNC: 1.07 MG/DL (ref 0.6–1.3)
ERYTHROCYTE [DISTWIDTH] IN BLOOD BY AUTOMATED COUNT: 17.5 % (ref 11.6–15.1)
GFR SERPL CREATININE-BSD FRML MDRD: 44 ML/MIN/1.73SQ M
GLUCOSE SERPL-MCNC: 165 MG/DL (ref 65–140)
GLUCOSE SERPL-MCNC: 173 MG/DL (ref 65–140)
GLUCOSE SERPL-MCNC: 186 MG/DL (ref 65–140)
GLUCOSE SERPL-MCNC: 222 MG/DL (ref 65–140)
GLUCOSE SERPL-MCNC: 244 MG/DL (ref 65–140)
HCT VFR BLD AUTO: 25.8 % (ref 34.8–46.1)
HGB BLD-MCNC: 7.7 G/DL (ref 11.5–15.4)
HGB BLD-MCNC: 7.7 G/DL (ref 11.5–15.4)
MAGNESIUM SERPL-MCNC: 1.9 MG/DL (ref 1.9–2.7)
MCH RBC QN AUTO: 29.3 PG (ref 26.8–34.3)
MCHC RBC AUTO-ENTMCNC: 29.8 G/DL (ref 31.4–37.4)
MCV RBC AUTO: 98 FL (ref 82–98)
PHOSPHATE SERPL-MCNC: 2.7 MG/DL (ref 2.3–4.1)
PLATELET # BLD AUTO: 201 THOUSANDS/UL (ref 149–390)
PMV BLD AUTO: 10.4 FL (ref 8.9–12.7)
POTASSIUM SERPL-SCNC: 4.3 MMOL/L (ref 3.5–5.3)
RBC # BLD AUTO: 2.63 MILLION/UL (ref 3.81–5.12)
SODIUM SERPL-SCNC: 135 MMOL/L (ref 135–147)
WBC # BLD AUTO: 7.64 THOUSAND/UL (ref 4.31–10.16)

## 2022-04-26 PROCEDURE — 82948 REAGENT STRIP/BLOOD GLUCOSE: CPT

## 2022-04-26 PROCEDURE — 99233 SBSQ HOSP IP/OBS HIGH 50: CPT | Performed by: NURSE PRACTITIONER

## 2022-04-26 PROCEDURE — 80048 BASIC METABOLIC PNL TOTAL CA: CPT | Performed by: NURSE PRACTITIONER

## 2022-04-26 PROCEDURE — 99232 SBSQ HOSP IP/OBS MODERATE 35: CPT | Performed by: STUDENT IN AN ORGANIZED HEALTH CARE EDUCATION/TRAINING PROGRAM

## 2022-04-26 PROCEDURE — 83735 ASSAY OF MAGNESIUM: CPT | Performed by: NURSE PRACTITIONER

## 2022-04-26 PROCEDURE — 85027 COMPLETE CBC AUTOMATED: CPT | Performed by: NURSE PRACTITIONER

## 2022-04-26 PROCEDURE — 85018 HEMOGLOBIN: CPT | Performed by: NURSE PRACTITIONER

## 2022-04-26 PROCEDURE — 84100 ASSAY OF PHOSPHORUS: CPT | Performed by: NURSE PRACTITIONER

## 2022-04-26 RX ADMIN — PANTOPRAZOLE SODIUM 40 MG: 40 TABLET, DELAYED RELEASE ORAL at 16:40

## 2022-04-26 RX ADMIN — PANTOPRAZOLE SODIUM 40 MG: 40 TABLET, DELAYED RELEASE ORAL at 09:25

## 2022-04-26 RX ADMIN — ACETAMINOPHEN 650 MG: 325 TABLET ORAL at 22:36

## 2022-04-26 RX ADMIN — IRON SUCROSE 300 MG: 20 INJECTION, SOLUTION INTRAVENOUS at 09:44

## 2022-04-26 RX ADMIN — LIDOCAINE 1 PATCH: 50 PATCH TOPICAL at 09:25

## 2022-04-26 RX ADMIN — PRAVASTATIN SODIUM 40 MG: 40 TABLET ORAL at 09:25

## 2022-04-26 RX ADMIN — INSULIN LISPRO 2 UNITS: 100 INJECTION, SOLUTION INTRAVENOUS; SUBCUTANEOUS at 22:27

## 2022-04-26 RX ADMIN — LEVOTHYROXINE SODIUM 100 MCG: 100 TABLET ORAL at 06:19

## 2022-04-26 RX ADMIN — Medication 6 MG: at 22:27

## 2022-04-26 NOTE — PLAN OF CARE
Problem: PAIN - ADULT  Goal: Verbalizes/displays adequate comfort level or baseline comfort level  Description: Interventions:  - Encourage patient to monitor pain and request assistance  - Assess pain using appropriate pain scale  - Administer analgesics based on type and severity of pain and evaluate response  - Implement non-pharmacological measures as appropriate and evaluate response  - Consider cultural and social influences on pain and pain management  - Notify physician/advanced practitioner if interventions unsuccessful or patient reports new pain  Outcome: Progressing     Problem: INFECTION - ADULT  Goal: Absence or prevention of progression during hospitalization  Description: INTERVENTIONS:  - Assess and monitor for signs and symptoms of infection  - Monitor lab/diagnostic results  - Monitor all insertion sites, i e  indwelling lines, tubes, and drains  - Monitor endotracheal if appropriate and nasal secretions for changes in amount and color  - [place] appropriate cooling/warming therapies per order  - Administer medications as ordered  - Instruct and encourage patient and family to use good hand hygiene technique  - Identify and instruct in appropriate isolation precautions for identified infection/condition  Outcome: Progressing  Goal: Absence of fever/infection during neutropenic period  Description: INTERVENTIONS:  - Monitor WBC    Outcome: Progressing     Problem: SAFETY ADULT  Goal: Patient will remain free of falls  Description: INTERVENTIONS:  - Educate patient/family on patient safety including physical limitations  - Instruct patient to call for assistance with activity   - Consult OT/PT to assist with strengthening/mobility   - Keep Call bell within reach  - Keep bed low and locked with side rails adjusted as appropriate  - Keep care items and personal belongings within reach  - Initiate and maintain comfort rounds  - Make Fall Risk Sign visible to staff  - Offer Toileting in advance of need  - Initiate/Maintain bed alarm  - Obtain necessary fall risk management equipment:   - Apply yellow socks and bracelet for high fall risk patients  - Consider moving patient to room near nurses station  Outcome: Progressing  Goal: Maintain or return to baseline ADL function  Description: INTERVENTIONS:  -  Assess patient's ability to carry out ADLs; assess patient's baseline for ADL function and identify physical deficits which impact ability to perform ADLs (bathing, care of mouth/teeth, toileting, grooming, dressing, etc )  - Assess/evaluate cause of self-care deficits   - Assess range of motion  - Assess patient's mobility; develop plan if impaired  - Assess patient's need for assistive devices and provide as appropriate  - Encourage maximum independence but intervene and supervise when necessary  - Involve family in performance of ADLs  - Assess for home care needs following discharge   - Consider OT consult to assist with ADL evaluation and planning for discharge  - Provide patient education as appropriate  Outcome: Progressing  Goal: Maintains/Returns to pre admission functional level  Description: INTERVENTIONS:  - Perform BMAT or MOVE assessment daily    - Set and communicate daily mobility goal to care team and patient/family/caregiver  - Collaborate with rehabilitation services on mobility goals if consulted  - Reposition patient every two hours    - Out of bed to chair   - Out of bed for meals  - Out of bed for toileting  - Record patient progress and toleration of activity level   Outcome: Progressing     Problem: DISCHARGE PLANNING  Goal: Discharge to home or other facility with appropriate resources  Description: INTERVENTIONS:  - Identify barriers to discharge w/patient and caregiver  - Arrange for needed discharge resources and transportation as appropriate  - Identify discharge learning needs (meds, wound care, etc )  - Refer to Case Management Department for coordinating discharge planning if the patient needs post-hospital services based on physician/advanced practitioner order or complex needs related to functional status, cognitive ability, or social support system  Outcome: Progressing     Problem: Knowledge Deficit  Goal: Patient/family/caregiver demonstrates understanding of disease process, treatment plan, medications, and discharge instructions  Description: Complete learning assessment and assess knowledge base  Interventions:  - Provide teaching at level of understanding  - Provide teaching via preferred learning methods  Outcome: Progressing     Problem: MOBILITY - ADULT  Goal: Maintain or return to baseline ADL function  Description: INTERVENTIONS:  -  Assess patient's ability to carry out ADLs; assess patient's baseline for ADL function and identify physical deficits which impact ability to perform ADLs (bathing, care of mouth/teeth, toileting, grooming, dressing, etc )  - Assess/evaluate cause of self-care deficits   - Assess range of motion  - Assess patient's mobility; develop plan if impaired  - Assess patient's need for assistive devices and provide as appropriate  - Encourage maximum independence but intervene and supervise when necessary  - Involve family in performance of ADLs  - Assess for home care needs following discharge   - Consider OT consult to assist with ADL evaluation and planning for discharge  - Provide patient education as appropriate  Outcome: Progressing  Goal: Maintains/Returns to pre admission functional level  Description: INTERVENTIONS:  - Perform BMAT or MOVE assessment daily    - Set and communicate daily mobility goal to care team and patient/family/caregiver  - Collaborate with rehabilitation services on mobility goals if consulted  - Reposition patient every two hours    - Out of bed to chair   - Out of bed for meals  - Out of bed for toileting  - Record patient progress and toleration of activity level   Outcome: Progressing     Problem: Potential for Falls  Goal: Patient will remain free of falls  Description: INTERVENTIONS:  - Educate patient/family on patient safety including physical limitations  - Instruct patient to call for assistance with activity   - Consult OT/PT to assist with strengthening/mobility   - Keep Call bell within reach  - Keep bed low and locked with side rails adjusted as appropriate  - Keep care items and personal belongings within reach  - Initiate and maintain comfort rounds  - Make Fall Risk Sign visible to staff  - Offer Toileting in advance of need  - Initiate/Maintain bed alarm  - Obtain necessary fall risk management equipment:   - Apply yellow socks and bracelet for high fall risk patients  - Consider moving patient to room near nurses station  Outcome: Progressing     Problem: Prexisting or High Potential for Compromised Skin Integrity  Goal: Skin integrity is maintained or improved  Description: INTERVENTIONS:  - Identify patients at risk for skin breakdown  - Assess and monitor skin integrity  - Assess and monitor nutrition and hydration status  - Monitor labs   - Assess for incontinence   - Turn and reposition patient  - Assist with mobility/ambulation  - Relieve pressure over bony prominences  - Avoid friction and shearing  - Provide appropriate hygiene as needed including keeping skin clean and dry  - Evaluate need for skin moisturizer/barrier cream  - Collaborate with interdisciplinary team   - Patient/family teaching  - Consider wound care consult   Outcome: Progressing

## 2022-04-26 NOTE — ASSESSMENT & PLAN NOTE
· Troponin elevated on arrival at 320--> peaked at 763, is trending down  · Noted to have been elevated on recent hospitalization as well and was determined to be non-MI elevation due to sepsis  · Likely secondary to demand ischemia from ABLA and hemorrhagic shock  · EKG unremarkable

## 2022-04-26 NOTE — ASSESSMENT & PLAN NOTE
· Currently utilizing 2 L since diagnosis of pneumonia in December  · Per chart review, family has declined workup for etiology, however patient is now considering hospice  · Monitor oxygen requirements

## 2022-04-26 NOTE — ASSESSMENT & PLAN NOTE
· Superior endplate compression fracture of T12 vertebral body EF of unknown chronicity  · Lidoderm patch to back Q12 hr  · Tylenol PRN  · PT/OT

## 2022-04-26 NOTE — CASE MANAGEMENT
Case Management Discharge Planning Note    Patient name Nirav Art  Location /-01 MRN [id number]  : 1929 Date 2022       Current Admission Date: 2022  Current Admission Diagnosis:Upper GI bleed   Patient Active Problem List    Diagnosis Date Noted    Elevated troponin 2022    Chronic respiratory failure ([address] ) 2022    SIRS (systemic inflammatory response syndrome) ([facility] ) 2022    Abnormal CT of the chest 2022    Compression fracture of body of thoracic vertebra ([facility] ) 2022    Cirrhosis ([facility] ) 2022    Severe sepsis ([facility] ) 2022    Right upper lobe pneumonia 2022    Upper GI bleed 2022    ABLA (acute blood loss anemia) 2022    Acute cystitis without hematuria 2022    Hypoxia 2022    Elevated troponin level not due myocardial infarction 2022    Lactic acidosis 2022    Acute respiratory failure with hypoxia ([facility] ) 2021    Hypomagnesemia 2021    Microalbuminuria 2020    Overweight (BMI 25 0-29 9) 2019    Other hyperlipidemia 2018    Diabetes mellitus type 2, insulin dependent ([facility] )     Hypothyroidism     Basal cell carcinoma     Primary hypertension 2018      LOS (days): 3  Geometric Mean LOS (GMLOS) (days): 4 40  Days to GMLOS:1 4     OBJECTIVE:  Risk of Unplanned Readmission Score: 19     Current admission status: Inpatient   Preferred Pharmacy:   [address] , [address] [address]  Phone: [phone] Fax: [phone] [facility] [place] [address] [place], [address]  [address] [place] [address]  Phone: [phone] Fax: [phone]    Primary Care Provider: Kayleen Healy MD    Primary Insurance: MEDICARE  Secondary Insurance: [address] [place] [address] DETAILS:  Received message from [facility] the DME will be delivered this pt  For hospice services  TC to the  [place] to notify of same  Spoke to [place] who will have the nurse call back  Hospice is requesting an 11am transport  Snet for same via [place]

## 2022-04-26 NOTE — PROGRESS NOTES
Progress Note- Sp Wesley 80 y o  female MRN: [id number]    Unit/Bed#: -01 Encounter: [id number]      Assessment and Plan:    Patient is a 19-year-old female with PMH significant for dementia and history of gastric AVMs who presented on 04/23 for symptomatic anemia  This is patient's 2nd admission for severe symptomatic anemia within the past week  Labs on admission notable for hemoglobin of 7 2 down from 8 5 two days prior  Has since received 2 units PRBCs total  Given readmission and continually down trending hgb requirng transfusion, recommended EGD for further evaluation  EGD on 04/24/2022 notable for normal appearing esophagus; 2 large gastric AVM this treated with APC; edematous mucosa with erosion the pylorus; normal appearing duodenum  Today's labs notable for continually down trending hgb (7 7)     Patient remains hemodynamically stable  Per nursing patient with continued liquid melenic stools  Of note, patient's family prefers to avoid invasive procedures and does not believe colonoscopy is within goals of care - Plans to speak with hospice today  Recommend to continue conservative therapies including twice daily PPI, monitoring hemoglobin with transfusions as necessary, monitoring stools for further signs of overt GI bleeding, and monitoring vitals closely  - Continue twice daily PPI x2 weeks; Transition to one daily thereafter  - Monitor hgb; Transfuse for hgb <7 0  - Monitor stools for further signs of overt GI bleeding  - Monitor vitals closely     GI will continue to follow  ______________________________________________________________________    Subjective:     Patient found lying comfortably in her bed  No acute overnight events  Patient states she is doing so-so, admits to some mild left lower quadrant abdominal tenderness  Per nursing, patient continues to have rectal bleeding (melenic-appearing stools)   Patient's family plans to speak with hospice today regarding goals of care     Medication Administration - last 24 hours from 04/25/2022 0828 to 04/26/2022 6058       Date/Time Order Dose Route Action Action by     04/26/2022 0619 levothyroxine tablet 100 mcg 100 mcg Oral Given Kaitlin Solis, NABOR     04/25/2022 0932 pravastatin (PRAVACHOL) tablet 40 mg 40 mg Oral Given Nicol Todd RN     04/25/2022 2054 lidocaine (LIDODERM) 5 % patch 1 patch 1 patch Topical Patch Removed Kaitlin Solis, NABOR     04/25/2022 0934 lidocaine (LIDODERM) 5 % patch 1 patch 1 patch Topical Medication Applied Nicol Todd, NABOR     04/25/2022 2215 melatonin tablet 6 mg 6 mg Oral Given Kaitlin Solis, NABOR     04/25/2022 5780 pantoprazole (PROTONIX) injection 40 mg 40 mg Intravenous Given Nicol Todd RN     04/25/2022 0786 insulin lispro (HumaLOG) 100 units/mL subcutaneous injection 1-6 Units 1 Units Subcutaneous Given Nicol Todd RN     04/25/2022 2215 insulin lispro (HumaLOG) 100 units/mL subcutaneous injection 1-5 Units 2 Units Subcutaneous Given Kaitlin Solis, NABOR     04/25/2022 1241 acetaminophen (TYLENOL) tablet 650 mg 650 mg Oral Given Nicol Todd RN     04/25/2022 0792 magnesium sulfate 2 g/50 mL IVPB (premix) 2 g 0 g Intravenous Stopped Kaitlin Solis, NABOR     04/25/2022 1804 insulin lispro (HumaLOG) 100 units/mL subcutaneous injection 1-6 Units 1 Units Subcutaneous Given Nicol Todd RN     04/25/2022 1137 insulin lispro (HumaLOG) 100 units/mL subcutaneous injection 1-6 Units 3 Units Subcutaneous Given Nicol Todd RN     04/25/2022 0932 potassium-sodium phosphates (PHOS-NAK) packet 1 packet 1 packet Oral Given Nicol Todd RN     04/25/2022 1241 furosemide (LASIX) injection 20 mg 20 mg Intravenous Given Nicol Todd RN     04/25/2022 1605 pantoprazole (PROTONIX) EC tablet 40 mg 40 mg Oral Given Nicol Todd RN     04/25/2022 1444 iron sucrose (VENOFER) 300 mg in sodium chloride 0 9 % 250 mL IVPB 300 mg Intravenous Not Given Nicol Todd RN     04/25/2022 1430 iron sucrose (VENOFER) 300 mg in sodium chloride 0 9 % 250 mL IVPB 300 mg Intravenous New Bag Marlon Ding RN          Objective:     Vitals: Blood pressure 99/56, pulse 90, temperature 97 5 °F (36 4 °C), resp  rate 17, height 5' 4" (1 626 m), weight 81 4 kg (179 lb 7 3 oz), SpO2 97 %  ,Body mass index is 30 8 kg/m²  Intake/Output Summary (Last 24 hours) at 4/26/2022 5980  Last data filed at 4/25/2022 1901  Gross per 24 hour   Intake 708 ml   Output 1600 ml   Net -892 ml       Physical Exam:   General Appearance: Awake and alert, in no acute distress  Abdomen: Soft, +mild LLQ abd TTP without guarding rebound or rigidity, non-distended; bowel sounds normal; no masses or no organomegaly    Invasive Devices  Report    Peripheral Intravenous Line            Peripheral IV 04/25/22 Dorsal (posterior); Right Forearm <1 day                Lab Results:  No results displayed because visit has over 200 results  Imaging Studies: I have personally reviewed pertinent imaging studies

## 2022-04-26 NOTE — ASSESSMENT & PLAN NOTE
· CT of chest revealed evidence of bilateral interstitial lung disease, also intrathoracic stomach  · Consider pulmonary consult versus outpatient follow-up  · Patient currently considering hospice

## 2022-04-26 NOTE — ASSESSMENT & PLAN NOTE
· Patient arrives pale with hemoglobin of 7 2  · History of upper GI bleeding about 10 years ago  · She has declined outpatient EGD follow-up   · She had received 2 units of PRBC while in the ICU  · EGD 4/242 large gastric AVMs treated with APC  · Continue Protonix twice daily for 2 weeks and then switch to daily  · Monitor hemoglobin  · Family meeting with hospice today at 10:00 a m

## 2022-04-26 NOTE — HOSPICE NOTE
Met with dgts at bedside to review services and they were agreeable  Consents were signed  Equipment ordered  Requesting an 11am transport tomorrow back to [facility] updated

## 2022-04-26 NOTE — ASSESSMENT & PLAN NOTE
· Presented with an initial hemoglobin of 7 2 and a baseline hemoglobin around 13  · Hemoglobin 8 5 on 4/21 prior to discharge, 1 3 gram drop  · Patient had declined outpatient EGD for UGIB during recent hospitalization and this is likely due to a slow ooze from history of bleeding ulcer  · Transfused 2 units pRBC  · See details of EGD   · Transfuse for hemoglobin <7, symptomatic anemia, or active bleeding

## 2022-04-26 NOTE — ASSESSMENT & PLAN NOTE
Lab Results   Component Value Date    HGBA1C 5 7 (H) 04/24/2022       Recent Labs     04/25/22  2105 04/26/22  0638 04/26/22  1129 04/26/22  1438   POCGLU 238* 186* 222* 165*       Blood Sugar Average: Last 72 hrs:  (P) 201 [id number]      · Diabetic diet as tolerated  · Fingerstick blood sugar with sliding scale coverage  · Hold home 70/30 and Glucovance

## 2022-04-26 NOTE — PROGRESS NOTES
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  · Continue Protonix twice daily for 2 weeks and then switch to daily  · Monitor hemoglobin  · Family meeting with hospice today at 10:00 a m        ABLA (acute blood loss anemia)  Assessment & Plan  · Presented with an initial hemoglobin of 7 2 and a baseline hemoglobin around 13  · Hemoglobin 8 5 on 4/21 prior to discharge, 1 3 gram drop  · Patient had declined outpatient EGD for UGIB during recent hospitalization and this is likely due to a slow ooze from history of bleeding ulcer  · Transfused 2 units pRBC  · See details of EGD   · Transfuse for hemoglobin <7, symptomatic anemia, or active bleeding      Elevated troponin  Assessment & Plan  · Troponin elevated on arrival at 320--> peaked at 763, is trending down  · Noted to have been elevated on recent hospitalization as well and was determined to be non-MI elevation due to sepsis  · Likely secondary to demand ischemia from ABLA and hemorrhagic shock  · EKG unremarkable    Cirrhosis ([address] )  Assessment & Plan  · Noted on CT  · AST ALT and T eder unremarkable  · Serial abdominal assessments    Compression fracture of body of thoracic vertebra (HCC)  Assessment & Plan  · Superior endplate compression fracture of T12 vertebral body EF of unknown chronicity  · Lidoderm patch to back Q12 hr  · Tylenol PRN  · PT/OT      Abnormal CT of the chest  Assessment & Plan  · CT of chest revealed evidence of bilateral interstitial lung disease, also intrathoracic stomach  · Consider pulmonary consult versus outpatient follow-up  · Patient currently considering hospice    Chronic respiratory failure ([facility] [address] )  Assessment & Plan  · Currently utilizing 2 L since diagnosis of pneumonia in December  · Per chart review, family has declined workup for etiology, however patient is now considering hospice  · Monitor oxygen requirements    Hypothyroidism  Assessment & Plan  · Continue levothyroxine    Diabetes mellitus type 2, insulin dependent [facility])  Assessment & Plan  Lab Results   Component Value Date    HGBA1C 5 7 (H) 2022       Recent Labs     22  2105 22  0638 22  1129 22  1438   POCGLU 238* 186* 222* 165*       Blood Sugar Average: Last 72 hrs:  (P) 201 [id number]      · Diabetic diet as tolerated  · Fingerstick blood sugar with sliding scale coverage  · Hold home 70/30 and Glucovance      Primary hypertension  Assessment & Plan  · BP reviewed  · --  · Monitor blood pressure    VTE Pharmacologic Prophylaxis:   Pharmacologic: Pharmacologic VTE Prophylaxis contraindicated due to GI bleeding  Mechanical VTE Prophylaxis in Place: Yes    Patient Centered Rounds: I have performed bedside rounds with nursing staff today  Discussions with Specialists or Other Care Team Provider:  Case management    Education and Discussions with Family / Patient:  Family    Time Spent for Care: 30 minutes  More than 50% of total time spent on counseling and coordination of care as described above  Current Length of Stay: 3 day(s)    Current Patient Status: Inpatient   Certification Statement: The patient will continue to require additional inpatient hospital stay due to Per plan above    Discharge Plan: To be determined    Code Status: Level 3 - DNAR and DNI      Subjective:   Patient seen and examined  No complaints offered       Objective:     Vitals:   Temp (24hrs), Av 6 °F (36 4 °C), Min:97 °F (36 1 °C), Max:98 6 °F (37 °C)    Temp:  [97 °F (36 1 °C)-98 6 °F (37 °C)] 97 3 °F (36 3 °C)  HR:  [86-93] 92  Resp:  [17-45] 20  BP: ()/(50-64) 111/58  SpO2:  [96 %-98 %] 96 %  Body mass index is 30 8 kg/m²  Input and Output Summary (last 24 hours): Intake/Output Summary (Last 24 hours) at 4/26/2022 1740  Last data filed at 4/26/2022 1300  Gross per 24 hour   Intake 360 ml   Output 850 ml   Net -490 ml       Physical Exam:     Physical Exam  Vitals and nursing note reviewed  HENT:      Head: Normocephalic and atraumatic  Mouth/Throat:      Pharynx: Oropharynx is clear  Eyes:      Pupils: Pupils are equal, round, and reactive to light  Cardiovascular:      Rate and Rhythm: Normal rate  Pulmonary:      Effort: Pulmonary effort is normal  No respiratory distress  Breath sounds: Decreased breath sounds present  Abdominal:      General: Bowel sounds are normal       Palpations: Abdomen is soft  Tenderness: There is no abdominal tenderness  Musculoskeletal:      Cervical back: Neck supple  Skin:     General: Skin is warm and dry  Capillary Refill: Capillary refill takes less than 2 seconds  Neurological:      General: No focal deficit present  Mental Status: She is alert  Additional Data:     Labs:    Results from last 7 days   Lab Units 04/26/22  1709 04/26/22 0441 04/26/22 0441 04/25/22  1129 04/25/22  0448   WBC Thousand/uL  --   --  7 64  --  11 03*   HEMOGLOBIN g/dL 7 7*   < > 7 7*   < > 8 6*   HEMATOCRIT %  --   --  25 8*  --  28 6*   PLATELETS Thousands/uL  --   --  201  --  197   NEUTROS PCT %  --   --   --   --  80*   LYMPHS PCT %  --   --   --   --  9*   MONOS PCT %  --   --   --   --  8   EOS PCT %  --   --   --   --  2    < > = values in this interval not displayed       Results from last 7 days   Lab Units 04/26/22  0441 04/25/22  1615 04/25/22  0448   SODIUM mmol/L 135   < > 137   POTASSIUM mmol/L 4 3   < > 4 4   CHLORIDE mmol/L 102   < > 105   CO2 mmol/L 28   < > 26   BUN mg/dL 24   < > 23 CREATININE mg/dL 1 07   < > 0 93   ANION GAP mmol/L 5   < > 6   CALCIUM mg/dL 8 7   < > 8 8   ALBUMIN g/dL  --   --  3 2*   TOTAL BILIRUBIN mg/dL  --   --  0 94   ALK PHOS U/L  --   --  37   ALT U/L  --   --  8   AST U/L  --   --  14   GLUCOSE RANDOM mg/dL 173*   < > 143*    < > = values in this interval not displayed  Results from last 7 days   Lab Units 04/23/22  1325   INR  1 16     Results from last 7 days   Lab Units 04/26/22  1438 04/26/22  1129 04/26/22  2973 04/25/22  2105 04/25/22  1615 04/25/22  1056 04/25/22  0729 04/24/22  2059 04/24/22  1713 04/24/22  1202 04/24/22  0549 04/24/22  0002   POC GLUCOSE mg/dl 165* 222* 186* 238* 167* 239* 167* 143* 160* 206* 170* 132     Results from last 7 days   Lab Units 04/24/22  0458   HEMOGLOBIN A1C % 5 7*     Results from last 7 days   Lab Units 04/24/22  0458 04/23/22  1909 04/23/22  1325 04/20/22  0545   LACTIC ACID mmol/L  --  1 9  --   --    PROCALCITONIN ng/ml 0 12  --  0 14 0 09           * I Have Reviewed All Lab Data Listed Above  * Additional Pertinent Lab Tests Reviewed: [facility]    Imaging:  Recent Cultures (last 7 days):     Results from last 7 days   Lab Units 04/23/22  1743 04/23/22  1713   BLOOD CULTURE  No Growth at 48 hrs  No Growth at 48 hrs         Last 24 Hours Medication List:   Current Facility-Administered Medications   Medication Dose Route Frequency Provider Last Rate    acetaminophen  650 mg Oral Q6H PRN KUSH Nobles      insulin lispro  1-5 Units Subcutaneous HS KUSH Nobles      insulin lispro  1-6 Units Subcutaneous TID AC KUSH Nobles      iron sucrose  300 mg Intravenous Daily KUSH Jimenez      levothyroxine  100 mcg Oral Daily KUSH Nobles      lidocaine  1 patch Topical Daily KUSH Nobles      melatonin  6 mg Oral HS KUSH Nobles      ondansetron  4 mg Intravenous Q8H PRN KUSH Jimenez  pantoprazole  40 mg Oral BID AC KUSH Nobles      pravastatin  40 mg Oral Daily KUSH Kc          Today, Patient Was Seen By: KUSH Gama    ** Please Note: Dictation voice to text software may have been used in the creation of this document   **

## 2022-04-27 ENCOUNTER — APPOINTMENT (INPATIENT)
Dept: RADIOLOGY | Facility: HOSPITAL | Age: 87
DRG: 377 | End: 2022-04-27
Payer: MEDICARE

## 2022-04-27 PROBLEM — J90 PLEURAL EFFUSION: Status: ACTIVE | Noted: 2022-04-27

## 2022-04-27 LAB
BASOPHILS # BLD AUTO: 0.02 THOUSANDS/ΜL (ref 0–0.1)
BASOPHILS NFR BLD AUTO: 0 % (ref 0–1)
EOSINOPHIL # BLD AUTO: 0.15 THOUSAND/ΜL (ref 0–0.61)
EOSINOPHIL NFR BLD AUTO: 2 % (ref 0–6)
ERYTHROCYTE [DISTWIDTH] IN BLOOD BY AUTOMATED COUNT: 17.5 % (ref 11.6–15.1)
GLUCOSE SERPL-MCNC: 210 MG/DL (ref 65–140)
GLUCOSE SERPL-MCNC: 213 MG/DL (ref 65–140)
GLUCOSE SERPL-MCNC: 229 MG/DL (ref 65–140)
GLUCOSE SERPL-MCNC: 241 MG/DL (ref 65–140)
HCT VFR BLD AUTO: 25.8 % (ref 34.8–46.1)
HGB BLD-MCNC: 7.6 G/DL (ref 11.5–15.4)
IMM GRANULOCYTES # BLD AUTO: 0.08 THOUSAND/UL (ref 0–0.2)
IMM GRANULOCYTES NFR BLD AUTO: 1 % (ref 0–2)
LYMPHOCYTES # BLD AUTO: 1.19 THOUSANDS/ΜL (ref 0.6–4.47)
LYMPHOCYTES NFR BLD AUTO: 15 % (ref 14–44)
MCH RBC QN AUTO: 29.7 PG (ref 26.8–34.3)
MCHC RBC AUTO-ENTMCNC: 29.5 G/DL (ref 31.4–37.4)
MCV RBC AUTO: 101 FL (ref 82–98)
MONOCYTES # BLD AUTO: 0.74 THOUSAND/ΜL (ref 0.17–1.22)
MONOCYTES NFR BLD AUTO: 9 % (ref 4–12)
NEUTROPHILS # BLD AUTO: 5.87 THOUSANDS/ΜL (ref 1.85–7.62)
NEUTS SEG NFR BLD AUTO: 73 % (ref 43–75)
NRBC BLD AUTO-RTO: 1 /100 WBCS
PLATELET # BLD AUTO: 208 THOUSANDS/UL (ref 149–390)
PMV BLD AUTO: 10.9 FL (ref 8.9–12.7)
RBC # BLD AUTO: 2.56 MILLION/UL (ref 3.81–5.12)
WBC # BLD AUTO: 8.05 THOUSAND/UL (ref 4.31–10.16)

## 2022-04-27 PROCEDURE — 82948 REAGENT STRIP/BLOOD GLUCOSE: CPT

## 2022-04-27 PROCEDURE — 71045 X-RAY EXAM CHEST 1 VIEW: CPT

## 2022-04-27 PROCEDURE — 99233 SBSQ HOSP IP/OBS HIGH 50: CPT | Performed by: NURSE PRACTITIONER

## 2022-04-27 PROCEDURE — 85025 COMPLETE CBC W/AUTO DIFF WBC: CPT | Performed by: NURSE PRACTITIONER

## 2022-04-27 RX ORDER — FUROSEMIDE 10 MG/ML
40 INJECTION INTRAMUSCULAR; INTRAVENOUS ONCE
Status: COMPLETED | OUTPATIENT
Start: 2022-04-27 | End: 2022-04-27

## 2022-04-27 RX ADMIN — Medication 6 MG: at 22:52

## 2022-04-27 RX ADMIN — LIDOCAINE 1 PATCH: 50 PATCH TOPICAL at 09:32

## 2022-04-27 RX ADMIN — ACETAMINOPHEN 650 MG: 325 TABLET ORAL at 11:25

## 2022-04-27 RX ADMIN — INSULIN LISPRO 1 UNITS: 100 INJECTION, SOLUTION INTRAVENOUS; SUBCUTANEOUS at 22:51

## 2022-04-27 RX ADMIN — PANTOPRAZOLE SODIUM 40 MG: 40 TABLET, DELAYED RELEASE ORAL at 09:42

## 2022-04-27 RX ADMIN — ACETAMINOPHEN 650 MG: 325 TABLET ORAL at 19:57

## 2022-04-27 RX ADMIN — PANTOPRAZOLE SODIUM 40 MG: 40 TABLET, DELAYED RELEASE ORAL at 17:26

## 2022-04-27 RX ADMIN — FUROSEMIDE 40 MG: 10 INJECTION, SOLUTION INTRAMUSCULAR; INTRAVENOUS at 09:32

## 2022-04-27 RX ADMIN — LEVOTHYROXINE SODIUM 100 MCG: 100 TABLET ORAL at 05:09

## 2022-04-27 RX ADMIN — PRAVASTATIN SODIUM 40 MG: 40 TABLET ORAL at 09:32

## 2022-04-27 RX ADMIN — IRON SUCROSE 300 MG: 20 INJECTION, SOLUTION INTRAVENOUS at 10:17

## 2022-04-27 NOTE — ASSESSMENT & PLAN NOTE
· BP reviewed  · Continue amlodipine, irbesartan  · Monitor blood pressure  · Patient transitioning to hospice

## 2022-04-27 NOTE — ASSESSMENT & PLAN NOTE
· Was contacted by RN for increased work of breathing  · Chest x-ray:  Right pleural effusion  · Lasix 40 mg IV x1 with improvement  · Family still on board with transitioning to hospice, however transportation now not available until tomorrow  · Serial assessments    Consider oral Lasix as needed to promote comfort

## 2022-04-27 NOTE — PROGRESS NOTES
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  · Continue Protonix twice daily for 2 weeks and then switch to daily  · Monitor hemoglobin        ABLA (acute blood loss anemia)  Assessment & Plan  · Presented with an initial hemoglobin of 7 2 and a baseline hemoglobin around 13  · Hemoglobin 8 5 on 4/21 prior to discharge, 1 3 gram drop  · Patient had declined outpatient EGD for UGIB during recent hospitalization and this is likely due to a slow ooze from history of bleeding ulcer  · Transfused 2 units pRBC  · See details of EGD   · Transfuse for hemoglobin <7, symptomatic anemia, or active bleeding      Pleural effusion  Assessment & Plan  · Was contacted by RN for increased work of breathing  · Chest x-ray:  Right pleural effusion  · Lasix 40 mg IV x1 with improvement  · Family still on board with transitioning to hospice, however transportation now not available until tomorrow  · Serial assessments    Consider oral Lasix as needed to promote comfort    Elevated troponin  Assessment & Plan  · Troponin elevated on arrival at 320--> peaked at 763, is trending down  · Noted to have been elevated on recent hospitalization as well and was determined to be non-MI elevation due to sepsis  · Likely secondary to demand ischemia from ABLA and hemorrhagic shock  · EKG unremarkable    Cirrhosis (HCC)  Assessment & Plan  · Noted on CT  · AST ALT and T eder unremarkable  · Serial abdominal assessments    Compression fracture of body of thoracic vertebra ([facility] [address] )  Assessment & Plan  · Superior endplate compression fracture of T12 vertebral body EF of unknown chronicity  · Lidoderm patch to back Q12 hr  · Tylenol PRN  · PT/OT      Abnormal CT of the chest  Assessment & Plan  · CT of chest revealed evidence of bilateral interstitial lung disease, also intrathoracic stomach  · Consider pulmonary consult versus outpatient follow-up      Chronic respiratory failure (HCC)  Assessment & Plan  · Currently utilizing 2 L since diagnosis of pneumonia in December  · Per chart review, family has declined workup for etiology  · Monitor oxygen requirements    Hypothyroidism  Assessment & Plan  · Continue levothyroxine    Diabetes mellitus type 2, insulin dependent [facility])  Assessment & Plan  Lab Results   Component Value Date    HGBA1C 5 7 (H) 04/24/2022       Recent Labs     04/26/22  2120 04/27/22  0652 04/27/22  1155 04/27/22  1441   POCGLU 244* 213* 229* 241*       Blood Sugar Average: Last 72 hrs:  (P) 195 125      · Diabetic diet as tolerated  · Discontinue fingerstick blood sugar with sliding scale coverage  · Patient transitioning to hospice    Primary hypertension  Assessment & Plan  · BP reviewed  · Continue amlodipine, irbesartan  · Monitor blood pressure  · Patient transitioning to hospice    VTE Pharmacologic Prophylaxis:   Pharmacologic: Pharmacologic VTE Prophylaxis contraindicated due to GI bleeding  Mechanical VTE Prophylaxis in Place: Yes    Patient Centered Rounds: I have performed bedside rounds with nursing staff today  Discussions with Specialists or Other Care Team Provider:  Attending    Education and Discussions with Family / Patient:  Patient and family at bedside    Time Spent for Care: 30 minutes  More than 50% of total time spent on counseling and coordination of care as described above      Current Length of Stay: 4 day(s)    Current Patient Status: Inpatient   Certification Statement: The patient will continue to require additional inpatient hospital stay due to plan above    Discharge Plan:  Hospice    Code Status: Level 3 - DNAR and DNI      Subjective:   Patient seen and examined  She has some difficulty breathing this morning, but is feeling better now  Objective:     Vitals:   Temp (24hrs), Av 8 °F (36 6 °C), Min:97 °F (36 1 °C), Max:98 8 °F (37 1 °C)    Temp:  [97 °F (36 1 °C)-98 8 °F (37 1 °C)] 98 8 °F (37 1 °C)  HR:  [] 98  Resp:  [18-20] 18  BP: (103-119)/(59-68) 105/60  SpO2:  [96 %-98 %] 97 %  Body mass index is 29 78 kg/m²  Input and Output Summary (last 24 hours): Intake/Output Summary (Last 24 hours) at 2022 1716  Last data filed at 2022 1300  Gross per 24 hour   Intake --   Output 200 ml   Net -200 ml       Physical Exam:     Physical Exam  Vitals and nursing note reviewed  HENT:      Head: Normocephalic and atraumatic  Mouth/Throat:      Pharynx: Oropharynx is clear  Eyes:      Pupils: Pupils are equal, round, and reactive to light  Cardiovascular:      Rate and Rhythm: Normal rate  Pulmonary:      Effort: Pulmonary effort is normal  No respiratory distress  Breath sounds: Normal breath sounds  Abdominal:      General: Bowel sounds are normal       Palpations: Abdomen is soft  Tenderness: There is no abdominal tenderness  Musculoskeletal:      Cervical back: Neck supple  Skin:     General: Skin is warm and dry  Capillary Refill: Capillary refill takes less than 2 seconds  Neurological:      General: No focal deficit present  Mental Status: She is alert         Additional Data:     Labs:    Results from last 7 days   Lab Units 22  0508   WBC Thousand/uL 8 05   HEMOGLOBIN g/dL 7 6*   HEMATOCRIT % 25 8*   PLATELETS Thousands/uL 208   NEUTROS PCT % 73   LYMPHS PCT % 15   MONOS PCT % 9   EOS PCT % 2     Results from last 7 days   Lab Units 22  0441 22  1615 22  0448   SODIUM mmol/L 135   < > 137   POTASSIUM mmol/L 4 3   < > 4 4   CHLORIDE mmol/L 102   < > 105   CO2 mmol/L 28   < > 26   BUN mg/dL 24   < > 23   CREATININE mg/dL 1 07   < > 0 93   ANION GAP mmol/L 5   < > 6   CALCIUM mg/dL 8 7   < > 8 8   ALBUMIN g/dL  --   --  3 2*   TOTAL BILIRUBIN mg/dL  --   --  0 94   ALK PHOS U/L  --   --  37   ALT U/L  --   --  8   AST U/L  --   --  14   GLUCOSE RANDOM mg/dL 173*   < > 143*    < > = values in this interval not displayed  Results from last 7 days   Lab Units 04/23/22  1325   INR  1 16     Results from last 7 days   Lab Units 04/27/22  1441 04/27/22  1155 04/27/22  0652 04/26/22  2120 04/26/22  1438 04/26/22  1129 04/26/22  7859 04/25/22  2105 04/25/22  1615 04/25/22  1056 04/25/22  0729 04/24/22  2059   POC GLUCOSE mg/dl 241* 229* 213* 244* 165* 222* 186* 238* 167* 239* 167* 143*     Results from last 7 days   Lab Units 04/24/22  0458   HEMOGLOBIN A1C % 5 7*     Results from last 7 days   Lab Units 04/24/22  0458 04/23/22  1909 04/23/22  1325   LACTIC ACID mmol/L  --  1 9  --    PROCALCITONIN ng/ml 0 12  --  0 14           * I Have Reviewed All Lab Data Listed Above  * Additional Pertinent Lab Tests Reviewed: [place] [address] Admission Reviewed    Imaging:    Imaging Reports Reviewed Today Include:  Chest x-ray  Imaging Personally Reviewed by Myself Includes:  Chest x-ray    Recent Cultures (last 7 days):     Results from last 7 days   Lab Units 04/23/22  1743 04/23/22  1713   BLOOD CULTURE  No Growth at 72 hrs  No Growth at 72 hrs         Last 24 Hours Medication List:   Current Facility-Administered Medications   Medication Dose Route Frequency Provider Last Rate    acetaminophen  650 mg Oral Q6H PRN KUSH Nobles      insulin lispro  1-5 Units Subcutaneous HS KUSH Nobles      insulin lispro  1-6 Units Subcutaneous TID AC KUSH Nobles      levothyroxine  100 mcg Oral Daily KUSH Nobles      lidocaine  1 patch Topical Daily KUSH Nobles      melatonin  6 mg Oral HS [employer] KUSH Wu      ondansetron  4 mg Intravenous Q8H PRN KUSH Nobles      pantoprazole  40 mg Oral BID AC KUSH Nobles      pravastatin  40 mg Oral Daily KUSH Feldman          Today, Patient Was Seen By: KUSH Arredondo    ** Please Note: Dictation voice to text software may have been used in the creation of this document   **

## 2022-04-27 NOTE — ASSESSMENT & PLAN NOTE
Lab Results   Component Value Date    HGBA1C 5 7 (H) 04/24/2022       Recent Labs     04/26/22  2120 04/27/22  0652 04/27/22  1155 04/27/22  1441   POCGLU 244* 213* 229* 241*       Blood Sugar Average: Last 72 hrs:  (P) 195 125      · Diabetic diet as tolerated  · Discontinue fingerstick blood sugar with sliding scale coverage  · Patient transitioning to hospice

## 2022-04-27 NOTE — ASSESSMENT & PLAN NOTE
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

## 2022-04-27 NOTE — ASSESSMENT & PLAN NOTE
· Currently utilizing 2 L since diagnosis of pneumonia in December  · Per chart review, family has declined workup for etiology  · Monitor oxygen requirements

## 2022-04-27 NOTE — CASE MANAGEMENT
Case Management Discharge Planning Note    Patient name Nico Lainez  Location /-01 MRN [id number]  : 1929 Date 2022       Current Admission Date: 2022  Current Admission Diagnosis:Upper GI bleed   Patient Active Problem List    Diagnosis Date Noted    Elevated troponin 2022    Chronic respiratory failure ([address] ) 2022    SIRS (systemic inflammatory response syndrome) ([facility] ) 2022    Abnormal CT of the chest 2022    Compression fracture of body of thoracic vertebra ([facility] [address] ) 2022    Cirrhosis ([facility] [address] ) 2022    Severe sepsis ([facility] ) 2022    Right upper lobe pneumonia 2022    Upper GI bleed 2022    ABLA (acute blood loss anemia) 2022    Acute cystitis without hematuria 2022    Hypoxia 2022    Elevated troponin level not due myocardial infarction 2022    Lactic acidosis 2022    Acute respiratory failure with hypoxia ([facility] [address] ) 2021    Hypomagnesemia 2021    Microalbuminuria 2020    Overweight (BMI 25 0-29 9) 2019    Other hyperlipidemia 2018    Diabetes mellitus type 2, insulin dependent ([facility] [address] )     Hypothyroidism     Basal cell carcinoma     Primary hypertension 2018      LOS (days): 4  Geometric Mean LOS (GMLOS) (days): 4 40  Days to GMLOS:0 6     OBJECTIVE:  Risk of Unplanned Readmission Score: 19     Current admission status: Inpatient   Preferred Pharmacy:   [facility] , [address] [id number]  Phone: [phone] Fax: [phone] [address] [place] [address]   Elissa Melena, [address]  [address] [place] [id number]  Phone: [phone] Fax: [phone]    Primary Care Provider: Josh Mcdermott MD    Primary Insurance: MEDICARE  Secondary Insurance: [address] [place] [address] DETAILS:  AS per SLIM the pt will not be able to be DC back to [facility] today d/t increased SOB  Notified EDD Vidales from [address] at [employer]  and cancelled the [employer]

## 2022-04-27 NOTE — ASSESSMENT & PLAN NOTE
· CT of chest revealed evidence of bilateral interstitial lung disease, also intrathoracic stomach  · Consider pulmonary consult versus outpatient follow-up

## 2022-04-28 VITALS
BODY MASS INDEX: 29.73 KG/M2 | TEMPERATURE: 98.5 F | HEIGHT: 64 IN | DIASTOLIC BLOOD PRESSURE: 74 MMHG | WEIGHT: 174.16 LBS | SYSTOLIC BLOOD PRESSURE: 127 MMHG | RESPIRATION RATE: 17 BRPM | OXYGEN SATURATION: 97 % | HEART RATE: 98 BPM

## 2022-04-28 LAB
BASOPHILS # BLD AUTO: 0.04 THOUSANDS/ΜL (ref 0–0.1)
BASOPHILS NFR BLD AUTO: 1 % (ref 0–1)
EOSINOPHIL # BLD AUTO: 0.13 THOUSAND/ΜL (ref 0–0.61)
EOSINOPHIL NFR BLD AUTO: 2 % (ref 0–6)
ERYTHROCYTE [DISTWIDTH] IN BLOOD BY AUTOMATED COUNT: 18.7 % (ref 11.6–15.1)
GLUCOSE SERPL-MCNC: 230 MG/DL (ref 65–140)
GLUCOSE SERPL-MCNC: 265 MG/DL (ref 65–140)
HCT VFR BLD AUTO: 25.9 % (ref 34.8–46.1)
HGB BLD-MCNC: 7.7 G/DL (ref 11.5–15.4)
IMM GRANULOCYTES # BLD AUTO: 0.11 THOUSAND/UL (ref 0–0.2)
IMM GRANULOCYTES NFR BLD AUTO: 1 % (ref 0–2)
LYMPHOCYTES # BLD AUTO: 0.94 THOUSANDS/ΜL (ref 0.6–4.47)
LYMPHOCYTES NFR BLD AUTO: 12 % (ref 14–44)
MCH RBC QN AUTO: 30.4 PG (ref 26.8–34.3)
MCHC RBC AUTO-ENTMCNC: 29.7 G/DL (ref 31.4–37.4)
MCV RBC AUTO: 102 FL (ref 82–98)
MONOCYTES # BLD AUTO: 0.73 THOUSAND/ΜL (ref 0.17–1.22)
MONOCYTES NFR BLD AUTO: 9 % (ref 4–12)
NEUTROPHILS # BLD AUTO: 5.87 THOUSANDS/ΜL (ref 1.85–7.62)
NEUTS SEG NFR BLD AUTO: 75 % (ref 43–75)
NRBC BLD AUTO-RTO: 1 /100 WBCS
PLATELET # BLD AUTO: 220 THOUSANDS/UL (ref 149–390)
PMV BLD AUTO: 10.3 FL (ref 8.9–12.7)
RBC # BLD AUTO: 2.53 MILLION/UL (ref 3.81–5.12)
WBC # BLD AUTO: 7.82 THOUSAND/UL (ref 4.31–10.16)

## 2022-04-28 PROCEDURE — 99239 HOSP IP/OBS DSCHRG MGMT >30: CPT | Performed by: NURSE PRACTITIONER

## 2022-04-28 PROCEDURE — 82948 REAGENT STRIP/BLOOD GLUCOSE: CPT

## 2022-04-28 PROCEDURE — 85025 COMPLETE CBC W/AUTO DIFF WBC: CPT | Performed by: NURSE PRACTITIONER

## 2022-04-28 RX ORDER — MORPHINE SULFATE 100 MG/5ML
5 SOLUTION ORAL
Qty: 30 ML | Refills: 0 | Status: SHIPPED | OUTPATIENT
Start: 2022-04-28 | End: 2022-05-08

## 2022-04-28 RX ORDER — LORAZEPAM 0.5 MG/1
0.5 TABLET ORAL EVERY 6 HOURS PRN
Qty: 28 TABLET | Refills: 0 | Status: SHIPPED | OUTPATIENT
Start: 2022-04-28 | End: 2022-05-05

## 2022-04-28 RX ORDER — LIDOCAINE 50 MG/G
1 PATCH TOPICAL DAILY
Qty: 7 PATCH | Refills: 0 | Status: SHIPPED | OUTPATIENT
Start: 2022-04-29 | End: 2022-07-06 | Stop reason: ALTCHOICE

## 2022-04-28 RX ORDER — LANOLIN ALCOHOL/MO/W.PET/CERES
6 CREAM (GRAM) TOPICAL
Qty: 60 TABLET | Refills: 0 | Status: SHIPPED | OUTPATIENT
Start: 2022-04-28 | End: 2022-07-06 | Stop reason: ALTCHOICE

## 2022-04-28 RX ADMIN — PANTOPRAZOLE SODIUM 40 MG: 40 TABLET, DELAYED RELEASE ORAL at 08:34

## 2022-04-28 RX ADMIN — LEVOTHYROXINE SODIUM 100 MCG: 100 TABLET ORAL at 05:52

## 2022-04-28 RX ADMIN — PRAVASTATIN SODIUM 40 MG: 40 TABLET ORAL at 08:34

## 2022-04-28 RX ADMIN — ACETAMINOPHEN 650 MG: 325 TABLET ORAL at 13:54

## 2022-04-28 RX ADMIN — LIDOCAINE 1 PATCH: 50 PATCH TOPICAL at 08:37

## 2022-04-28 NOTE — PLAN OF CARE
Problem: PAIN - ADULT  Goal: Verbalizes/displays adequate comfort level or baseline comfort level  Description: Interventions:  - Encourage patient to monitor pain and request assistance  - Assess pain using appropriate pain scale  - Administer analgesics based on type and severity of pain and evaluate response  - Implement non-pharmacological measures as appropriate and evaluate response  - Consider cultural and social influences on pain and pain management  - Notify physician/advanced practitioner if interventions unsuccessful or patient reports new pain  Outcome: Progressing     Problem: INFECTION - ADULT  Goal: Absence or prevention of progression during hospitalization  Description: INTERVENTIONS:  - Assess and monitor for signs and symptoms of infection  - Monitor lab/diagnostic results  - Monitor all insertion sites, i e  indwelling lines, tubes, and drains  - Monitor endotracheal if appropriate and nasal secretions for changes in amount and color  - [place] appropriate cooling/warming therapies per order  - Administer medications as ordered  - Instruct and encourage patient and family to use good hand hygiene technique  Outcome: Progressing  Goal: Absence of fever/infection during neutropenic period  Description: INTERVENTIONS:  - Monitor WBC    Outcome: Progressing     Problem: SAFETY ADULT  Goal: Patient will remain free of falls  Description: INTERVENTIONS:  - Educate patient/family on patient safety including physical limitations  - Instruct patient to call for assistance with activity   - Consult OT/PT to assist with strengthening/mobility   - Keep Call bell within reach  - Keep bed low and locked with side rails adjusted as appropriate  - Keep care items and personal belongings within reach  - Initiate and maintain comfort rounds  - Make Fall Risk Sign visible to staff  - Offer Toileting in advance of need  - Initiate/Maintain bed alarm  - Obtain necessary fall risk management equipment:   - Apply yellow socks and bracelet for high fall risk patients  - Consider moving patient to room near nurses station  Outcome: Progressing  Goal: Maintain or return to baseline ADL function  Description: INTERVENTIONS:  -  Assess patient's ability to carry out ADLs; assess patient's baseline for ADL function and identify physical deficits which impact ability to perform ADLs (bathing, care of mouth/teeth, toileting, grooming, dressing, etc )  - Assess/evaluate cause of self-care deficits   - Assess range of motion  - Assess patient's mobility; develop plan if impaired  - Assess patient's need for assistive devices and provide as appropriate  - Encourage maximum independence but intervene and supervise when necessary  - Involve family in performance of ADLs  - Assess for home care needs following discharge   - Consider OT consult to assist with ADL evaluation and planning for discharge  - Provide patient education as appropriate  Outcome: Progressing  Goal: Maintains/Returns to pre admission functional level  Description: INTERVENTIONS:  - Perform BMAT or MOVE assessment daily    - Set and communicate daily mobility goal to care team and patient/family/caregiver  - Collaborate with rehabilitation services on mobility goals if consulted  - Reposition patient every two hours    - Out of bed to chair   - Out of bed for meals  - Out of bed for toileting  - Record patient progress and toleration of activity level   Outcome: Progressing     Problem: DISCHARGE PLANNING  Goal: Discharge to home or other facility with appropriate resources  Description: INTERVENTIONS:  - Identify barriers to discharge w/patient and caregiver  - Arrange for needed discharge resources and transportation as appropriate  - Identify discharge learning needs (meds, wound care, etc )  - Refer to Case Management Department for coordinating discharge planning if the patient needs post-hospital services based on physician/advanced practitioner order or complex needs related to functional status, cognitive ability, or social support system  Outcome: Progressing     Problem: Knowledge Deficit  Goal: Patient/family/caregiver demonstrates understanding of disease process, treatment plan, medications, and discharge instructions  Description: Complete learning assessment and assess knowledge base  Interventions:  - Provide teaching at level of understanding  - Provide teaching via preferred learning methods  Outcome: Progressing     Problem: MOBILITY - ADULT  Goal: Maintain or return to baseline ADL function  Description: INTERVENTIONS:  -  Assess patient's ability to carry out ADLs; assess patient's baseline for ADL function and identify physical deficits which impact ability to perform ADLs (bathing, care of mouth/teeth, toileting, grooming, dressing, etc )  - Assess/evaluate cause of self-care deficits   - Assess range of motion  - Assess patient's mobility; develop plan if impaired  - Assess patient's need for assistive devices and provide as appropriate  - Encourage maximum independence but intervene and supervise when necessary  - Involve family in performance of ADLs  - Assess for home care needs following discharge   - Consider OT consult to assist with ADL evaluation and planning for discharge  - Provide patient education as appropriate  Outcome: Progressing  Goal: Maintains/Returns to pre admission functional level  Description: INTERVENTIONS:  - Perform BMAT or MOVE assessment daily    - Set and communicate daily mobility goal to care team and patient/family/caregiver  - Collaborate with rehabilitation services on mobility goals if consulted  - Reposition patient every two hours    - Out of bed to chair   - Out of bed for meals  - Out of bed for toileting  - Record patient progress and toleration of activity level   Outcome: Progressing

## 2022-04-28 NOTE — NURSING NOTE
Patient stable and medically cleared for discharge  Order confirmed  Giselle IV removed  Leodan disconnected  Patient belongings returned  Discharge paperwork provided to transport team  Patient transferred to [facility] and exited the unit

## 2022-04-28 NOTE — ASSESSMENT & PLAN NOTE
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

## 2022-04-28 NOTE — ASSESSMENT & PLAN NOTE
Lab Results   Component Value Date    HGBA1C 5 7 (H) 04/24/2022       Recent Labs     04/27/22  1155 04/27/22  1441 04/27/22 2020 04/28/22  0555   POCGLU 229* 241* 210* 230*       Blood Sugar Average: Last 72 hrs:  (P) 201 [id number]      · Diabetic diet as tolerated  · Discontinue fingerstick blood sugar with sliding scale coverage  · Patient transitioning to hospice

## 2022-04-28 NOTE — CASE MANAGEMENT
Case Management Discharge Planning Note    Patient name Florence Milner /-01 MRN [id number]  : 1929 Date 2022       Current Admission Date: 2022  Current Admission Diagnosis:Upper GI bleed   Patient Active Problem List    Diagnosis Date Noted    Pleural effusion 2022    Elevated troponin 2022    Chronic respiratory failure ([facility] ) 2022    SIRS (systemic inflammatory response syndrome) ([facility] ) 2022    Abnormal CT of the chest 2022    Compression fracture of body of thoracic vertebra ([facility] ) 2022    Cirrhosis ([facility] ) 2022    Severe sepsis ([facility] ) 2022    Right upper lobe pneumonia 2022    Upper GI bleed 2022    ABLA (acute blood loss anemia) 2022    Acute cystitis without hematuria 2022    Hypoxia 2022    Elevated troponin level not due myocardial infarction 2022    Lactic acidosis 2022    Acute respiratory failure with hypoxia ([facility] ) 2021    Hypomagnesemia 2021    Microalbuminuria 2020    Overweight (BMI 25 0-29 9) 2019    Other hyperlipidemia 2018    Diabetes mellitus type 2, insulin dependent ([facility] )     Hypothyroidism     Basal cell carcinoma     Primary hypertension 2018      LOS (days): 5  Geometric Mean LOS (GMLOS) (days): 4 40  Days to GMLOS:-0 4     OBJECTIVE:  Risk of Unplanned Readmission Score: 19     Current admission status: Inpatient   Preferred Pharmacy:   [facility], [address] [place] [address]  Phone: [phone] Fax: [phone] [address] [place] [address], [address]  [facility] [address]  Phone: [phone] Fax: [phone] - [facility] [address]   [facility] [address]  Phone: [phone] Fax: [phone]    Primary Care Provider: Sherman Wesley Angel Perez MD    Primary Insurance: MEDICARE  Secondary Insurance: Maegan Feng DETAILS:Pt has been evaluated by [facility] and is stable to be discharged back to [facility]  Set up transport with [facility] for 1430  Notified Deepa TOMLIN from [address] [place] [address] at [facility] and [place] of transport time

## 2022-04-28 NOTE — ASSESSMENT & PLAN NOTE
· Was contacted by RN for increased work of breathing  · Chest x-ray:  Right pleural effusion  · Lasix 40 mg IV x1 with improvement  · Family still on board with transitioning to hospice  · Serial assessments    Consider oral Lasix as needed to promote comfort

## 2022-04-28 NOTE — DISCHARGE SUMMARY
Veronica 45  Discharge- 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  · Continue Protonix twice daily for 2 weeks and then switch to daily  · Monitor hemoglobin        ABLA (acute blood loss anemia)  Assessment & Plan  · Presented with an initial hemoglobin of 7 2 and a baseline hemoglobin around 13  · Hemoglobin 8 5 on 4/21 prior to discharge, 1 3 gram drop  · Patient had declined outpatient EGD for UGIB during recent hospitalization and this is likely due to a slow ooze from history of bleeding ulcer  · Transfused 2 units pRBC  · See details of EGD   · Transfuse for hemoglobin <7, symptomatic anemia, or active bleeding      Pleural effusion  Assessment & Plan  · Was contacted by RN for increased work of breathing  · Chest x-ray:  Right pleural effusion  · Lasix 40 mg IV x1 with improvement  · Family still on board with transitioning to hospice  · Serial assessments    Consider oral Lasix as needed to promote comfort    Elevated troponin  Assessment & Plan  · Troponin elevated on arrival at 320--> peaked at 763, is trending down  · Noted to have been elevated on recent hospitalization as well and was determined to be non-MI elevation due to sepsis  · Likely secondary to demand ischemia from ABLA and hemorrhagic shock  · EKG unremarkable    Cirrhosis (HCC)  Assessment & Plan  · Noted on CT  · AST ALT and T eder unremarkable  · Serial abdominal assessments    Compression fracture of body of thoracic vertebra (HCC)  Assessment & Plan  · Superior endplate compression fracture of T12 vertebral body EF of unknown chronicity  · Lidoderm patch to back Q12 hr  · Tylenol PRN  · PT/OT      Abnormal CT of the chest  Assessment & Plan  · CT of chest revealed evidence of bilateral interstitial lung disease, also intrathoracic stomach  · Consider pulmonary consult versus outpatient follow-up      Chronic respiratory failure (HCC)  Assessment & Plan  · Currently utilizing 2 L since diagnosis of pneumonia in December  · Per chart review, family has declined workup for etiology  · Monitor oxygen requirements    Hypothyroidism  Assessment & Plan  · Continue levothyroxine    Diabetes mellitus type 2, insulin dependent [facility])  Assessment & Plan  Lab Results   Component Value Date    HGBA1C 5 7 (H) 04/24/2022       Recent Labs     04/27/22  1155 04/27/22  1441 04/27/22 2020 04/28/22  0555   POCGLU 229* 241* 210* 230*       Blood Sugar Average: Last 72 hrs:  (P) 981 [id number]      · Diabetic diet as tolerated  · Discontinue fingerstick blood sugar with sliding scale coverage  · Patient transitioning to hospice      Discharging Physician / Practitioner: [place] [address] Dewayne Garnett  PCP: German Guerrero MD  Admission Date:   Admission Orders (From admission, onward)     Ordered        04/23/22 1500  Inpatient Admission  Once                      Discharge Date: 04/28/22    Medical Problems             Resolved Problems  Date Reviewed: 4/28/2022          Resolved    Shock ([address] ) 4/24/2022     Resolved by  KUSH Farmer    Hyponatremia 4/24/2022     Resolved by  KUSH Farmer    Encephalopathy 4/24/2022     Resolved by  Juanjose Carter, [address] [facility] Stay:  · Gastroenterology, PT, OT, Case Management, hospice    Procedures Performed:   · EGD    Significant Findings / Test Results:   · Two large gastric AVMs    Incidental Findings:   · None    Test Results Pending at Discharge (will require follow up):    · None     Outpatient Tests Requested:  · None    Complications:  None    Reason for Admission:  Upper GI bleed    Hospital Course:     Rosalind Zeng is a 80 y o  female patient who originally presented to the hospital on 4/23/2022 due to on matter status and difficulty walking  She presented very pale and ill-appearing with low blood pressure  She developed GI bleed during a recent hospitalization 4/18/2022-4/21/2022, blood per chart review was deemed unstable for inpatient EGD  She also had 2 syncopal episodes with a dark loose BM, raising suspicion for continued GI bleeding  She was admitted to the ICU, a blood pressure was supported with norepinephrine  She received 2 units of PRBCs  She had an EGD on 04/24/2022 which revealed 2 large gastric AVMs treated with APC  She was downgraded to the medical level of service, with discussions opened regarding hospice  Please see above list of diagnoses and related plan for additional information  Condition at Discharge: stable     Discharge Day Visit / Exam:     Subjective:  Patient seen and examined  No complaints offered  Vitals: Blood Pressure: 127/74 (04/28/22 0752)  Pulse: 98 (04/28/22 0752)  Temperature: 98 5 °F (36 9 °C) (04/28/22 0752)  Temp Source: Oral (04/27/22 2219)  Respirations: 17 (04/28/22 0752)  Height: 5' 4" (162 6 cm) (04/23/22 1614)  Weight - Scale: 79 kg (174 lb 2 6 oz) (04/28/22 0559)  SpO2: 97 % (04/28/22 0752)  Exam:   Physical Exam  Vitals and nursing note reviewed  Constitutional:       Appearance: She is ill-appearing  HENT:      Head: Normocephalic and atraumatic  Mouth/Throat:      Pharynx: Oropharynx is clear  Eyes:      Pupils: Pupils are equal, round, and reactive to light  Cardiovascular:      Rate and Rhythm: Normal rate  Pulmonary:      Effort: Pulmonary effort is normal  No respiratory distress  Breath sounds: Rales present        Comments: Faint rales at right base  Abdominal:      General: Bowel sounds are normal  Palpations: Abdomen is soft  Tenderness: There is no abdominal tenderness  Musculoskeletal:      Cervical back: Neck supple  Skin:     General: Skin is warm and dry  Capillary Refill: Capillary refill takes less than 2 seconds  Coloration: Skin is pale  Neurological:      General: No focal deficit present  Mental Status: She is alert  Discussion with Family:  Daughters at bedside    Discharge instructions/Information to patient and family:   See after visit summary for information provided to patient and family  Provisions for Follow-Up Care:  See after visit summary for information related to follow-up care and any pertinent home health orders  Disposition:     Home      Planned Readmission: No     Discharge Statement:  I spent 30 minutes discharging the patient  This time was spent on the day of discharge  I had direct contact with the patient on the day of discharge  Greater than 50% of the total time was spent examining patient, answering all patient questions, arranging and discussing plan of care with patient as well as directly providing post-discharge instructions  Additional time then spent on discharge activities  Discharge Medications:  See after visit summary for reconciled discharge medications provided to patient and family        ** Please Note: This note has been constructed using a voice recognition system **

## 2022-04-29 LAB
BACTERIA BLD CULT: NORMAL
BACTERIA BLD CULT: NORMAL

## 2022-04-30 ENCOUNTER — HOSPICE ADMISSION (OUTPATIENT)
Dept: HOME HOSPICE | Facility: HOSPICE | Age: 87
End: 2022-04-30
Payer: MEDICARE

## 2022-05-01 PROCEDURE — T2042 HOSPICE ROUTINE HOME CARE: HCPCS

## 2022-05-02 ENCOUNTER — HOME CARE VISIT (OUTPATIENT)
Dept: HOME HOSPICE | Facility: HOSPICE | Age: 87
End: 2022-05-02
Payer: MEDICARE

## 2022-05-02 PROCEDURE — T2042 HOSPICE ROUTINE HOME CARE: HCPCS

## 2022-05-02 PROCEDURE — 10330057 MEDICATION, GENERAL

## 2022-05-02 NOTE — CASE COMMUNICATION
placed TC and spoke to Martín Rodríguezr of pt and introduced self, spiritual care as a core component of Hospice services and to schedule a home visit   services are accepted  No additional information provided  Date of TC 5/2/2022  Pattern: 1-2X a month  Plan: Visit week of 5/1/22, (preferred 5-3  Assess spiritual strengths, concerns, issues, and address  Update Harmeet SOLIMAN

## 2022-05-03 ENCOUNTER — HOME CARE VISIT (OUTPATIENT)
Dept: HOME HEALTH SERVICES | Facility: HOME HEALTHCARE | Age: 87
End: 2022-05-03
Payer: MEDICARE

## 2022-05-03 VITALS
DIASTOLIC BLOOD PRESSURE: 66 MMHG | SYSTOLIC BLOOD PRESSURE: 90 MMHG | RESPIRATION RATE: 20 BRPM | TEMPERATURE: 97.4 F | HEART RATE: 88 BPM

## 2022-05-03 PROCEDURE — G0299 HHS/HOSPICE OF RN EA 15 MIN: HCPCS

## 2022-05-03 PROCEDURE — T2042 HOSPICE ROUTINE HOME CARE: HCPCS

## 2022-05-03 PROCEDURE — G0156 HHCP-SVS OF AIDE,EA 15 MIN: HCPCS

## 2022-05-04 PROCEDURE — T2042 HOSPICE ROUTINE HOME CARE: HCPCS

## 2022-05-05 ENCOUNTER — HOME CARE VISIT (OUTPATIENT)
Dept: HOME HEALTH SERVICES | Facility: HOME HEALTHCARE | Age: 87
End: 2022-05-05
Payer: MEDICARE

## 2022-05-05 VITALS
HEART RATE: 84 BPM | RESPIRATION RATE: 20 BRPM | TEMPERATURE: 97.8 F | SYSTOLIC BLOOD PRESSURE: 88 MMHG | DIASTOLIC BLOOD PRESSURE: 56 MMHG

## 2022-05-05 PROCEDURE — G0156 HHCP-SVS OF AIDE,EA 15 MIN: HCPCS

## 2022-05-05 PROCEDURE — G0299 HHS/HOSPICE OF RN EA 15 MIN: HCPCS

## 2022-05-05 PROCEDURE — T2042 HOSPICE ROUTINE HOME CARE: HCPCS

## 2022-05-06 ENCOUNTER — HOME CARE VISIT (OUTPATIENT)
Dept: HOME HOSPICE | Facility: HOSPICE | Age: 87
End: 2022-05-06
Payer: MEDICARE

## 2022-05-06 ENCOUNTER — HOME CARE VISIT (OUTPATIENT)
Dept: HOME HEALTH SERVICES | Facility: HOME HEALTHCARE | Age: 87
End: 2022-05-06
Payer: MEDICARE

## 2022-05-06 VITALS
DIASTOLIC BLOOD PRESSURE: 40 MMHG | TEMPERATURE: 97.6 F | SYSTOLIC BLOOD PRESSURE: 80 MMHG | RESPIRATION RATE: 18 BRPM | HEART RATE: 86 BPM

## 2022-05-06 PROCEDURE — T2042 HOSPICE ROUTINE HOME CARE: HCPCS

## 2022-05-06 PROCEDURE — G0299 HHS/HOSPICE OF RN EA 15 MIN: HCPCS

## 2022-05-06 PROCEDURE — 10330057 MEDICATION, GENERAL

## 2022-05-06 PROCEDURE — G0156 HHCP-SVS OF AIDE,EA 15 MIN: HCPCS

## 2022-05-07 PROCEDURE — T2042 HOSPICE ROUTINE HOME CARE: HCPCS

## 2022-05-08 PROCEDURE — T2042 HOSPICE ROUTINE HOME CARE: HCPCS

## 2022-05-09 ENCOUNTER — HOME CARE VISIT (OUTPATIENT)
Dept: HOME HEALTH SERVICES | Facility: HOME HEALTHCARE | Age: 87
End: 2022-05-09
Payer: MEDICARE

## 2022-05-09 PROCEDURE — G0156 HHCP-SVS OF AIDE,EA 15 MIN: HCPCS

## 2022-05-09 PROCEDURE — T2042 HOSPICE ROUTINE HOME CARE: HCPCS

## 2022-05-10 ENCOUNTER — HOME CARE VISIT (OUTPATIENT)
Dept: HOME HEALTH SERVICES | Facility: HOME HEALTHCARE | Age: 87
End: 2022-05-10
Payer: MEDICARE

## 2022-05-10 VITALS
SYSTOLIC BLOOD PRESSURE: 96 MMHG | HEART RATE: 64 BPM | DIASTOLIC BLOOD PRESSURE: 58 MMHG | TEMPERATURE: 97.9 F | RESPIRATION RATE: 22 BRPM

## 2022-05-10 DIAGNOSIS — Z51.5 HOSPICE CARE: Primary | ICD-10-CM

## 2022-05-10 PROCEDURE — T2042 HOSPICE ROUTINE HOME CARE: HCPCS

## 2022-05-10 PROCEDURE — 10330057 MEDICATION, GENERAL

## 2022-05-10 PROCEDURE — G0299 HHS/HOSPICE OF RN EA 15 MIN: HCPCS

## 2022-05-10 RX ORDER — MORPHINE SULFATE 100 MG/5ML
5 SOLUTION, CONCENTRATE ORAL
Qty: 30 ML | Refills: 0 | Status: SHIPPED | OUTPATIENT
Start: 2022-05-10

## 2022-05-11 ENCOUNTER — HOME CARE VISIT (OUTPATIENT)
Dept: HOME HEALTH SERVICES | Facility: HOME HEALTHCARE | Age: 87
End: 2022-05-11
Payer: MEDICARE

## 2022-05-11 ENCOUNTER — HOME CARE VISIT (OUTPATIENT)
Dept: HOME HOSPICE | Facility: HOSPICE | Age: 87
End: 2022-05-11
Payer: MEDICARE

## 2022-05-11 PROCEDURE — G0156 HHCP-SVS OF AIDE,EA 15 MIN: HCPCS

## 2022-05-11 PROCEDURE — 10330057 MEDICATION, GENERAL

## 2022-05-11 PROCEDURE — T2042 HOSPICE ROUTINE HOME CARE: HCPCS

## 2022-05-11 PROCEDURE — G0155 HHCP-SVS OF CSW,EA 15 MIN: HCPCS

## 2022-05-12 PROCEDURE — T2042 HOSPICE ROUTINE HOME CARE: HCPCS

## 2022-05-13 ENCOUNTER — HOME CARE VISIT (OUTPATIENT)
Dept: HOME HEALTH SERVICES | Facility: HOME HEALTHCARE | Age: 87
End: 2022-05-13
Payer: MEDICARE

## 2022-05-13 VITALS
RESPIRATION RATE: 20 BRPM | TEMPERATURE: 97.4 F | HEART RATE: 82 BPM | DIASTOLIC BLOOD PRESSURE: 40 MMHG | SYSTOLIC BLOOD PRESSURE: 92 MMHG

## 2022-05-13 PROCEDURE — T2042 HOSPICE ROUTINE HOME CARE: HCPCS

## 2022-05-13 PROCEDURE — G0299 HHS/HOSPICE OF RN EA 15 MIN: HCPCS

## 2022-05-14 PROCEDURE — T2042 HOSPICE ROUTINE HOME CARE: HCPCS

## 2022-05-14 PROCEDURE — 10330057 MEDICATION, GENERAL

## 2022-05-15 PROCEDURE — T2042 HOSPICE ROUTINE HOME CARE: HCPCS

## 2022-05-16 ENCOUNTER — HOME CARE VISIT (OUTPATIENT)
Dept: HOME HEALTH SERVICES | Facility: HOME HEALTHCARE | Age: 87
End: 2022-05-16
Payer: MEDICARE

## 2022-05-16 PROCEDURE — T2042 HOSPICE ROUTINE HOME CARE: HCPCS

## 2022-05-16 PROCEDURE — G0156 HHCP-SVS OF AIDE,EA 15 MIN: HCPCS

## 2022-05-17 ENCOUNTER — HOME CARE VISIT (OUTPATIENT)
Dept: HOME HOSPICE | Facility: HOSPICE | Age: 87
End: 2022-05-17
Payer: MEDICARE

## 2022-05-17 ENCOUNTER — HOME CARE VISIT (OUTPATIENT)
Dept: HOME HEALTH SERVICES | Facility: HOME HEALTHCARE | Age: 87
End: 2022-05-17
Payer: MEDICARE

## 2022-05-17 VITALS
DIASTOLIC BLOOD PRESSURE: 58 MMHG | SYSTOLIC BLOOD PRESSURE: 110 MMHG | TEMPERATURE: 97.9 F | HEART RATE: 70 BPM | RESPIRATION RATE: 20 BRPM

## 2022-05-17 PROCEDURE — G0299 HHS/HOSPICE OF RN EA 15 MIN: HCPCS

## 2022-05-17 PROCEDURE — T2042 HOSPICE ROUTINE HOME CARE: HCPCS

## 2022-05-17 PROCEDURE — 10330064 BRIEF, WINGS CHOICE+ QUILTED LG (18/BG 4

## 2022-05-18 ENCOUNTER — HOME CARE VISIT (OUTPATIENT)
Dept: HOME HEALTH SERVICES | Facility: HOME HEALTHCARE | Age: 87
End: 2022-05-18
Payer: MEDICARE

## 2022-05-18 PROCEDURE — G0156 HHCP-SVS OF AIDE,EA 15 MIN: HCPCS

## 2022-05-18 PROCEDURE — T2042 HOSPICE ROUTINE HOME CARE: HCPCS

## 2022-05-19 ENCOUNTER — HOME CARE VISIT (OUTPATIENT)
Dept: HOME HOSPICE | Facility: HOSPICE | Age: 87
End: 2022-05-19
Payer: MEDICARE

## 2022-05-19 PROCEDURE — T2042 HOSPICE ROUTINE HOME CARE: HCPCS

## 2022-05-20 ENCOUNTER — HOME CARE VISIT (OUTPATIENT)
Dept: HOME HEALTH SERVICES | Facility: HOME HEALTHCARE | Age: 87
End: 2022-05-20
Payer: MEDICARE

## 2022-05-20 VITALS
SYSTOLIC BLOOD PRESSURE: 100 MMHG | TEMPERATURE: 97.5 F | DIASTOLIC BLOOD PRESSURE: 56 MMHG | RESPIRATION RATE: 20 BRPM | HEART RATE: 92 BPM

## 2022-05-20 PROCEDURE — G0299 HHS/HOSPICE OF RN EA 15 MIN: HCPCS

## 2022-05-20 PROCEDURE — T2042 HOSPICE ROUTINE HOME CARE: HCPCS

## 2022-05-21 PROCEDURE — T2042 HOSPICE ROUTINE HOME CARE: HCPCS

## 2022-05-22 PROCEDURE — T2042 HOSPICE ROUTINE HOME CARE: HCPCS

## 2022-05-23 ENCOUNTER — HOME CARE VISIT (OUTPATIENT)
Dept: HOME HEALTH SERVICES | Facility: HOME HEALTHCARE | Age: 87
End: 2022-05-23
Payer: MEDICARE

## 2022-05-23 ENCOUNTER — HOME CARE VISIT (OUTPATIENT)
Dept: HOME HOSPICE | Facility: HOSPICE | Age: 87
End: 2022-05-23
Payer: MEDICARE

## 2022-05-23 PROCEDURE — G0156 HHCP-SVS OF AIDE,EA 15 MIN: HCPCS

## 2022-05-23 PROCEDURE — T2042 HOSPICE ROUTINE HOME CARE: HCPCS

## 2022-05-23 PROCEDURE — 10330057 MEDICATION, GENERAL

## 2022-05-23 PROCEDURE — G0155 HHCP-SVS OF CSW,EA 15 MIN: HCPCS

## 2022-05-24 ENCOUNTER — HOME CARE VISIT (OUTPATIENT)
Dept: HOME HOSPICE | Facility: HOSPICE | Age: 87
End: 2022-05-24
Payer: MEDICARE

## 2022-05-24 ENCOUNTER — HOME CARE VISIT (OUTPATIENT)
Dept: HOME HEALTH SERVICES | Facility: HOME HEALTHCARE | Age: 87
End: 2022-05-24
Payer: MEDICARE

## 2022-05-24 VITALS
SYSTOLIC BLOOD PRESSURE: 102 MMHG | WEIGHT: 167.2 LBS | DIASTOLIC BLOOD PRESSURE: 58 MMHG | TEMPERATURE: 97.6 F | RESPIRATION RATE: 20 BRPM | HEART RATE: 80 BPM | BODY MASS INDEX: 28.7 KG/M2

## 2022-05-24 PROCEDURE — G0299 HHS/HOSPICE OF RN EA 15 MIN: HCPCS

## 2022-05-24 PROCEDURE — T2042 HOSPICE ROUTINE HOME CARE: HCPCS

## 2022-05-25 ENCOUNTER — HOME CARE VISIT (OUTPATIENT)
Dept: HOME HEALTH SERVICES | Facility: HOME HEALTHCARE | Age: 87
End: 2022-05-25
Payer: MEDICARE

## 2022-05-25 PROCEDURE — T2042 HOSPICE ROUTINE HOME CARE: HCPCS

## 2022-05-25 PROCEDURE — G0156 HHCP-SVS OF AIDE,EA 15 MIN: HCPCS

## 2022-05-26 PROCEDURE — T2042 HOSPICE ROUTINE HOME CARE: HCPCS

## 2022-05-26 PROCEDURE — 10330057 MEDICATION, GENERAL

## 2022-05-27 ENCOUNTER — HOME CARE VISIT (OUTPATIENT)
Dept: HOME HEALTH SERVICES | Facility: HOME HEALTHCARE | Age: 87
End: 2022-05-27
Payer: MEDICARE

## 2022-05-27 VITALS
SYSTOLIC BLOOD PRESSURE: 90 MMHG | RESPIRATION RATE: 24 BRPM | DIASTOLIC BLOOD PRESSURE: 44 MMHG | TEMPERATURE: 97.6 F | HEART RATE: 76 BPM

## 2022-05-27 PROCEDURE — G0156 HHCP-SVS OF AIDE,EA 15 MIN: HCPCS

## 2022-05-27 PROCEDURE — G0299 HHS/HOSPICE OF RN EA 15 MIN: HCPCS

## 2022-05-27 PROCEDURE — T2042 HOSPICE ROUTINE HOME CARE: HCPCS

## 2022-05-28 PROCEDURE — T2042 HOSPICE ROUTINE HOME CARE: HCPCS

## 2022-05-29 PROCEDURE — T2042 HOSPICE ROUTINE HOME CARE: HCPCS

## 2022-05-30 PROCEDURE — 10330057 MEDICATION, GENERAL

## 2022-05-30 PROCEDURE — T2042 HOSPICE ROUTINE HOME CARE: HCPCS

## 2022-05-31 ENCOUNTER — HOME CARE VISIT (OUTPATIENT)
Dept: HOME HEALTH SERVICES | Facility: HOME HEALTHCARE | Age: 87
End: 2022-05-31
Payer: MEDICARE

## 2022-05-31 VITALS
TEMPERATURE: 97.7 F | RESPIRATION RATE: 20 BRPM | DIASTOLIC BLOOD PRESSURE: 62 MMHG | HEART RATE: 72 BPM | SYSTOLIC BLOOD PRESSURE: 118 MMHG

## 2022-05-31 PROCEDURE — T2042 HOSPICE ROUTINE HOME CARE: HCPCS

## 2022-05-31 PROCEDURE — G0299 HHS/HOSPICE OF RN EA 15 MIN: HCPCS

## 2022-06-01 PROCEDURE — T2042 HOSPICE ROUTINE HOME CARE: HCPCS

## 2022-06-02 ENCOUNTER — HOME CARE VISIT (OUTPATIENT)
Dept: HOME HEALTH SERVICES | Facility: HOME HEALTHCARE | Age: 87
End: 2022-06-02
Payer: MEDICARE

## 2022-06-02 ENCOUNTER — HOME CARE VISIT (OUTPATIENT)
Dept: HOME HOSPICE | Facility: HOSPICE | Age: 87
End: 2022-06-02
Payer: MEDICARE

## 2022-06-02 PROCEDURE — G0156 HHCP-SVS OF AIDE,EA 15 MIN: HCPCS

## 2022-06-02 PROCEDURE — T2042 HOSPICE ROUTINE HOME CARE: HCPCS

## 2022-06-03 ENCOUNTER — HOME CARE VISIT (OUTPATIENT)
Dept: HOME HEALTH SERVICES | Facility: HOME HEALTHCARE | Age: 87
End: 2022-06-03
Payer: MEDICARE

## 2022-06-03 VITALS
DIASTOLIC BLOOD PRESSURE: 62 MMHG | TEMPERATURE: 98 F | HEART RATE: 84 BPM | SYSTOLIC BLOOD PRESSURE: 110 MMHG | RESPIRATION RATE: 20 BRPM

## 2022-06-03 PROCEDURE — 10330064 BRIEF, WINGS CHOICE+ QUILTED LG (18/BG 4

## 2022-06-03 PROCEDURE — 10330064 UNDERPAD, REUSE 36X36 1DZ     BECKCL

## 2022-06-03 PROCEDURE — G0299 HHS/HOSPICE OF RN EA 15 MIN: HCPCS

## 2022-06-03 PROCEDURE — T2042 HOSPICE ROUTINE HOME CARE: HCPCS

## 2022-06-04 PROCEDURE — T2042 HOSPICE ROUTINE HOME CARE: HCPCS

## 2022-06-05 PROCEDURE — T2042 HOSPICE ROUTINE HOME CARE: HCPCS

## 2022-06-06 ENCOUNTER — HOME CARE VISIT (OUTPATIENT)
Dept: HOME HEALTH SERVICES | Facility: HOME HEALTHCARE | Age: 87
End: 2022-06-06
Payer: MEDICARE

## 2022-06-06 PROCEDURE — T2042 HOSPICE ROUTINE HOME CARE: HCPCS

## 2022-06-06 PROCEDURE — G0156 HHCP-SVS OF AIDE,EA 15 MIN: HCPCS

## 2022-06-07 ENCOUNTER — HOME CARE VISIT (OUTPATIENT)
Dept: HOME HEALTH SERVICES | Facility: HOME HEALTHCARE | Age: 87
End: 2022-06-07
Payer: MEDICARE

## 2022-06-07 VITALS
TEMPERATURE: 98.2 F | DIASTOLIC BLOOD PRESSURE: 52 MMHG | SYSTOLIC BLOOD PRESSURE: 96 MMHG | RESPIRATION RATE: 20 BRPM | HEART RATE: 76 BPM

## 2022-06-07 PROCEDURE — T2042 HOSPICE ROUTINE HOME CARE: HCPCS

## 2022-06-07 PROCEDURE — G0299 HHS/HOSPICE OF RN EA 15 MIN: HCPCS

## 2022-06-08 ENCOUNTER — HOME CARE VISIT (OUTPATIENT)
Dept: HOME HEALTH SERVICES | Facility: HOME HEALTHCARE | Age: 87
End: 2022-06-08
Payer: MEDICARE

## 2022-06-08 PROCEDURE — T2042 HOSPICE ROUTINE HOME CARE: HCPCS

## 2022-06-08 PROCEDURE — 10330057 MEDICATION, GENERAL

## 2022-06-09 PROCEDURE — T2042 HOSPICE ROUTINE HOME CARE: HCPCS

## 2022-06-10 ENCOUNTER — HOME CARE VISIT (OUTPATIENT)
Dept: HOME HEALTH SERVICES | Facility: HOME HEALTHCARE | Age: 87
End: 2022-06-10
Payer: MEDICARE

## 2022-06-10 VITALS
HEART RATE: 92 BPM | SYSTOLIC BLOOD PRESSURE: 112 MMHG | DIASTOLIC BLOOD PRESSURE: 62 MMHG | RESPIRATION RATE: 18 BRPM | TEMPERATURE: 97.6 F

## 2022-06-10 PROCEDURE — T2042 HOSPICE ROUTINE HOME CARE: HCPCS

## 2022-06-10 PROCEDURE — G0156 HHCP-SVS OF AIDE,EA 15 MIN: HCPCS

## 2022-06-10 PROCEDURE — G0299 HHS/HOSPICE OF RN EA 15 MIN: HCPCS

## 2022-06-11 PROCEDURE — T2042 HOSPICE ROUTINE HOME CARE: HCPCS

## 2022-06-12 ENCOUNTER — HOME CARE VISIT (OUTPATIENT)
Dept: HOME HEALTH SERVICES | Facility: HOME HEALTHCARE | Age: 87
End: 2022-06-12
Payer: MEDICARE

## 2022-06-12 PROCEDURE — T2042 HOSPICE ROUTINE HOME CARE: HCPCS

## 2022-06-12 PROCEDURE — G0156 HHCP-SVS OF AIDE,EA 15 MIN: HCPCS

## 2022-06-13 ENCOUNTER — HOME CARE VISIT (OUTPATIENT)
Dept: HOME HOSPICE | Facility: HOSPICE | Age: 87
End: 2022-06-13
Payer: MEDICARE

## 2022-06-13 PROCEDURE — G0155 HHCP-SVS OF CSW,EA 15 MIN: HCPCS

## 2022-06-13 PROCEDURE — T2042 HOSPICE ROUTINE HOME CARE: HCPCS

## 2022-06-14 ENCOUNTER — HOME CARE VISIT (OUTPATIENT)
Dept: HOME HEALTH SERVICES | Facility: HOME HEALTHCARE | Age: 87
End: 2022-06-14
Payer: MEDICARE

## 2022-06-14 ENCOUNTER — HOME CARE VISIT (OUTPATIENT)
Dept: HOME HOSPICE | Facility: HOSPICE | Age: 87
End: 2022-06-14
Payer: MEDICARE

## 2022-06-14 VITALS
DIASTOLIC BLOOD PRESSURE: 81 MMHG | TEMPERATURE: 98.7 F | RESPIRATION RATE: 18 BRPM | HEART RATE: 71 BPM | SYSTOLIC BLOOD PRESSURE: 114 MMHG

## 2022-06-14 PROCEDURE — T2042 HOSPICE ROUTINE HOME CARE: HCPCS

## 2022-06-14 PROCEDURE — G0299 HHS/HOSPICE OF RN EA 15 MIN: HCPCS

## 2022-06-15 ENCOUNTER — HOME CARE VISIT (OUTPATIENT)
Dept: HOME HEALTH SERVICES | Facility: HOME HEALTHCARE | Age: 87
End: 2022-06-15
Payer: MEDICARE

## 2022-06-15 PROCEDURE — T2042 HOSPICE ROUTINE HOME CARE: HCPCS

## 2022-06-15 PROCEDURE — 10330064 DRESSING, HYDROCOLLOID THIN STR 4"X4" (1

## 2022-06-15 PROCEDURE — 10330064 CLEANSER, WND SEA-CLEANS 6OZ  COLPLT

## 2022-06-15 PROCEDURE — G0156 HHCP-SVS OF AIDE,EA 15 MIN: HCPCS

## 2022-06-16 ENCOUNTER — HOME CARE VISIT (OUTPATIENT)
Dept: HOME HOSPICE | Facility: HOSPICE | Age: 87
End: 2022-06-16
Payer: MEDICARE

## 2022-06-16 PROCEDURE — T2042 HOSPICE ROUTINE HOME CARE: HCPCS

## 2022-06-17 ENCOUNTER — HOME CARE VISIT (OUTPATIENT)
Dept: HOME HEALTH SERVICES | Facility: HOME HEALTHCARE | Age: 87
End: 2022-06-17
Payer: MEDICARE

## 2022-06-17 VITALS
DIASTOLIC BLOOD PRESSURE: 54 MMHG | HEART RATE: 88 BPM | TEMPERATURE: 97.7 F | SYSTOLIC BLOOD PRESSURE: 74 MMHG | RESPIRATION RATE: 16 BRPM

## 2022-06-17 PROCEDURE — T2042 HOSPICE ROUTINE HOME CARE: HCPCS

## 2022-06-17 PROCEDURE — G0299 HHS/HOSPICE OF RN EA 15 MIN: HCPCS

## 2022-06-17 PROCEDURE — G0156 HHCP-SVS OF AIDE,EA 15 MIN: HCPCS

## 2022-06-18 PROCEDURE — T2042 HOSPICE ROUTINE HOME CARE: HCPCS

## 2022-06-19 PROCEDURE — T2042 HOSPICE ROUTINE HOME CARE: HCPCS

## 2022-06-20 PROCEDURE — T2042 HOSPICE ROUTINE HOME CARE: HCPCS

## 2022-06-21 ENCOUNTER — HOME CARE VISIT (OUTPATIENT)
Dept: HOME HEALTH SERVICES | Facility: HOME HEALTHCARE | Age: 87
End: 2022-06-21
Payer: MEDICARE

## 2022-06-21 PROCEDURE — T2042 HOSPICE ROUTINE HOME CARE: HCPCS

## 2022-06-21 PROCEDURE — G0156 HHCP-SVS OF AIDE,EA 15 MIN: HCPCS

## 2022-06-21 PROCEDURE — G0299 HHS/HOSPICE OF RN EA 15 MIN: HCPCS

## 2022-06-21 PROCEDURE — 10330057 MEDICATION, GENERAL

## 2022-06-22 VITALS
SYSTOLIC BLOOD PRESSURE: 90 MMHG | HEART RATE: 76 BPM | DIASTOLIC BLOOD PRESSURE: 40 MMHG | TEMPERATURE: 98.4 F | RESPIRATION RATE: 20 BRPM

## 2022-06-22 PROCEDURE — T2042 HOSPICE ROUTINE HOME CARE: HCPCS

## 2022-06-23 ENCOUNTER — HOME CARE VISIT (OUTPATIENT)
Dept: HOME HEALTH SERVICES | Facility: HOME HEALTHCARE | Age: 87
End: 2022-06-23
Payer: MEDICARE

## 2022-06-23 PROCEDURE — G0156 HHCP-SVS OF AIDE,EA 15 MIN: HCPCS

## 2022-06-23 PROCEDURE — T2042 HOSPICE ROUTINE HOME CARE: HCPCS

## 2022-06-24 ENCOUNTER — HOME CARE VISIT (OUTPATIENT)
Dept: HOME HEALTH SERVICES | Facility: HOME HEALTHCARE | Age: 87
End: 2022-06-24
Payer: MEDICARE

## 2022-06-24 PROCEDURE — T2042 HOSPICE ROUTINE HOME CARE: HCPCS

## 2022-06-25 PROCEDURE — 10330057 MEDICATION, GENERAL

## 2022-06-25 PROCEDURE — T2042 HOSPICE ROUTINE HOME CARE: HCPCS

## 2022-06-26 VITALS
SYSTOLIC BLOOD PRESSURE: 104 MMHG | TEMPERATURE: 96.5 F | HEART RATE: 88 BPM | DIASTOLIC BLOOD PRESSURE: 68 MMHG | RESPIRATION RATE: 24 BRPM

## 2022-06-26 PROCEDURE — T2042 HOSPICE ROUTINE HOME CARE: HCPCS

## 2022-06-27 ENCOUNTER — HOME CARE VISIT (OUTPATIENT)
Dept: HOME HEALTH SERVICES | Facility: HOME HEALTHCARE | Age: 87
End: 2022-06-27
Payer: MEDICARE

## 2022-06-27 PROCEDURE — T2042 HOSPICE ROUTINE HOME CARE: HCPCS

## 2022-06-28 ENCOUNTER — HOME CARE VISIT (OUTPATIENT)
Dept: HOME HEALTH SERVICES | Facility: HOME HEALTHCARE | Age: 87
End: 2022-06-28
Payer: MEDICARE

## 2022-06-28 VITALS
RESPIRATION RATE: 20 BRPM | SYSTOLIC BLOOD PRESSURE: 108 MMHG | HEART RATE: 88 BPM | TEMPERATURE: 97.8 F | DIASTOLIC BLOOD PRESSURE: 60 MMHG

## 2022-06-28 PROCEDURE — T2042 HOSPICE ROUTINE HOME CARE: HCPCS

## 2022-06-29 ENCOUNTER — HOME CARE VISIT (OUTPATIENT)
Dept: HOME HEALTH SERVICES | Facility: HOME HEALTHCARE | Age: 87
End: 2022-06-29
Payer: MEDICARE

## 2022-06-29 PROCEDURE — T2042 HOSPICE ROUTINE HOME CARE: HCPCS

## 2022-06-30 ENCOUNTER — HOME CARE VISIT (OUTPATIENT)
Dept: HOME HOSPICE | Facility: HOSPICE | Age: 87
End: 2022-06-30
Payer: MEDICARE

## 2022-06-30 PROCEDURE — T2042 HOSPICE ROUTINE HOME CARE: HCPCS

## 2022-07-01 ENCOUNTER — HOME CARE VISIT (OUTPATIENT)
Dept: HOME HEALTH SERVICES | Facility: HOME HEALTHCARE | Age: 87
End: 2022-07-01
Payer: MEDICARE

## 2022-07-01 ENCOUNTER — HOME CARE VISIT (OUTPATIENT)
Dept: HOME HOSPICE | Facility: HOSPICE | Age: 87
End: 2022-07-01
Payer: MEDICARE

## 2022-07-01 VITALS
TEMPERATURE: 98 F | HEART RATE: 104 BPM | RESPIRATION RATE: 20 BRPM | SYSTOLIC BLOOD PRESSURE: 96 MMHG | DIASTOLIC BLOOD PRESSURE: 68 MMHG

## 2022-07-01 PROCEDURE — T2042 HOSPICE ROUTINE HOME CARE: HCPCS

## 2022-07-02 PROCEDURE — T2042 HOSPICE ROUTINE HOME CARE: HCPCS

## 2022-07-03 PROCEDURE — T2042 HOSPICE ROUTINE HOME CARE: HCPCS

## 2022-07-04 PROCEDURE — T2042 HOSPICE ROUTINE HOME CARE: HCPCS

## 2022-07-05 ENCOUNTER — HOME CARE VISIT (OUTPATIENT)
Dept: HOME HEALTH SERVICES | Facility: HOME HEALTHCARE | Age: 87
End: 2022-07-05
Payer: MEDICARE

## 2022-07-05 VITALS
RESPIRATION RATE: 24 BRPM | WEIGHT: 157.8 LBS | BODY MASS INDEX: 27.09 KG/M2 | SYSTOLIC BLOOD PRESSURE: 90 MMHG | DIASTOLIC BLOOD PRESSURE: 62 MMHG | TEMPERATURE: 97.7 F | HEART RATE: 96 BPM

## 2022-07-05 PROCEDURE — T2042 HOSPICE ROUTINE HOME CARE: HCPCS

## 2022-07-05 PROCEDURE — 10330057 MEDICATION, GENERAL

## 2022-07-05 PROCEDURE — 10330064 BRIEF, WINGS CHOICE+ QUILTED LG (18/BG 4

## 2022-07-06 ENCOUNTER — HOME CARE VISIT (OUTPATIENT)
Dept: HOME HEALTH SERVICES | Facility: HOME HEALTHCARE | Age: 87
End: 2022-07-06
Payer: MEDICARE

## 2022-07-06 PROCEDURE — G0156 HHCP-SVS OF AIDE,EA 15 MIN: HCPCS

## 2022-07-06 PROCEDURE — T2042 HOSPICE ROUTINE HOME CARE: HCPCS

## 2022-07-07 ENCOUNTER — HOME CARE VISIT (OUTPATIENT)
Dept: HOME HEALTH SERVICES | Facility: HOME HEALTHCARE | Age: 87
End: 2022-07-07
Payer: MEDICARE

## 2022-07-07 PROCEDURE — 10330057 MEDICATION, GENERAL

## 2022-07-07 PROCEDURE — T2042 HOSPICE ROUTINE HOME CARE: HCPCS

## 2022-07-07 PROCEDURE — G0156 HHCP-SVS OF AIDE,EA 15 MIN: HCPCS

## 2022-07-08 ENCOUNTER — HOME CARE VISIT (OUTPATIENT)
Dept: HOME HEALTH SERVICES | Facility: HOME HEALTHCARE | Age: 87
End: 2022-07-08
Payer: MEDICARE

## 2022-07-08 VITALS
HEART RATE: 88 BPM | DIASTOLIC BLOOD PRESSURE: 66 MMHG | RESPIRATION RATE: 20 BRPM | TEMPERATURE: 97.9 F | SYSTOLIC BLOOD PRESSURE: 108 MMHG

## 2022-07-08 PROCEDURE — T2042 HOSPICE ROUTINE HOME CARE: HCPCS

## 2022-07-08 PROCEDURE — G0299 HHS/HOSPICE OF RN EA 15 MIN: HCPCS

## 2022-07-09 PROCEDURE — T2042 HOSPICE ROUTINE HOME CARE: HCPCS

## 2022-07-10 PROCEDURE — T2042 HOSPICE ROUTINE HOME CARE: HCPCS

## 2022-07-11 ENCOUNTER — HOME CARE VISIT (OUTPATIENT)
Dept: HOME HEALTH SERVICES | Facility: HOME HEALTHCARE | Age: 87
End: 2022-07-11
Payer: MEDICARE

## 2022-07-11 PROCEDURE — 10330057 MEDICATION, GENERAL

## 2022-07-11 PROCEDURE — G0156 HHCP-SVS OF AIDE,EA 15 MIN: HCPCS

## 2022-07-11 PROCEDURE — T2042 HOSPICE ROUTINE HOME CARE: HCPCS

## 2022-07-12 ENCOUNTER — HOME CARE VISIT (OUTPATIENT)
Dept: HOME HEALTH SERVICES | Facility: HOME HEALTHCARE | Age: 87
End: 2022-07-12
Payer: MEDICARE

## 2022-07-12 VITALS
OXYGEN SATURATION: 100 % | HEART RATE: 91 BPM | SYSTOLIC BLOOD PRESSURE: 114 MMHG | RESPIRATION RATE: 25 BRPM | DIASTOLIC BLOOD PRESSURE: 59 MMHG

## 2022-07-12 PROCEDURE — G0299 HHS/HOSPICE OF RN EA 15 MIN: HCPCS

## 2022-07-12 PROCEDURE — T2042 HOSPICE ROUTINE HOME CARE: HCPCS

## 2022-07-13 ENCOUNTER — HOME CARE VISIT (OUTPATIENT)
Dept: HOME HEALTH SERVICES | Facility: HOME HEALTHCARE | Age: 87
End: 2022-07-13
Payer: MEDICARE

## 2022-07-13 PROCEDURE — G0156 HHCP-SVS OF AIDE,EA 15 MIN: HCPCS

## 2022-07-13 PROCEDURE — T2042 HOSPICE ROUTINE HOME CARE: HCPCS

## 2022-07-14 ENCOUNTER — HOME CARE VISIT (OUTPATIENT)
Dept: HOME HOSPICE | Facility: HOSPICE | Age: 87
End: 2022-07-14
Payer: MEDICARE

## 2022-07-14 PROCEDURE — T2042 HOSPICE ROUTINE HOME CARE: HCPCS

## 2022-07-14 PROCEDURE — 10330057 MEDICATION, GENERAL

## 2022-07-15 ENCOUNTER — HOME CARE VISIT (OUTPATIENT)
Dept: HOME HEALTH SERVICES | Facility: HOME HEALTHCARE | Age: 87
End: 2022-07-15
Payer: MEDICARE

## 2022-07-15 VITALS
SYSTOLIC BLOOD PRESSURE: 108 MMHG | DIASTOLIC BLOOD PRESSURE: 78 MMHG | TEMPERATURE: 97.5 F | RESPIRATION RATE: 20 BRPM | HEART RATE: 72 BPM

## 2022-07-15 PROCEDURE — G0156 HHCP-SVS OF AIDE,EA 15 MIN: HCPCS

## 2022-07-15 PROCEDURE — T2042 HOSPICE ROUTINE HOME CARE: HCPCS

## 2022-07-15 PROCEDURE — G0299 HHS/HOSPICE OF RN EA 15 MIN: HCPCS

## 2022-07-16 PROCEDURE — T2042 HOSPICE ROUTINE HOME CARE: HCPCS

## 2022-07-17 PROCEDURE — T2042 HOSPICE ROUTINE HOME CARE: HCPCS

## 2022-07-18 PROCEDURE — T2042 HOSPICE ROUTINE HOME CARE: HCPCS

## 2022-07-19 ENCOUNTER — HOME CARE VISIT (OUTPATIENT)
Dept: HOME HEALTH SERVICES | Facility: HOME HEALTHCARE | Age: 87
End: 2022-07-19
Payer: MEDICARE

## 2022-07-19 VITALS
SYSTOLIC BLOOD PRESSURE: 96 MMHG | RESPIRATION RATE: 20 BRPM | HEART RATE: 72 BPM | DIASTOLIC BLOOD PRESSURE: 66 MMHG | TEMPERATURE: 97.2 F | BODY MASS INDEX: 27.09 KG/M2 | WEIGHT: 157.8 LBS

## 2022-07-19 PROCEDURE — G0156 HHCP-SVS OF AIDE,EA 15 MIN: HCPCS

## 2022-07-19 PROCEDURE — T2042 HOSPICE ROUTINE HOME CARE: HCPCS

## 2022-07-19 PROCEDURE — G0299 HHS/HOSPICE OF RN EA 15 MIN: HCPCS

## 2022-07-20 ENCOUNTER — HOME CARE VISIT (OUTPATIENT)
Dept: HOME HOSPICE | Facility: HOSPICE | Age: 87
End: 2022-07-20
Payer: MEDICARE

## 2022-07-20 ENCOUNTER — HOME CARE VISIT (OUTPATIENT)
Dept: HOME HEALTH SERVICES | Facility: HOME HEALTHCARE | Age: 87
End: 2022-07-20
Payer: MEDICARE

## 2022-07-20 PROCEDURE — T2042 HOSPICE ROUTINE HOME CARE: HCPCS

## 2022-07-20 PROCEDURE — G0155 HHCP-SVS OF CSW,EA 15 MIN: HCPCS

## 2022-07-20 PROCEDURE — G0156 HHCP-SVS OF AIDE,EA 15 MIN: HCPCS

## 2022-07-21 PROCEDURE — T2042 HOSPICE ROUTINE HOME CARE: HCPCS

## 2022-07-22 ENCOUNTER — HOME CARE VISIT (OUTPATIENT)
Dept: HOME HEALTH SERVICES | Facility: HOME HEALTHCARE | Age: 87
End: 2022-07-22
Payer: MEDICARE

## 2022-07-22 VITALS — DIASTOLIC BLOOD PRESSURE: 100 MMHG | TEMPERATURE: 97.7 F | SYSTOLIC BLOOD PRESSURE: 140 MMHG

## 2022-07-22 PROCEDURE — G0299 HHS/HOSPICE OF RN EA 15 MIN: HCPCS

## 2022-07-22 PROCEDURE — T2042 HOSPICE ROUTINE HOME CARE: HCPCS

## 2022-07-22 PROCEDURE — G0156 HHCP-SVS OF AIDE,EA 15 MIN: HCPCS

## 2022-07-23 PROCEDURE — T2042 HOSPICE ROUTINE HOME CARE: HCPCS

## 2022-07-24 PROCEDURE — T2042 HOSPICE ROUTINE HOME CARE: HCPCS

## 2022-07-25 ENCOUNTER — HOME CARE VISIT (OUTPATIENT)
Dept: HOME HEALTH SERVICES | Facility: HOME HEALTHCARE | Age: 87
End: 2022-07-25
Payer: MEDICARE

## 2022-07-25 PROCEDURE — T2042 HOSPICE ROUTINE HOME CARE: HCPCS

## 2022-07-25 PROCEDURE — G0156 HHCP-SVS OF AIDE,EA 15 MIN: HCPCS

## 2022-07-26 ENCOUNTER — HOME CARE VISIT (OUTPATIENT)
Dept: HOME HOSPICE | Facility: HOSPICE | Age: 87
End: 2022-07-26
Payer: MEDICARE

## 2022-07-26 ENCOUNTER — HOME CARE VISIT (OUTPATIENT)
Dept: HOME HEALTH SERVICES | Facility: HOME HEALTHCARE | Age: 87
End: 2022-07-26
Payer: MEDICARE

## 2022-07-26 VITALS
RESPIRATION RATE: 24 BRPM | TEMPERATURE: 98.1 F | SYSTOLIC BLOOD PRESSURE: 112 MMHG | HEART RATE: 84 BPM | DIASTOLIC BLOOD PRESSURE: 68 MMHG

## 2022-07-26 PROCEDURE — T2042 HOSPICE ROUTINE HOME CARE: HCPCS

## 2022-07-26 PROCEDURE — 10330064 BRIEF, WINGS CHOICE+ QUILTED LG (18/BG 4

## 2022-07-26 PROCEDURE — G0299 HHS/HOSPICE OF RN EA 15 MIN: HCPCS

## 2022-07-27 ENCOUNTER — HOME CARE VISIT (OUTPATIENT)
Dept: HOME HEALTH SERVICES | Facility: HOME HEALTHCARE | Age: 87
End: 2022-07-27
Payer: MEDICARE

## 2022-07-27 PROCEDURE — G0156 HHCP-SVS OF AIDE,EA 15 MIN: HCPCS

## 2022-07-27 PROCEDURE — T2042 HOSPICE ROUTINE HOME CARE: HCPCS

## 2022-07-28 ENCOUNTER — HOME CARE VISIT (OUTPATIENT)
Dept: HOME HOSPICE | Facility: HOSPICE | Age: 87
End: 2022-07-28
Payer: MEDICARE

## 2022-07-28 PROCEDURE — T2042 HOSPICE ROUTINE HOME CARE: HCPCS

## 2022-07-29 ENCOUNTER — HOME CARE VISIT (OUTPATIENT)
Dept: HOME HEALTH SERVICES | Facility: HOME HEALTHCARE | Age: 87
End: 2022-07-29
Payer: MEDICARE

## 2022-07-29 VITALS — TEMPERATURE: 97.5 F | RESPIRATION RATE: 44 BRPM

## 2022-07-29 PROCEDURE — G0299 HHS/HOSPICE OF RN EA 15 MIN: HCPCS

## 2022-07-29 PROCEDURE — G0156 HHCP-SVS OF AIDE,EA 15 MIN: HCPCS

## 2022-07-29 PROCEDURE — T2042 HOSPICE ROUTINE HOME CARE: HCPCS

## 2022-07-30 PROCEDURE — T2042 HOSPICE ROUTINE HOME CARE: HCPCS

## 2022-07-31 PROCEDURE — T2042 HOSPICE ROUTINE HOME CARE: HCPCS

## 2022-08-01 ENCOUNTER — HOME CARE VISIT (OUTPATIENT)
Dept: HOME HEALTH SERVICES | Facility: HOME HEALTHCARE | Age: 87
End: 2022-08-01
Payer: MEDICARE

## 2022-08-01 PROCEDURE — G0156 HHCP-SVS OF AIDE,EA 15 MIN: HCPCS

## 2022-08-01 PROCEDURE — T2042 HOSPICE ROUTINE HOME CARE: HCPCS

## 2022-08-02 ENCOUNTER — HOME CARE VISIT (OUTPATIENT)
Dept: HOME HEALTH SERVICES | Facility: HOME HEALTHCARE | Age: 87
End: 2022-08-02
Payer: MEDICARE

## 2022-08-02 ENCOUNTER — HOME CARE VISIT (OUTPATIENT)
Dept: HOME HOSPICE | Facility: HOSPICE | Age: 87
End: 2022-08-02
Payer: MEDICARE

## 2022-08-02 VITALS
RESPIRATION RATE: 18 BRPM | SYSTOLIC BLOOD PRESSURE: 123 MMHG | DIASTOLIC BLOOD PRESSURE: 84 MMHG | TEMPERATURE: 98.2 F | HEART RATE: 131 BPM

## 2022-08-02 PROCEDURE — G0299 HHS/HOSPICE OF RN EA 15 MIN: HCPCS

## 2022-08-02 PROCEDURE — T2042 HOSPICE ROUTINE HOME CARE: HCPCS

## 2022-08-02 PROCEDURE — G0155 HHCP-SVS OF CSW,EA 15 MIN: HCPCS

## 2022-08-03 ENCOUNTER — HOME CARE VISIT (OUTPATIENT)
Dept: HOME HEALTH SERVICES | Facility: HOME HEALTHCARE | Age: 87
End: 2022-08-03
Payer: MEDICARE

## 2022-08-03 PROCEDURE — G0156 HHCP-SVS OF AIDE,EA 15 MIN: HCPCS

## 2022-08-03 PROCEDURE — T2042 HOSPICE ROUTINE HOME CARE: HCPCS

## 2022-08-04 PROCEDURE — T2042 HOSPICE ROUTINE HOME CARE: HCPCS

## 2022-08-05 ENCOUNTER — HOME CARE VISIT (OUTPATIENT)
Dept: HOME HEALTH SERVICES | Facility: HOME HEALTHCARE | Age: 87
End: 2022-08-05
Payer: MEDICARE

## 2022-08-05 VITALS
RESPIRATION RATE: 28 BRPM | HEART RATE: 60 BPM | DIASTOLIC BLOOD PRESSURE: 68 MMHG | TEMPERATURE: 97.8 F | SYSTOLIC BLOOD PRESSURE: 126 MMHG

## 2022-08-05 PROCEDURE — G0299 HHS/HOSPICE OF RN EA 15 MIN: HCPCS

## 2022-08-05 PROCEDURE — G0156 HHCP-SVS OF AIDE,EA 15 MIN: HCPCS

## 2022-08-05 PROCEDURE — T2042 HOSPICE ROUTINE HOME CARE: HCPCS

## 2022-08-06 PROCEDURE — T2042 HOSPICE ROUTINE HOME CARE: HCPCS

## 2022-08-07 PROCEDURE — T2042 HOSPICE ROUTINE HOME CARE: HCPCS

## 2022-08-08 ENCOUNTER — HOME CARE VISIT (OUTPATIENT)
Dept: HOME HEALTH SERVICES | Facility: HOME HEALTHCARE | Age: 87
End: 2022-08-08
Payer: MEDICARE

## 2022-08-08 PROCEDURE — T2042 HOSPICE ROUTINE HOME CARE: HCPCS

## 2022-08-08 PROCEDURE — G0156 HHCP-SVS OF AIDE,EA 15 MIN: HCPCS

## 2022-08-09 ENCOUNTER — HOME CARE VISIT (OUTPATIENT)
Dept: HOME HEALTH SERVICES | Facility: HOME HEALTHCARE | Age: 87
End: 2022-08-09
Payer: MEDICARE

## 2022-08-09 VITALS
RESPIRATION RATE: 24 BRPM | SYSTOLIC BLOOD PRESSURE: 110 MMHG | TEMPERATURE: 98.6 F | HEART RATE: 76 BPM | DIASTOLIC BLOOD PRESSURE: 58 MMHG

## 2022-08-09 PROCEDURE — G0299 HHS/HOSPICE OF RN EA 15 MIN: HCPCS

## 2022-08-09 PROCEDURE — T2042 HOSPICE ROUTINE HOME CARE: HCPCS

## 2022-08-10 ENCOUNTER — HOME CARE VISIT (OUTPATIENT)
Dept: HOME HOSPICE | Facility: HOSPICE | Age: 87
End: 2022-08-10
Payer: MEDICARE

## 2022-08-12 ENCOUNTER — HOME CARE VISIT (OUTPATIENT)
Dept: HOME HEALTH SERVICES | Facility: HOME HEALTHCARE | Age: 87
End: 2022-08-12
Payer: MEDICARE

## 2022-08-12 PROCEDURE — G0156 HHCP-SVS OF AIDE,EA 15 MIN: HCPCS

## 2022-08-15 ENCOUNTER — HOME CARE VISIT (OUTPATIENT)
Dept: HOME HEALTH SERVICES | Facility: HOME HEALTHCARE | Age: 87
End: 2022-08-15
Payer: MEDICARE

## 2022-08-15 ENCOUNTER — HOME CARE VISIT (OUTPATIENT)
Dept: HOME HOSPICE | Facility: HOSPICE | Age: 87
End: 2022-08-15
Payer: MEDICARE

## 2022-08-15 PROCEDURE — G0156 HHCP-SVS OF AIDE,EA 15 MIN: HCPCS

## 2022-08-15 PROCEDURE — G0155 HHCP-SVS OF CSW,EA 15 MIN: HCPCS

## 2022-08-17 ENCOUNTER — HOME CARE VISIT (OUTPATIENT)
Dept: HOME HEALTH SERVICES | Facility: HOME HEALTHCARE | Age: 87
End: 2022-08-17
Payer: MEDICARE

## 2022-08-17 PROCEDURE — G0156 HHCP-SVS OF AIDE,EA 15 MIN: HCPCS

## 2022-08-18 DIAGNOSIS — Z51.5 HOSPICE CARE: Primary | ICD-10-CM

## 2022-08-19 ENCOUNTER — HOME CARE VISIT (OUTPATIENT)
Dept: HOME HEALTH SERVICES | Facility: HOME HEALTHCARE | Age: 87
End: 2022-08-19
Payer: MEDICARE

## 2022-08-19 ENCOUNTER — HOME CARE VISIT (OUTPATIENT)
Dept: HOME HOSPICE | Facility: HOSPICE | Age: 87
End: 2022-08-19
Payer: MEDICARE

## 2022-08-19 VITALS
TEMPERATURE: 97.5 F | SYSTOLIC BLOOD PRESSURE: 118 MMHG | HEART RATE: 68 BPM | RESPIRATION RATE: 20 BRPM | DIASTOLIC BLOOD PRESSURE: 76 MMHG

## 2022-08-19 PROCEDURE — G0156 HHCP-SVS OF AIDE,EA 15 MIN: HCPCS

## 2022-08-19 PROCEDURE — G0299 HHS/HOSPICE OF RN EA 15 MIN: HCPCS

## 2022-08-19 RX ORDER — PETROLATUM,WHITE 51.1 %
OINTMENT (GRAM) TOPICAL
Qty: 99 G | Refills: 0 | Status: SHIPPED | OUTPATIENT
Start: 2022-08-19

## 2022-08-22 ENCOUNTER — HOME CARE VISIT (OUTPATIENT)
Dept: HOME HEALTH SERVICES | Facility: HOME HEALTHCARE | Age: 87
End: 2022-08-22
Payer: MEDICARE

## 2022-08-22 PROCEDURE — G0156 HHCP-SVS OF AIDE,EA 15 MIN: HCPCS

## 2022-08-23 ENCOUNTER — HOME CARE VISIT (OUTPATIENT)
Dept: HOME HEALTH SERVICES | Facility: HOME HEALTHCARE | Age: 87
End: 2022-08-23
Payer: MEDICARE

## 2022-08-23 ENCOUNTER — HOME CARE VISIT (OUTPATIENT)
Dept: HOME HOSPICE | Facility: HOSPICE | Age: 87
End: 2022-08-23
Payer: MEDICARE

## 2022-08-23 VITALS
HEART RATE: 80 BPM | TEMPERATURE: 97.4 F | DIASTOLIC BLOOD PRESSURE: 56 MMHG | SYSTOLIC BLOOD PRESSURE: 100 MMHG | RESPIRATION RATE: 24 BRPM

## 2022-08-23 PROCEDURE — G0299 HHS/HOSPICE OF RN EA 15 MIN: HCPCS

## 2022-08-23 PROCEDURE — G0156 HHCP-SVS OF AIDE,EA 15 MIN: HCPCS

## 2022-08-24 ENCOUNTER — HOME CARE VISIT (OUTPATIENT)
Dept: HOME HOSPICE | Facility: HOSPICE | Age: 87
End: 2022-08-24
Payer: MEDICARE

## 2022-08-25 ENCOUNTER — HOME CARE VISIT (OUTPATIENT)
Dept: HOME HEALTH SERVICES | Facility: HOME HEALTHCARE | Age: 87
End: 2022-08-25
Payer: MEDICARE

## 2022-08-25 VITALS
HEART RATE: 84 BPM | TEMPERATURE: 97.9 F | RESPIRATION RATE: 20 BRPM | DIASTOLIC BLOOD PRESSURE: 62 MMHG | SYSTOLIC BLOOD PRESSURE: 118 MMHG

## 2022-08-25 PROCEDURE — G0299 HHS/HOSPICE OF RN EA 15 MIN: HCPCS

## 2022-08-30 ENCOUNTER — HOME CARE VISIT (OUTPATIENT)
Dept: HOME HEALTH SERVICES | Facility: HOME HEALTHCARE | Age: 87
End: 2022-08-30
Payer: MEDICARE

## 2022-08-30 VITALS
DIASTOLIC BLOOD PRESSURE: 50 MMHG | RESPIRATION RATE: 24 BRPM | HEART RATE: 80 BPM | TEMPERATURE: 98.2 F | SYSTOLIC BLOOD PRESSURE: 100 MMHG

## 2022-08-30 PROCEDURE — G0156 HHCP-SVS OF AIDE,EA 15 MIN: HCPCS

## 2022-08-30 PROCEDURE — G0299 HHS/HOSPICE OF RN EA 15 MIN: HCPCS

## 2022-08-31 ENCOUNTER — HOME CARE VISIT (OUTPATIENT)
Dept: HOME HEALTH SERVICES | Facility: HOME HEALTHCARE | Age: 87
End: 2022-08-31
Payer: MEDICARE

## 2022-08-31 PROCEDURE — G0156 HHCP-SVS OF AIDE,EA 15 MIN: HCPCS

## 2022-09-01 ENCOUNTER — HOME CARE VISIT (OUTPATIENT)
Dept: HOME HOSPICE | Facility: HOSPICE | Age: 87
End: 2022-09-01
Payer: MEDICARE

## 2022-09-01 PROCEDURE — G0155 HHCP-SVS OF CSW,EA 15 MIN: HCPCS

## 2022-09-02 ENCOUNTER — HOME CARE VISIT (OUTPATIENT)
Dept: HOME HEALTH SERVICES | Facility: HOME HEALTHCARE | Age: 87
End: 2022-09-02
Payer: MEDICARE

## 2022-09-02 VITALS
TEMPERATURE: 97.8 F | RESPIRATION RATE: 28 BRPM | SYSTOLIC BLOOD PRESSURE: 96 MMHG | HEART RATE: 84 BPM | DIASTOLIC BLOOD PRESSURE: 54 MMHG

## 2022-09-02 PROCEDURE — G0299 HHS/HOSPICE OF RN EA 15 MIN: HCPCS

## 2022-09-02 PROCEDURE — G0156 HHCP-SVS OF AIDE,EA 15 MIN: HCPCS

## 2022-09-06 ENCOUNTER — HOME CARE VISIT (OUTPATIENT)
Dept: HOME HEALTH SERVICES | Facility: HOME HEALTHCARE | Age: 87
End: 2022-09-06
Payer: MEDICARE

## 2022-09-06 VITALS
TEMPERATURE: 97.5 F | BODY MASS INDEX: 22.66 KG/M2 | SYSTOLIC BLOOD PRESSURE: 146 MMHG | WEIGHT: 132 LBS | RESPIRATION RATE: 16 BRPM | DIASTOLIC BLOOD PRESSURE: 78 MMHG | HEART RATE: 88 BPM

## 2022-09-06 PROCEDURE — G0299 HHS/HOSPICE OF RN EA 15 MIN: HCPCS

## 2022-09-07 ENCOUNTER — HOME CARE VISIT (OUTPATIENT)
Dept: HOME HEALTH SERVICES | Facility: HOME HEALTHCARE | Age: 87
End: 2022-09-07
Payer: MEDICARE

## 2022-09-07 PROCEDURE — G0156 HHCP-SVS OF AIDE,EA 15 MIN: HCPCS

## 2022-09-08 ENCOUNTER — HOME CARE VISIT (OUTPATIENT)
Dept: HOME HOSPICE | Facility: HOSPICE | Age: 87
End: 2022-09-08
Payer: MEDICARE

## 2022-09-09 ENCOUNTER — HOME CARE VISIT (OUTPATIENT)
Dept: HOME HEALTH SERVICES | Facility: HOME HEALTHCARE | Age: 87
End: 2022-09-09
Payer: MEDICARE

## 2022-09-09 VITALS
TEMPERATURE: 97.8 F | RESPIRATION RATE: 20 BRPM | SYSTOLIC BLOOD PRESSURE: 118 MMHG | HEART RATE: 68 BPM | DIASTOLIC BLOOD PRESSURE: 70 MMHG

## 2022-09-09 PROCEDURE — G0299 HHS/HOSPICE OF RN EA 15 MIN: HCPCS

## 2022-09-09 PROCEDURE — G0156 HHCP-SVS OF AIDE,EA 15 MIN: HCPCS

## 2022-09-10 ENCOUNTER — HOME CARE VISIT (OUTPATIENT)
Dept: HOME HEALTH SERVICES | Facility: HOME HEALTHCARE | Age: 87
End: 2022-09-10
Payer: MEDICARE

## 2022-09-10 PROCEDURE — G0156 HHCP-SVS OF AIDE,EA 15 MIN: HCPCS

## 2022-09-11 ENCOUNTER — HOME CARE VISIT (OUTPATIENT)
Dept: HOME HEALTH SERVICES | Facility: HOME HEALTHCARE | Age: 87
End: 2022-09-11
Payer: MEDICARE

## 2022-09-11 PROCEDURE — G0156 HHCP-SVS OF AIDE,EA 15 MIN: HCPCS

## 2022-09-12 ENCOUNTER — HOME CARE VISIT (OUTPATIENT)
Dept: HOME HEALTH SERVICES | Facility: HOME HEALTHCARE | Age: 87
End: 2022-09-12
Payer: MEDICARE

## 2022-09-12 PROCEDURE — G0156 HHCP-SVS OF AIDE,EA 15 MIN: HCPCS

## 2022-09-13 ENCOUNTER — HOME CARE VISIT (OUTPATIENT)
Dept: HOME HEALTH SERVICES | Facility: HOME HEALTHCARE | Age: 87
End: 2022-09-13
Payer: MEDICARE

## 2022-09-13 VITALS
TEMPERATURE: 97.7 F | DIASTOLIC BLOOD PRESSURE: 62 MMHG | HEART RATE: 88 BPM | RESPIRATION RATE: 24 BRPM | SYSTOLIC BLOOD PRESSURE: 112 MMHG

## 2022-09-13 DIAGNOSIS — Z51.5 HOSPICE CARE: Primary | ICD-10-CM

## 2022-09-13 PROCEDURE — G0299 HHS/HOSPICE OF RN EA 15 MIN: HCPCS

## 2022-09-13 RX ORDER — GABAPENTIN 300 MG/1
CAPSULE ORAL
Qty: 15 CAPSULE | Refills: 4 | Status: SHIPPED | OUTPATIENT
Start: 2022-09-13

## 2022-09-14 ENCOUNTER — HOME CARE VISIT (OUTPATIENT)
Dept: HOME HEALTH SERVICES | Facility: HOME HEALTHCARE | Age: 87
End: 2022-09-14
Payer: MEDICARE

## 2022-09-14 PROCEDURE — G0156 HHCP-SVS OF AIDE,EA 15 MIN: HCPCS

## 2022-09-15 ENCOUNTER — HOME CARE VISIT (OUTPATIENT)
Dept: HOME HOSPICE | Facility: HOSPICE | Age: 87
End: 2022-09-15
Payer: MEDICARE

## 2022-09-15 ENCOUNTER — HOME CARE VISIT (OUTPATIENT)
Dept: HOME HEALTH SERVICES | Facility: HOME HEALTHCARE | Age: 87
End: 2022-09-15
Payer: MEDICARE

## 2022-09-15 PROCEDURE — G0155 HHCP-SVS OF CSW,EA 15 MIN: HCPCS

## 2022-09-15 PROCEDURE — G0156 HHCP-SVS OF AIDE,EA 15 MIN: HCPCS

## 2022-09-16 ENCOUNTER — HOME CARE VISIT (OUTPATIENT)
Dept: HOME HEALTH SERVICES | Facility: HOME HEALTHCARE | Age: 87
End: 2022-09-16
Payer: MEDICARE

## 2022-09-16 PROCEDURE — G0156 HHCP-SVS OF AIDE,EA 15 MIN: HCPCS

## 2022-09-16 PROCEDURE — G0299 HHS/HOSPICE OF RN EA 15 MIN: HCPCS

## 2022-09-17 ENCOUNTER — HOME CARE VISIT (OUTPATIENT)
Dept: HOME HEALTH SERVICES | Facility: HOME HEALTHCARE | Age: 87
End: 2022-09-17
Payer: MEDICARE

## 2022-09-17 PROCEDURE — G0156 HHCP-SVS OF AIDE,EA 15 MIN: HCPCS

## 2022-09-19 ENCOUNTER — HOME CARE VISIT (OUTPATIENT)
Dept: HOME HEALTH SERVICES | Facility: HOME HEALTHCARE | Age: 87
End: 2022-09-19
Payer: MEDICARE

## 2022-09-19 PROCEDURE — G0156 HHCP-SVS OF AIDE,EA 15 MIN: HCPCS

## 2022-09-20 ENCOUNTER — HOME CARE VISIT (OUTPATIENT)
Dept: HOME HEALTH SERVICES | Facility: HOME HEALTHCARE | Age: 87
End: 2022-09-20
Payer: MEDICARE

## 2022-09-20 VITALS
HEART RATE: 80 BPM | DIASTOLIC BLOOD PRESSURE: 74 MMHG | RESPIRATION RATE: 20 BRPM | TEMPERATURE: 97.5 F | SYSTOLIC BLOOD PRESSURE: 116 MMHG

## 2022-09-20 PROCEDURE — G0299 HHS/HOSPICE OF RN EA 15 MIN: HCPCS

## 2022-09-20 PROCEDURE — G0156 HHCP-SVS OF AIDE,EA 15 MIN: HCPCS

## 2022-09-21 ENCOUNTER — HOME CARE VISIT (OUTPATIENT)
Dept: HOME HEALTH SERVICES | Facility: HOME HEALTHCARE | Age: 87
End: 2022-09-21
Payer: MEDICARE

## 2022-09-21 PROCEDURE — G0156 HHCP-SVS OF AIDE,EA 15 MIN: HCPCS

## 2022-09-22 ENCOUNTER — HOME CARE VISIT (OUTPATIENT)
Dept: HOME HEALTH SERVICES | Facility: HOME HEALTHCARE | Age: 87
End: 2022-09-22
Payer: MEDICARE

## 2022-09-22 PROCEDURE — G0156 HHCP-SVS OF AIDE,EA 15 MIN: HCPCS

## 2022-09-23 ENCOUNTER — HOME CARE VISIT (OUTPATIENT)
Dept: HOME HEALTH SERVICES | Facility: HOME HEALTHCARE | Age: 87
End: 2022-09-23
Payer: MEDICARE

## 2022-09-23 VITALS
RESPIRATION RATE: 24 BRPM | TEMPERATURE: 97.6 F | SYSTOLIC BLOOD PRESSURE: 128 MMHG | DIASTOLIC BLOOD PRESSURE: 56 MMHG | HEART RATE: 88 BPM

## 2022-09-23 PROCEDURE — G0299 HHS/HOSPICE OF RN EA 15 MIN: HCPCS

## 2022-09-23 PROCEDURE — G0156 HHCP-SVS OF AIDE,EA 15 MIN: HCPCS

## 2022-09-24 ENCOUNTER — HOME CARE VISIT (OUTPATIENT)
Dept: HOME HEALTH SERVICES | Facility: HOME HEALTHCARE | Age: 87
End: 2022-09-24
Payer: MEDICARE

## 2022-09-24 PROCEDURE — G0156 HHCP-SVS OF AIDE,EA 15 MIN: HCPCS

## 2022-09-26 ENCOUNTER — HOME CARE VISIT (OUTPATIENT)
Dept: HOME HEALTH SERVICES | Facility: HOME HEALTHCARE | Age: 87
End: 2022-09-26
Payer: MEDICARE

## 2022-09-26 PROCEDURE — G0156 HHCP-SVS OF AIDE,EA 15 MIN: HCPCS

## 2022-09-27 ENCOUNTER — HOME CARE VISIT (OUTPATIENT)
Dept: HOME HEALTH SERVICES | Facility: HOME HEALTHCARE | Age: 87
End: 2022-09-27
Payer: MEDICARE

## 2022-09-27 ENCOUNTER — HOME CARE VISIT (OUTPATIENT)
Dept: HOME HOSPICE | Facility: HOSPICE | Age: 87
End: 2022-09-27
Payer: MEDICARE

## 2022-09-27 VITALS
TEMPERATURE: 97.6 F | SYSTOLIC BLOOD PRESSURE: 108 MMHG | DIASTOLIC BLOOD PRESSURE: 60 MMHG | HEART RATE: 80 BPM | RESPIRATION RATE: 20 BRPM

## 2022-09-27 PROCEDURE — G0156 HHCP-SVS OF AIDE,EA 15 MIN: HCPCS

## 2022-09-27 PROCEDURE — G0299 HHS/HOSPICE OF RN EA 15 MIN: HCPCS

## 2022-09-28 ENCOUNTER — HOME CARE VISIT (OUTPATIENT)
Dept: HOME HEALTH SERVICES | Facility: HOME HEALTHCARE | Age: 87
End: 2022-09-28
Payer: MEDICARE

## 2022-09-28 PROCEDURE — G0156 HHCP-SVS OF AIDE,EA 15 MIN: HCPCS

## 2022-09-29 ENCOUNTER — HOME CARE VISIT (OUTPATIENT)
Dept: HOME HOSPICE | Facility: HOSPICE | Age: 87
End: 2022-09-29
Payer: MEDICARE

## 2022-09-29 ENCOUNTER — HOME CARE VISIT (OUTPATIENT)
Dept: HOME HEALTH SERVICES | Facility: HOME HEALTHCARE | Age: 87
End: 2022-09-29
Payer: MEDICARE

## 2022-09-29 PROCEDURE — G0156 HHCP-SVS OF AIDE,EA 15 MIN: HCPCS

## 2022-09-29 PROCEDURE — G0155 HHCP-SVS OF CSW,EA 15 MIN: HCPCS

## 2022-09-30 ENCOUNTER — HOME CARE VISIT (OUTPATIENT)
Dept: HOME HOSPICE | Facility: HOSPICE | Age: 87
End: 2022-09-30
Payer: MEDICARE

## 2022-09-30 ENCOUNTER — HOME CARE VISIT (OUTPATIENT)
Dept: HOME HEALTH SERVICES | Facility: HOME HEALTHCARE | Age: 87
End: 2022-09-30
Payer: MEDICARE

## 2022-09-30 VITALS — SYSTOLIC BLOOD PRESSURE: 90 MMHG | DIASTOLIC BLOOD PRESSURE: 58 MMHG

## 2022-09-30 PROCEDURE — G0156 HHCP-SVS OF AIDE,EA 15 MIN: HCPCS

## 2022-09-30 PROCEDURE — G0299 HHS/HOSPICE OF RN EA 15 MIN: HCPCS

## 2022-10-03 ENCOUNTER — HOME CARE VISIT (OUTPATIENT)
Dept: HOME HEALTH SERVICES | Facility: HOME HEALTHCARE | Age: 87
End: 2022-10-03
Payer: MEDICARE

## 2022-10-03 PROCEDURE — G0156 HHCP-SVS OF AIDE,EA 15 MIN: HCPCS

## 2022-10-04 ENCOUNTER — HOME CARE VISIT (OUTPATIENT)
Dept: HOME HEALTH SERVICES | Facility: HOME HEALTHCARE | Age: 87
End: 2022-10-04
Payer: MEDICARE

## 2022-10-04 PROCEDURE — G0156 HHCP-SVS OF AIDE,EA 15 MIN: HCPCS

## 2022-10-05 ENCOUNTER — HOME CARE VISIT (OUTPATIENT)
Dept: HOME HEALTH SERVICES | Facility: HOME HEALTHCARE | Age: 87
End: 2022-10-05
Payer: MEDICARE

## 2022-10-05 VITALS
TEMPERATURE: 98.6 F | DIASTOLIC BLOOD PRESSURE: 60 MMHG | HEART RATE: 88 BPM | RESPIRATION RATE: 20 BRPM | SYSTOLIC BLOOD PRESSURE: 118 MMHG

## 2022-10-05 PROCEDURE — G0156 HHCP-SVS OF AIDE,EA 15 MIN: HCPCS

## 2022-10-05 PROCEDURE — G0299 HHS/HOSPICE OF RN EA 15 MIN: HCPCS

## 2022-10-06 ENCOUNTER — HOME CARE VISIT (OUTPATIENT)
Dept: HOME HEALTH SERVICES | Facility: HOME HEALTHCARE | Age: 87
End: 2022-10-06
Payer: MEDICARE

## 2022-10-06 PROCEDURE — G0156 HHCP-SVS OF AIDE,EA 15 MIN: HCPCS

## 2022-10-07 ENCOUNTER — HOME CARE VISIT (OUTPATIENT)
Dept: HOME HEALTH SERVICES | Facility: HOME HEALTHCARE | Age: 87
End: 2022-10-07
Payer: MEDICARE

## 2022-10-07 PROCEDURE — G0156 HHCP-SVS OF AIDE,EA 15 MIN: HCPCS

## 2022-10-08 DIAGNOSIS — Z51.5 HOSPICE CARE: Primary | ICD-10-CM

## 2022-10-08 RX ORDER — LIDOCAINE 50 MG/G
OINTMENT TOPICAL
Qty: 35.44 G | Refills: 4 | Status: SHIPPED | OUTPATIENT
Start: 2022-10-08 | End: 2022-10-11 | Stop reason: ALTCHOICE

## 2022-10-10 ENCOUNTER — HOME CARE VISIT (OUTPATIENT)
Dept: HOME HEALTH SERVICES | Facility: HOME HEALTHCARE | Age: 87
End: 2022-10-10
Payer: MEDICARE

## 2022-10-10 PROCEDURE — G0156 HHCP-SVS OF AIDE,EA 15 MIN: HCPCS

## 2022-10-11 ENCOUNTER — HOME CARE VISIT (OUTPATIENT)
Dept: HOME HOSPICE | Facility: HOSPICE | Age: 87
End: 2022-10-11
Payer: MEDICARE

## 2022-10-11 ENCOUNTER — HOME CARE VISIT (OUTPATIENT)
Dept: HOME HEALTH SERVICES | Facility: HOME HEALTHCARE | Age: 87
End: 2022-10-11
Payer: MEDICARE

## 2022-10-11 VITALS
TEMPERATURE: 97.9 F | HEART RATE: 72 BPM | RESPIRATION RATE: 24 BRPM | DIASTOLIC BLOOD PRESSURE: 68 MMHG | SYSTOLIC BLOOD PRESSURE: 104 MMHG

## 2022-10-11 PROBLEM — A41.9 SEVERE SEPSIS (HCC): Status: RESOLVED | Noted: 2022-04-19 | Resolved: 2022-10-11

## 2022-10-11 PROBLEM — J18.9 RIGHT UPPER LOBE PNEUMONIA: Status: RESOLVED | Noted: 2022-04-19 | Resolved: 2022-10-11

## 2022-10-11 PROBLEM — N30.00 ACUTE CYSTITIS WITHOUT HEMATURIA: Status: RESOLVED | Noted: 2022-04-19 | Resolved: 2022-10-11

## 2022-10-11 PROBLEM — R65.20 SEVERE SEPSIS (HCC): Status: RESOLVED | Noted: 2022-04-19 | Resolved: 2022-10-11

## 2022-10-11 PROCEDURE — G0299 HHS/HOSPICE OF RN EA 15 MIN: HCPCS

## 2022-10-11 PROCEDURE — G0155 HHCP-SVS OF CSW,EA 15 MIN: HCPCS

## 2022-10-12 ENCOUNTER — HOME CARE VISIT (OUTPATIENT)
Dept: HOME HEALTH SERVICES | Facility: HOME HEALTHCARE | Age: 87
End: 2022-10-12
Payer: MEDICARE

## 2022-10-12 PROCEDURE — G0156 HHCP-SVS OF AIDE,EA 15 MIN: HCPCS

## 2022-10-13 ENCOUNTER — HOME CARE VISIT (OUTPATIENT)
Dept: HOME HEALTH SERVICES | Facility: HOME HEALTHCARE | Age: 87
End: 2022-10-13
Payer: MEDICARE

## 2022-10-13 PROCEDURE — G0156 HHCP-SVS OF AIDE,EA 15 MIN: HCPCS

## 2022-10-14 ENCOUNTER — HOME CARE VISIT (OUTPATIENT)
Dept: HOME HEALTH SERVICES | Facility: HOME HEALTHCARE | Age: 87
End: 2022-10-14
Payer: MEDICARE

## 2022-10-14 VITALS
DIASTOLIC BLOOD PRESSURE: 50 MMHG | TEMPERATURE: 97.9 F | HEART RATE: 76 BPM | RESPIRATION RATE: 24 BRPM | SYSTOLIC BLOOD PRESSURE: 116 MMHG

## 2022-10-14 PROCEDURE — G0299 HHS/HOSPICE OF RN EA 15 MIN: HCPCS

## 2022-10-14 PROCEDURE — G0156 HHCP-SVS OF AIDE,EA 15 MIN: HCPCS

## 2022-10-18 ENCOUNTER — HOME CARE VISIT (OUTPATIENT)
Dept: HOME HOSPICE | Facility: HOSPICE | Age: 87
End: 2022-10-18
Payer: MEDICARE

## 2022-10-18 ENCOUNTER — HOME CARE VISIT (OUTPATIENT)
Dept: HOME HEALTH SERVICES | Facility: HOME HEALTHCARE | Age: 87
End: 2022-10-18
Payer: MEDICARE

## 2022-10-18 VITALS
WEIGHT: 134 LBS | SYSTOLIC BLOOD PRESSURE: 140 MMHG | BODY MASS INDEX: 23 KG/M2 | TEMPERATURE: 97.8 F | RESPIRATION RATE: 24 BRPM | HEART RATE: 88 BPM | DIASTOLIC BLOOD PRESSURE: 78 MMHG

## 2022-10-18 PROCEDURE — G0299 HHS/HOSPICE OF RN EA 15 MIN: HCPCS

## 2022-10-18 PROCEDURE — G0155 HHCP-SVS OF CSW,EA 15 MIN: HCPCS

## 2022-10-19 ENCOUNTER — HOME CARE VISIT (OUTPATIENT)
Dept: HOME HEALTH SERVICES | Facility: HOME HEALTHCARE | Age: 87
End: 2022-10-19
Payer: MEDICARE

## 2022-10-19 PROCEDURE — G0156 HHCP-SVS OF AIDE,EA 15 MIN: HCPCS

## 2022-10-20 ENCOUNTER — HOME CARE VISIT (OUTPATIENT)
Dept: HOME HOSPICE | Facility: HOSPICE | Age: 87
End: 2022-10-20
Payer: MEDICARE

## 2022-10-21 ENCOUNTER — HOME CARE VISIT (OUTPATIENT)
Dept: HOME HEALTH SERVICES | Facility: HOME HEALTHCARE | Age: 87
End: 2022-10-21
Payer: MEDICARE

## 2022-10-21 VITALS
RESPIRATION RATE: 20 BRPM | DIASTOLIC BLOOD PRESSURE: 68 MMHG | SYSTOLIC BLOOD PRESSURE: 132 MMHG | HEART RATE: 68 BPM | TEMPERATURE: 97.5 F

## 2022-10-21 PROCEDURE — G0299 HHS/HOSPICE OF RN EA 15 MIN: HCPCS

## 2022-10-21 PROCEDURE — G0156 HHCP-SVS OF AIDE,EA 15 MIN: HCPCS

## 2022-10-22 ENCOUNTER — HOME CARE VISIT (OUTPATIENT)
Dept: HOME HEALTH SERVICES | Facility: HOME HEALTHCARE | Age: 87
End: 2022-10-22
Payer: MEDICARE

## 2022-10-22 PROCEDURE — G0156 HHCP-SVS OF AIDE,EA 15 MIN: HCPCS

## 2022-10-24 ENCOUNTER — HOME CARE VISIT (OUTPATIENT)
Dept: HOME HEALTH SERVICES | Facility: HOME HEALTHCARE | Age: 87
End: 2022-10-24
Payer: MEDICARE

## 2022-10-24 PROCEDURE — G0156 HHCP-SVS OF AIDE,EA 15 MIN: HCPCS

## 2022-10-25 ENCOUNTER — HOME CARE VISIT (OUTPATIENT)
Dept: HOME HOSPICE | Facility: HOSPICE | Age: 87
End: 2022-10-25
Payer: MEDICARE

## 2022-10-25 ENCOUNTER — HOME CARE VISIT (OUTPATIENT)
Dept: HOME HEALTH SERVICES | Facility: HOME HEALTHCARE | Age: 87
End: 2022-10-25
Payer: MEDICARE

## 2022-10-25 VITALS
DIASTOLIC BLOOD PRESSURE: 66 MMHG | HEART RATE: 76 BPM | RESPIRATION RATE: 24 BRPM | TEMPERATURE: 97.3 F | SYSTOLIC BLOOD PRESSURE: 128 MMHG

## 2022-10-25 PROCEDURE — G0155 HHCP-SVS OF CSW,EA 15 MIN: HCPCS

## 2022-10-25 PROCEDURE — G0299 HHS/HOSPICE OF RN EA 15 MIN: HCPCS

## 2022-10-25 PROCEDURE — G0156 HHCP-SVS OF AIDE,EA 15 MIN: HCPCS

## 2022-10-26 ENCOUNTER — HOME CARE VISIT (OUTPATIENT)
Dept: HOME HEALTH SERVICES | Facility: HOME HEALTHCARE | Age: 87
End: 2022-10-26
Payer: MEDICARE

## 2022-10-26 PROCEDURE — G0156 HHCP-SVS OF AIDE,EA 15 MIN: HCPCS

## 2022-10-28 ENCOUNTER — HOME CARE VISIT (OUTPATIENT)
Dept: HOME HEALTH SERVICES | Facility: HOME HEALTHCARE | Age: 87
End: 2022-10-28
Payer: MEDICARE

## 2022-10-28 VITALS
TEMPERATURE: 97.6 F | RESPIRATION RATE: 24 BRPM | SYSTOLIC BLOOD PRESSURE: 130 MMHG | DIASTOLIC BLOOD PRESSURE: 80 MMHG | HEART RATE: 92 BPM

## 2022-10-28 PROCEDURE — G0299 HHS/HOSPICE OF RN EA 15 MIN: HCPCS

## 2022-10-28 PROCEDURE — G0156 HHCP-SVS OF AIDE,EA 15 MIN: HCPCS

## 2022-10-31 ENCOUNTER — HOME CARE VISIT (OUTPATIENT)
Dept: HOME HOSPICE | Facility: HOSPICE | Age: 87
End: 2022-10-31

## 2022-10-31 ENCOUNTER — HOME CARE VISIT (OUTPATIENT)
Dept: HOME HEALTH SERVICES | Facility: HOME HEALTHCARE | Age: 87
End: 2022-10-31

## 2022-11-01 ENCOUNTER — HOME CARE VISIT (OUTPATIENT)
Dept: HOME HEALTH SERVICES | Facility: HOME HEALTHCARE | Age: 87
End: 2022-11-01

## 2022-11-01 VITALS
DIASTOLIC BLOOD PRESSURE: 88 MMHG | TEMPERATURE: 97.4 F | RESPIRATION RATE: 20 BRPM | SYSTOLIC BLOOD PRESSURE: 150 MMHG | HEART RATE: 76 BPM

## 2022-11-02 ENCOUNTER — HOME CARE VISIT (OUTPATIENT)
Dept: HOME HOSPICE | Facility: HOSPICE | Age: 87
End: 2022-11-02

## 2022-11-03 ENCOUNTER — HOME CARE VISIT (OUTPATIENT)
Dept: HOME HOSPICE | Facility: HOSPICE | Age: 87
End: 2022-11-03

## 2022-11-03 ENCOUNTER — HOME CARE VISIT (OUTPATIENT)
Dept: HOME HEALTH SERVICES | Facility: HOME HEALTHCARE | Age: 87
End: 2022-11-03

## 2022-11-04 ENCOUNTER — HOME CARE VISIT (OUTPATIENT)
Dept: HOME HEALTH SERVICES | Facility: HOME HEALTHCARE | Age: 87
End: 2022-11-04

## 2022-11-04 VITALS
RESPIRATION RATE: 16 BRPM | TEMPERATURE: 97.9 F | DIASTOLIC BLOOD PRESSURE: 74 MMHG | HEART RATE: 72 BPM | SYSTOLIC BLOOD PRESSURE: 120 MMHG

## 2022-11-07 ENCOUNTER — HOME CARE VISIT (OUTPATIENT)
Dept: HOME HEALTH SERVICES | Facility: HOME HEALTHCARE | Age: 87
End: 2022-11-07

## 2022-11-08 ENCOUNTER — HOME CARE VISIT (OUTPATIENT)
Dept: HOME HEALTH SERVICES | Facility: HOME HEALTHCARE | Age: 87
End: 2022-11-08

## 2022-11-08 VITALS
RESPIRATION RATE: 24 BRPM | HEART RATE: 60 BPM | DIASTOLIC BLOOD PRESSURE: 64 MMHG | TEMPERATURE: 97.7 F | SYSTOLIC BLOOD PRESSURE: 138 MMHG

## 2022-11-09 ENCOUNTER — HOME CARE VISIT (OUTPATIENT)
Dept: HOME HEALTH SERVICES | Facility: HOME HEALTHCARE | Age: 87
End: 2022-11-09

## 2022-11-10 ENCOUNTER — HOME CARE VISIT (OUTPATIENT)
Dept: HOME HEALTH SERVICES | Facility: HOME HEALTHCARE | Age: 87
End: 2022-11-10

## 2022-11-11 ENCOUNTER — HOME CARE VISIT (OUTPATIENT)
Dept: HOME HEALTH SERVICES | Facility: HOME HEALTHCARE | Age: 87
End: 2022-11-11

## 2022-11-14 ENCOUNTER — HOME CARE VISIT (OUTPATIENT)
Dept: HOME HEALTH SERVICES | Facility: HOME HEALTHCARE | Age: 87
End: 2022-11-14

## 2022-11-15 ENCOUNTER — HOME CARE VISIT (OUTPATIENT)
Dept: HOME HEALTH SERVICES | Facility: HOME HEALTHCARE | Age: 87
End: 2022-11-15

## 2022-11-15 ENCOUNTER — HOME CARE VISIT (OUTPATIENT)
Dept: HOME HOSPICE | Facility: HOSPICE | Age: 87
End: 2022-11-15

## 2022-11-15 VITALS
RESPIRATION RATE: 20 BRPM | SYSTOLIC BLOOD PRESSURE: 148 MMHG | HEART RATE: 80 BPM | TEMPERATURE: 97.8 F | DIASTOLIC BLOOD PRESSURE: 78 MMHG

## 2022-11-17 ENCOUNTER — HOME CARE VISIT (OUTPATIENT)
Dept: HOME HEALTH SERVICES | Facility: HOME HEALTHCARE | Age: 87
End: 2022-11-17

## 2022-11-18 ENCOUNTER — HOME CARE VISIT (OUTPATIENT)
Dept: HOME HEALTH SERVICES | Facility: HOME HEALTHCARE | Age: 87
End: 2022-11-18

## 2022-11-18 ENCOUNTER — HOME CARE VISIT (OUTPATIENT)
Dept: HOME HOSPICE | Facility: HOSPICE | Age: 87
End: 2022-11-18

## 2022-11-18 VITALS
SYSTOLIC BLOOD PRESSURE: 128 MMHG | RESPIRATION RATE: 24 BRPM | HEART RATE: 76 BPM | TEMPERATURE: 97.3 F | DIASTOLIC BLOOD PRESSURE: 76 MMHG

## 2022-11-22 ENCOUNTER — HOME CARE VISIT (OUTPATIENT)
Dept: HOME HEALTH SERVICES | Facility: HOME HEALTHCARE | Age: 87
End: 2022-11-22

## 2022-11-22 VITALS
TEMPERATURE: 97.7 F | DIASTOLIC BLOOD PRESSURE: 66 MMHG | HEART RATE: 84 BPM | RESPIRATION RATE: 24 BRPM | SYSTOLIC BLOOD PRESSURE: 124 MMHG

## 2022-11-23 ENCOUNTER — HOME CARE VISIT (OUTPATIENT)
Dept: HOME HEALTH SERVICES | Facility: HOME HEALTHCARE | Age: 87
End: 2022-11-23

## 2022-11-25 ENCOUNTER — HOME CARE VISIT (OUTPATIENT)
Dept: HOME HEALTH SERVICES | Facility: HOME HEALTHCARE | Age: 87
End: 2022-11-25

## 2022-11-25 VITALS
TEMPERATURE: 98.3 F | DIASTOLIC BLOOD PRESSURE: 68 MMHG | RESPIRATION RATE: 20 BRPM | HEART RATE: 88 BPM | SYSTOLIC BLOOD PRESSURE: 120 MMHG

## 2022-11-28 ENCOUNTER — HOME CARE VISIT (OUTPATIENT)
Dept: HOME HOSPICE | Facility: HOSPICE | Age: 87
End: 2022-11-28

## 2022-11-29 ENCOUNTER — HOME CARE VISIT (OUTPATIENT)
Dept: HOME HEALTH SERVICES | Facility: HOME HEALTHCARE | Age: 87
End: 2022-11-29

## 2022-11-29 VITALS
DIASTOLIC BLOOD PRESSURE: 64 MMHG | TEMPERATURE: 97.5 F | SYSTOLIC BLOOD PRESSURE: 144 MMHG | RESPIRATION RATE: 20 BRPM | HEART RATE: 72 BPM

## 2022-11-30 ENCOUNTER — HOME CARE VISIT (OUTPATIENT)
Dept: HOME HEALTH SERVICES | Facility: HOME HEALTHCARE | Age: 87
End: 2022-11-30

## 2022-11-30 VITALS — RESPIRATION RATE: 20 BRPM

## 2022-12-01 ENCOUNTER — HOME CARE VISIT (OUTPATIENT)
Dept: HOME HOSPICE | Facility: HOSPICE | Age: 87
End: 2022-12-01

## 2022-12-02 ENCOUNTER — HOME CARE VISIT (OUTPATIENT)
Dept: HOME HOSPICE | Facility: HOSPICE | Age: 87
End: 2022-12-02

## 2022-12-02 ENCOUNTER — HOME CARE VISIT (OUTPATIENT)
Dept: HOME HEALTH SERVICES | Facility: HOME HEALTHCARE | Age: 87
End: 2022-12-02

## 2022-12-02 VITALS
RESPIRATION RATE: 16 BRPM | HEART RATE: 80 BPM | TEMPERATURE: 97.5 F | SYSTOLIC BLOOD PRESSURE: 120 MMHG | DIASTOLIC BLOOD PRESSURE: 76 MMHG

## 2022-12-04 ENCOUNTER — HOME CARE VISIT (OUTPATIENT)
Dept: HOME HEALTH SERVICES | Facility: HOME HEALTHCARE | Age: 87
End: 2022-12-04

## 2022-12-06 ENCOUNTER — HOME CARE VISIT (OUTPATIENT)
Dept: HOME HEALTH SERVICES | Facility: HOME HEALTHCARE | Age: 87
End: 2022-12-06

## 2022-12-06 VITALS
SYSTOLIC BLOOD PRESSURE: 110 MMHG | DIASTOLIC BLOOD PRESSURE: 76 MMHG | TEMPERATURE: 97.5 F | HEART RATE: 84 BPM | RESPIRATION RATE: 20 BRPM

## 2022-12-07 ENCOUNTER — HOME CARE VISIT (OUTPATIENT)
Dept: HOME HEALTH SERVICES | Facility: HOME HEALTHCARE | Age: 87
End: 2022-12-07

## 2022-12-08 ENCOUNTER — HOME CARE VISIT (OUTPATIENT)
Dept: HOME HEALTH SERVICES | Facility: HOME HEALTHCARE | Age: 87
End: 2022-12-08

## 2022-12-09 ENCOUNTER — HOME CARE VISIT (OUTPATIENT)
Dept: HOME HEALTH SERVICES | Facility: HOME HEALTHCARE | Age: 87
End: 2022-12-09

## 2022-12-09 VITALS
RESPIRATION RATE: 12 BRPM | TEMPERATURE: 97.3 F | DIASTOLIC BLOOD PRESSURE: 74 MMHG | HEART RATE: 84 BPM | SYSTOLIC BLOOD PRESSURE: 132 MMHG

## 2022-12-12 ENCOUNTER — HOME CARE VISIT (OUTPATIENT)
Dept: HOME HEALTH SERVICES | Facility: HOME HEALTHCARE | Age: 87
End: 2022-12-12

## 2022-12-13 ENCOUNTER — HOME CARE VISIT (OUTPATIENT)
Dept: HOME HEALTH SERVICES | Facility: HOME HEALTHCARE | Age: 87
End: 2022-12-13

## 2022-12-13 VITALS
HEART RATE: 84 BPM | DIASTOLIC BLOOD PRESSURE: 68 MMHG | SYSTOLIC BLOOD PRESSURE: 124 MMHG | RESPIRATION RATE: 16 BRPM | TEMPERATURE: 97.5 F

## 2022-12-14 ENCOUNTER — HOME CARE VISIT (OUTPATIENT)
Dept: HOME HOSPICE | Facility: HOSPICE | Age: 87
End: 2022-12-14

## 2022-12-14 ENCOUNTER — HOME CARE VISIT (OUTPATIENT)
Dept: HOME HEALTH SERVICES | Facility: HOME HEALTHCARE | Age: 87
End: 2022-12-14

## 2022-12-15 ENCOUNTER — HOME CARE VISIT (OUTPATIENT)
Dept: HOME HEALTH SERVICES | Facility: HOME HEALTHCARE | Age: 87
End: 2022-12-15

## 2022-12-16 ENCOUNTER — HOME CARE VISIT (OUTPATIENT)
Dept: HOME HEALTH SERVICES | Facility: HOME HEALTHCARE | Age: 87
End: 2022-12-16

## 2022-12-16 VITALS
TEMPERATURE: 98.2 F | RESPIRATION RATE: 20 BRPM | SYSTOLIC BLOOD PRESSURE: 134 MMHG | DIASTOLIC BLOOD PRESSURE: 76 MMHG | HEART RATE: 92 BPM

## 2022-12-19 ENCOUNTER — HOME CARE VISIT (OUTPATIENT)
Dept: HOME HEALTH SERVICES | Facility: HOME HEALTHCARE | Age: 87
End: 2022-12-19

## 2022-12-20 ENCOUNTER — HOME CARE VISIT (OUTPATIENT)
Dept: HOME HEALTH SERVICES | Facility: HOME HEALTHCARE | Age: 87
End: 2022-12-20

## 2022-12-20 VITALS
RESPIRATION RATE: 20 BRPM | SYSTOLIC BLOOD PRESSURE: 128 MMHG | DIASTOLIC BLOOD PRESSURE: 78 MMHG | TEMPERATURE: 98 F | HEART RATE: 88 BPM

## 2022-12-21 ENCOUNTER — HOME CARE VISIT (OUTPATIENT)
Dept: HOME HEALTH SERVICES | Facility: HOME HEALTHCARE | Age: 87
End: 2022-12-21

## 2022-12-22 ENCOUNTER — HOME CARE VISIT (OUTPATIENT)
Dept: HOME HEALTH SERVICES | Facility: HOME HEALTHCARE | Age: 87
End: 2022-12-22

## 2022-12-27 ENCOUNTER — HOME CARE VISIT (OUTPATIENT)
Dept: HOME HEALTH SERVICES | Facility: HOME HEALTHCARE | Age: 87
End: 2022-12-27

## 2022-12-27 VITALS
DIASTOLIC BLOOD PRESSURE: 64 MMHG | RESPIRATION RATE: 20 BRPM | SYSTOLIC BLOOD PRESSURE: 128 MMHG | HEART RATE: 68 BPM | TEMPERATURE: 97.9 F

## 2022-12-28 ENCOUNTER — HOME CARE VISIT (OUTPATIENT)
Dept: HOME HEALTH SERVICES | Facility: HOME HEALTHCARE | Age: 87
End: 2022-12-28

## 2022-12-29 ENCOUNTER — HOME CARE VISIT (OUTPATIENT)
Dept: HOME HEALTH SERVICES | Facility: HOME HEALTHCARE | Age: 87
End: 2022-12-29

## 2023-01-01 ENCOUNTER — HOME CARE VISIT (OUTPATIENT)
Dept: HOME HEALTH SERVICES | Facility: HOME HEALTHCARE | Age: 88
End: 2023-01-01

## 2023-01-01 ENCOUNTER — HOME CARE VISIT (OUTPATIENT)
Dept: HOME HOSPICE | Facility: HOSPICE | Age: 88
End: 2023-01-01

## 2023-01-01 VITALS
SYSTOLIC BLOOD PRESSURE: 100 MMHG | HEART RATE: 100 BPM | TEMPERATURE: 98.3 F | RESPIRATION RATE: 20 BRPM | DIASTOLIC BLOOD PRESSURE: 58 MMHG

## 2023-01-01 VITALS — HEART RATE: 100 BPM | TEMPERATURE: 97.5 F | RESPIRATION RATE: 20 BRPM

## 2023-01-01 DIAGNOSIS — Z51.5 HOSPICE CARE PATIENT: ICD-10-CM

## 2023-01-01 DIAGNOSIS — Z51.5 HOSPICE CARE PATIENT: Primary | ICD-10-CM

## 2023-01-01 RX ORDER — MORPHINE SULFATE 100 MG/5ML
SOLUTION ORAL
Qty: 30 ML | Refills: 0 | Status: SHIPPED | OUTPATIENT
Start: 2023-01-01

## 2023-01-01 RX ORDER — MORPHINE SULFATE 100 MG/5ML
SOLUTION ORAL
Qty: 30 ML | Refills: 0 | Status: SHIPPED | OUTPATIENT
Start: 2023-01-01 | End: 2023-01-01

## 2023-01-01 RX ORDER — LORAZEPAM 0.5 MG/1
TABLET ORAL
Qty: 40 TABLET | Refills: 0 | Status: SHIPPED | OUTPATIENT
Start: 2023-01-01

## 2023-01-01 RX ORDER — LORAZEPAM 0.5 MG/1
TABLET ORAL
Qty: 40 TABLET | Refills: 0 | Status: SHIPPED | OUTPATIENT
Start: 2023-01-01 | End: 2023-01-01

## 2023-01-03 ENCOUNTER — HOME CARE VISIT (OUTPATIENT)
Dept: HOME HEALTH SERVICES | Facility: HOME HEALTHCARE | Age: 88
End: 2023-01-03

## 2023-01-03 VITALS
RESPIRATION RATE: 20 BRPM | SYSTOLIC BLOOD PRESSURE: 124 MMHG | TEMPERATURE: 98 F | DIASTOLIC BLOOD PRESSURE: 86 MMHG | HEART RATE: 84 BPM

## 2023-01-05 ENCOUNTER — HOME CARE VISIT (OUTPATIENT)
Dept: HOME HOSPICE | Facility: HOSPICE | Age: 88
End: 2023-01-05

## 2023-01-06 ENCOUNTER — HOME CARE VISIT (OUTPATIENT)
Dept: HOME HOSPICE | Facility: HOSPICE | Age: 88
End: 2023-01-06

## 2023-01-06 ENCOUNTER — HOME CARE VISIT (OUTPATIENT)
Dept: HOME HEALTH SERVICES | Facility: HOME HEALTHCARE | Age: 88
End: 2023-01-06

## 2023-01-08 VITALS
DIASTOLIC BLOOD PRESSURE: 76 MMHG | HEART RATE: 84 BPM | RESPIRATION RATE: 24 BRPM | TEMPERATURE: 97.9 F | SYSTOLIC BLOOD PRESSURE: 106 MMHG

## 2023-01-10 ENCOUNTER — HOME CARE VISIT (OUTPATIENT)
Dept: HOME HEALTH SERVICES | Facility: HOME HEALTHCARE | Age: 88
End: 2023-01-10

## 2023-01-10 VITALS
SYSTOLIC BLOOD PRESSURE: 130 MMHG | DIASTOLIC BLOOD PRESSURE: 84 MMHG | RESPIRATION RATE: 24 BRPM | HEART RATE: 88 BPM | TEMPERATURE: 98.3 F

## 2023-01-10 DIAGNOSIS — Z51.5 HOSPICE CARE: Primary | ICD-10-CM

## 2023-01-10 RX ORDER — NYSTATIN 100000 U/G
CREAM TOPICAL
Qty: 30 G | Refills: 4 | Status: SHIPPED | OUTPATIENT
Start: 2023-01-10

## 2023-01-11 ENCOUNTER — HOME CARE VISIT (OUTPATIENT)
Dept: HOME HOSPICE | Facility: HOSPICE | Age: 88
End: 2023-01-11

## 2023-01-13 ENCOUNTER — HOME CARE VISIT (OUTPATIENT)
Dept: HOME HEALTH SERVICES | Facility: HOME HEALTHCARE | Age: 88
End: 2023-01-13

## 2023-01-13 VITALS
TEMPERATURE: 97.6 F | SYSTOLIC BLOOD PRESSURE: 104 MMHG | DIASTOLIC BLOOD PRESSURE: 74 MMHG | HEART RATE: 84 BPM | RESPIRATION RATE: 20 BRPM

## 2023-01-16 ENCOUNTER — HOME CARE VISIT (OUTPATIENT)
Dept: HOME HEALTH SERVICES | Facility: HOME HEALTHCARE | Age: 88
End: 2023-01-16

## 2023-01-17 ENCOUNTER — HOME CARE VISIT (OUTPATIENT)
Dept: HOME HEALTH SERVICES | Facility: HOME HEALTHCARE | Age: 88
End: 2023-01-17

## 2023-01-17 VITALS
RESPIRATION RATE: 20 BRPM | SYSTOLIC BLOOD PRESSURE: 136 MMHG | DIASTOLIC BLOOD PRESSURE: 88 MMHG | TEMPERATURE: 97.5 F | HEART RATE: 88 BPM

## 2023-01-18 ENCOUNTER — HOME CARE VISIT (OUTPATIENT)
Dept: HOME HEALTH SERVICES | Facility: HOME HEALTHCARE | Age: 88
End: 2023-01-18

## 2023-01-19 ENCOUNTER — HOME CARE VISIT (OUTPATIENT)
Dept: HOME HOSPICE | Facility: HOSPICE | Age: 88
End: 2023-01-19

## 2023-01-19 ENCOUNTER — HOME CARE VISIT (OUTPATIENT)
Dept: HOME HEALTH SERVICES | Facility: HOME HEALTHCARE | Age: 88
End: 2023-01-19

## 2023-01-20 ENCOUNTER — HOME CARE VISIT (OUTPATIENT)
Dept: HOME HEALTH SERVICES | Facility: HOME HEALTHCARE | Age: 88
End: 2023-01-20

## 2023-01-20 VITALS
DIASTOLIC BLOOD PRESSURE: 86 MMHG | RESPIRATION RATE: 28 BRPM | HEART RATE: 96 BPM | TEMPERATURE: 97.5 F | SYSTOLIC BLOOD PRESSURE: 124 MMHG

## 2023-01-23 ENCOUNTER — HOME CARE VISIT (OUTPATIENT)
Dept: HOME HOSPICE | Facility: HOSPICE | Age: 88
End: 2023-01-23

## 2023-01-24 ENCOUNTER — HOME CARE VISIT (OUTPATIENT)
Dept: HOME HEALTH SERVICES | Facility: HOME HEALTHCARE | Age: 88
End: 2023-01-24

## 2023-01-24 VITALS
RESPIRATION RATE: 20 BRPM | HEART RATE: 84 BPM | DIASTOLIC BLOOD PRESSURE: 80 MMHG | SYSTOLIC BLOOD PRESSURE: 124 MMHG | TEMPERATURE: 97.8 F

## 2023-01-25 ENCOUNTER — HOME CARE VISIT (OUTPATIENT)
Dept: HOME HEALTH SERVICES | Facility: HOME HEALTHCARE | Age: 88
End: 2023-01-25

## 2023-01-26 ENCOUNTER — HOME CARE VISIT (OUTPATIENT)
Dept: HOME HEALTH SERVICES | Facility: HOME HEALTHCARE | Age: 88
End: 2023-01-26

## 2023-01-27 ENCOUNTER — HOME CARE VISIT (OUTPATIENT)
Dept: HOME HEALTH SERVICES | Facility: HOME HEALTHCARE | Age: 88
End: 2023-01-27

## 2023-01-27 VITALS
DIASTOLIC BLOOD PRESSURE: 64 MMHG | RESPIRATION RATE: 24 BRPM | TEMPERATURE: 97.8 F | SYSTOLIC BLOOD PRESSURE: 112 MMHG | HEART RATE: 96 BPM

## 2023-01-31 ENCOUNTER — HOME CARE VISIT (OUTPATIENT)
Dept: HOME HEALTH SERVICES | Facility: HOME HEALTHCARE | Age: 88
End: 2023-01-31

## 2023-01-31 ENCOUNTER — HOME CARE VISIT (OUTPATIENT)
Dept: HOME HOSPICE | Facility: HOSPICE | Age: 88
End: 2023-01-31

## 2023-01-31 VITALS
HEART RATE: 72 BPM | RESPIRATION RATE: 20 BRPM | SYSTOLIC BLOOD PRESSURE: 110 MMHG | DIASTOLIC BLOOD PRESSURE: 64 MMHG | TEMPERATURE: 97.4 F

## 2023-02-01 ENCOUNTER — HOME CARE VISIT (OUTPATIENT)
Dept: HOME HEALTH SERVICES | Facility: HOME HEALTHCARE | Age: 88
End: 2023-02-01

## 2023-02-02 ENCOUNTER — HOME CARE VISIT (OUTPATIENT)
Dept: HOME HEALTH SERVICES | Facility: HOME HEALTHCARE | Age: 88
End: 2023-02-02

## 2023-02-02 ENCOUNTER — HOME CARE VISIT (OUTPATIENT)
Dept: HOME HOSPICE | Facility: HOSPICE | Age: 88
End: 2023-02-02

## 2023-02-03 ENCOUNTER — HOME CARE VISIT (OUTPATIENT)
Dept: HOME HEALTH SERVICES | Facility: HOME HEALTHCARE | Age: 88
End: 2023-02-03

## 2023-02-03 VITALS
TEMPERATURE: 97.5 F | RESPIRATION RATE: 20 BRPM | HEART RATE: 88 BPM | SYSTOLIC BLOOD PRESSURE: 126 MMHG | DIASTOLIC BLOOD PRESSURE: 74 MMHG

## 2023-02-06 ENCOUNTER — HOME CARE VISIT (OUTPATIENT)
Dept: HOME HEALTH SERVICES | Facility: HOME HEALTHCARE | Age: 88
End: 2023-02-06

## 2023-02-07 ENCOUNTER — HOME CARE VISIT (OUTPATIENT)
Dept: HOME HOSPICE | Facility: HOSPICE | Age: 88
End: 2023-02-07

## 2023-02-07 ENCOUNTER — HOME CARE VISIT (OUTPATIENT)
Dept: HOME HEALTH SERVICES | Facility: HOME HEALTHCARE | Age: 88
End: 2023-02-07

## 2023-02-07 VITALS
DIASTOLIC BLOOD PRESSURE: 88 MMHG | RESPIRATION RATE: 24 BRPM | SYSTOLIC BLOOD PRESSURE: 118 MMHG | TEMPERATURE: 97.6 F | HEART RATE: 96 BPM

## 2023-02-08 ENCOUNTER — HOME CARE VISIT (OUTPATIENT)
Dept: HOME HEALTH SERVICES | Facility: HOME HEALTHCARE | Age: 88
End: 2023-02-08

## 2023-02-08 VITALS — TEMPERATURE: 98.2 F | DIASTOLIC BLOOD PRESSURE: 88 MMHG | SYSTOLIC BLOOD PRESSURE: 138 MMHG

## 2023-02-10 ENCOUNTER — HOME CARE VISIT (OUTPATIENT)
Dept: HOME HEALTH SERVICES | Facility: HOME HEALTHCARE | Age: 88
End: 2023-02-10

## 2023-02-10 VITALS
DIASTOLIC BLOOD PRESSURE: 96 MMHG | HEART RATE: 100 BPM | RESPIRATION RATE: 24 BRPM | SYSTOLIC BLOOD PRESSURE: 138 MMHG | TEMPERATURE: 98.1 F

## 2023-02-11 ENCOUNTER — HOME CARE VISIT (OUTPATIENT)
Dept: HOME HEALTH SERVICES | Facility: HOME HEALTHCARE | Age: 88
End: 2023-02-11

## 2023-02-12 ENCOUNTER — HOME CARE VISIT (OUTPATIENT)
Dept: HOME HEALTH SERVICES | Facility: HOME HEALTHCARE | Age: 88
End: 2023-02-12

## 2023-02-14 ENCOUNTER — HOME CARE VISIT (OUTPATIENT)
Dept: HOME HEALTH SERVICES | Facility: HOME HEALTHCARE | Age: 88
End: 2023-02-14

## 2023-02-14 VITALS
TEMPERATURE: 97.8 F | SYSTOLIC BLOOD PRESSURE: 108 MMHG | DIASTOLIC BLOOD PRESSURE: 70 MMHG | RESPIRATION RATE: 16 BRPM | HEART RATE: 100 BPM

## 2023-02-15 ENCOUNTER — HOME CARE VISIT (OUTPATIENT)
Dept: HOME HEALTH SERVICES | Facility: HOME HEALTHCARE | Age: 88
End: 2023-02-15

## 2023-02-16 ENCOUNTER — HOME CARE VISIT (OUTPATIENT)
Dept: HOME HEALTH SERVICES | Facility: HOME HEALTHCARE | Age: 88
End: 2023-02-16

## 2023-02-17 ENCOUNTER — HOME CARE VISIT (OUTPATIENT)
Dept: HOME HEALTH SERVICES | Facility: HOME HEALTHCARE | Age: 88
End: 2023-02-17

## 2023-02-17 VITALS
DIASTOLIC BLOOD PRESSURE: 84 MMHG | HEART RATE: 100 BPM | SYSTOLIC BLOOD PRESSURE: 132 MMHG | TEMPERATURE: 97.7 F | RESPIRATION RATE: 20 BRPM

## 2023-02-20 ENCOUNTER — HOME CARE VISIT (OUTPATIENT)
Dept: HOME HEALTH SERVICES | Facility: HOME HEALTHCARE | Age: 88
End: 2023-02-20

## 2023-02-20 ENCOUNTER — HOME CARE VISIT (OUTPATIENT)
Dept: HOME HOSPICE | Facility: HOSPICE | Age: 88
End: 2023-02-20

## 2023-02-21 ENCOUNTER — HOME CARE VISIT (OUTPATIENT)
Dept: HOME HEALTH SERVICES | Facility: HOME HEALTHCARE | Age: 88
End: 2023-02-21

## 2023-02-21 VITALS
RESPIRATION RATE: 20 BRPM | SYSTOLIC BLOOD PRESSURE: 110 MMHG | TEMPERATURE: 98.1 F | HEART RATE: 100 BPM | DIASTOLIC BLOOD PRESSURE: 72 MMHG

## 2023-02-22 ENCOUNTER — HOME CARE VISIT (OUTPATIENT)
Dept: HOME HOSPICE | Facility: HOSPICE | Age: 88
End: 2023-02-22

## 2023-02-22 ENCOUNTER — HOME CARE VISIT (OUTPATIENT)
Dept: HOME HEALTH SERVICES | Facility: HOME HEALTHCARE | Age: 88
End: 2023-02-22

## 2023-02-23 ENCOUNTER — HOME CARE VISIT (OUTPATIENT)
Dept: HOME HEALTH SERVICES | Facility: HOME HEALTHCARE | Age: 88
End: 2023-02-23

## 2023-02-23 VITALS — HEART RATE: 68 BPM | TEMPERATURE: 98.2 F | RESPIRATION RATE: 22 BRPM

## 2023-02-24 ENCOUNTER — HOME CARE VISIT (OUTPATIENT)
Dept: HOME HEALTH SERVICES | Facility: HOME HEALTHCARE | Age: 88
End: 2023-02-24

## 2023-02-26 ENCOUNTER — HOME CARE VISIT (OUTPATIENT)
Dept: HOME HEALTH SERVICES | Facility: HOME HEALTHCARE | Age: 88
End: 2023-02-26

## 2023-02-27 ENCOUNTER — HOME CARE VISIT (OUTPATIENT)
Dept: HOME HEALTH SERVICES | Facility: HOME HEALTHCARE | Age: 88
End: 2023-02-27

## 2023-02-27 ENCOUNTER — HOME CARE VISIT (OUTPATIENT)
Dept: HOME HOSPICE | Facility: HOSPICE | Age: 88
End: 2023-02-27

## 2023-02-28 ENCOUNTER — HOME CARE VISIT (OUTPATIENT)
Dept: HOME HEALTH SERVICES | Facility: HOME HEALTHCARE | Age: 88
End: 2023-02-28

## 2023-02-28 VITALS — RESPIRATION RATE: 18 BRPM

## 2023-02-28 VITALS
RESPIRATION RATE: 24 BRPM | SYSTOLIC BLOOD PRESSURE: 120 MMHG | HEART RATE: 104 BPM | DIASTOLIC BLOOD PRESSURE: 58 MMHG | TEMPERATURE: 98.1 F

## 2023-03-01 ENCOUNTER — HOME CARE VISIT (OUTPATIENT)
Dept: HOME HEALTH SERVICES | Facility: HOME HEALTHCARE | Age: 88
End: 2023-03-01

## 2023-03-02 ENCOUNTER — HOME CARE VISIT (OUTPATIENT)
Dept: HOME HEALTH SERVICES | Facility: HOME HEALTHCARE | Age: 88
End: 2023-03-02

## 2023-03-03 ENCOUNTER — HOME CARE VISIT (OUTPATIENT)
Dept: HOME HOSPICE | Facility: HOSPICE | Age: 88
End: 2023-03-03

## 2023-03-03 ENCOUNTER — HOME CARE VISIT (OUTPATIENT)
Dept: HOME HEALTH SERVICES | Facility: HOME HEALTHCARE | Age: 88
End: 2023-03-03

## 2023-03-03 VITALS
HEART RATE: 100 BPM | DIASTOLIC BLOOD PRESSURE: 58 MMHG | TEMPERATURE: 96.2 F | SYSTOLIC BLOOD PRESSURE: 108 MMHG | RESPIRATION RATE: 24 BRPM

## 2023-03-07 ENCOUNTER — HOME CARE VISIT (OUTPATIENT)
Dept: HOME HOSPICE | Facility: HOSPICE | Age: 88
End: 2023-03-07

## 2023-03-07 ENCOUNTER — HOME CARE VISIT (OUTPATIENT)
Dept: HOME HEALTH SERVICES | Facility: HOME HEALTHCARE | Age: 88
End: 2023-03-07

## 2023-03-07 VITALS
DIASTOLIC BLOOD PRESSURE: 68 MMHG | SYSTOLIC BLOOD PRESSURE: 108 MMHG | TEMPERATURE: 98.3 F | RESPIRATION RATE: 28 BRPM | HEART RATE: 104 BPM

## 2023-03-08 ENCOUNTER — HOME CARE VISIT (OUTPATIENT)
Dept: HOME HEALTH SERVICES | Facility: HOME HEALTHCARE | Age: 88
End: 2023-03-08

## 2023-03-09 ENCOUNTER — HOME CARE VISIT (OUTPATIENT)
Dept: HOME HEALTH SERVICES | Facility: HOME HEALTHCARE | Age: 88
End: 2023-03-09

## 2023-03-09 VITALS
RESPIRATION RATE: 20 BRPM | DIASTOLIC BLOOD PRESSURE: 80 MMHG | HEART RATE: 98 BPM | TEMPERATURE: 97.2 F | SYSTOLIC BLOOD PRESSURE: 136 MMHG

## 2023-03-09 DIAGNOSIS — Z51.5 HOSPICE CARE PATIENT: Primary | ICD-10-CM

## 2023-03-09 RX ORDER — SENNOSIDES 8.6 MG
8.6 TABLET ORAL 2 TIMES DAILY
Qty: 60 TABLET | Refills: 2 | Status: SHIPPED | OUTPATIENT
Start: 2023-03-09

## 2023-03-10 ENCOUNTER — HOME CARE VISIT (OUTPATIENT)
Dept: HOME HEALTH SERVICES | Facility: HOME HEALTHCARE | Age: 88
End: 2023-03-10

## 2023-03-10 VITALS
SYSTOLIC BLOOD PRESSURE: 124 MMHG | RESPIRATION RATE: 20 BRPM | DIASTOLIC BLOOD PRESSURE: 88 MMHG | HEART RATE: 100 BPM | TEMPERATURE: 98.2 F

## 2023-03-13 ENCOUNTER — HOME CARE VISIT (OUTPATIENT)
Dept: HOME HEALTH SERVICES | Facility: HOME HEALTHCARE | Age: 88
End: 2023-03-13

## 2023-03-14 ENCOUNTER — HOME CARE VISIT (OUTPATIENT)
Dept: HOME HOSPICE | Facility: HOSPICE | Age: 88
End: 2023-03-14

## 2023-03-14 ENCOUNTER — HOME CARE VISIT (OUTPATIENT)
Dept: HOME HEALTH SERVICES | Facility: HOME HEALTHCARE | Age: 88
End: 2023-03-14

## 2023-03-14 VITALS
HEART RATE: 92 BPM | DIASTOLIC BLOOD PRESSURE: 88 MMHG | RESPIRATION RATE: 20 BRPM | SYSTOLIC BLOOD PRESSURE: 116 MMHG | TEMPERATURE: 97.8 F

## 2023-03-15 ENCOUNTER — HOME CARE VISIT (OUTPATIENT)
Dept: HOME HOSPICE | Facility: HOSPICE | Age: 88
End: 2023-03-15

## 2023-03-15 ENCOUNTER — HOME CARE VISIT (OUTPATIENT)
Dept: HOME HEALTH SERVICES | Facility: HOME HEALTHCARE | Age: 88
End: 2023-03-15

## 2023-03-16 ENCOUNTER — HOME CARE VISIT (OUTPATIENT)
Dept: HOME HOSPICE | Facility: HOSPICE | Age: 88
End: 2023-03-16

## 2023-03-16 ENCOUNTER — HOME CARE VISIT (OUTPATIENT)
Dept: HOME HEALTH SERVICES | Facility: HOME HEALTHCARE | Age: 88
End: 2023-03-16

## 2023-03-17 ENCOUNTER — HOME CARE VISIT (OUTPATIENT)
Dept: HOME HEALTH SERVICES | Facility: HOME HEALTHCARE | Age: 88
End: 2023-03-17

## 2023-03-17 VITALS
SYSTOLIC BLOOD PRESSURE: 114 MMHG | TEMPERATURE: 97.3 F | DIASTOLIC BLOOD PRESSURE: 78 MMHG | RESPIRATION RATE: 20 BRPM | HEART RATE: 108 BPM

## 2023-03-29 ENCOUNTER — HOME CARE VISIT (OUTPATIENT)
Dept: HOME HOSPICE | Facility: HOSPICE | Age: 88
End: 2023-03-29